# Patient Record
Sex: MALE | Race: BLACK OR AFRICAN AMERICAN | Employment: OTHER | URBAN - NONMETROPOLITAN AREA
[De-identification: names, ages, dates, MRNs, and addresses within clinical notes are randomized per-mention and may not be internally consistent; named-entity substitution may affect disease eponyms.]

---

## 2017-01-04 ENCOUNTER — CLINICAL DOCUMENTATION (OUTPATIENT)
Dept: FAMILY MEDICINE CLINIC | Age: 82
End: 2017-01-04

## 2017-01-04 VITALS
BODY MASS INDEX: 52.09 KG/M2 | WEIGHT: 315 LBS | TEMPERATURE: 98.1 F | SYSTOLIC BLOOD PRESSURE: 116 MMHG | HEART RATE: 78 BPM | RESPIRATION RATE: 18 BRPM | DIASTOLIC BLOOD PRESSURE: 67 MMHG

## 2017-01-04 DIAGNOSIS — R31.0 GROSS HEMATURIA: ICD-10-CM

## 2017-01-04 DIAGNOSIS — Z99.2 TYPE 2 DIABETES MELLITUS WITH CHRONIC KIDNEY DISEASE ON CHRONIC DIALYSIS, WITH LONG-TERM CURRENT USE OF INSULIN (HCC): ICD-10-CM

## 2017-01-04 DIAGNOSIS — Z79.4 TYPE 2 DIABETES MELLITUS WITH CHRONIC KIDNEY DISEASE ON CHRONIC DIALYSIS, WITH LONG-TERM CURRENT USE OF INSULIN (HCC): ICD-10-CM

## 2017-01-04 DIAGNOSIS — I50.42 CHRONIC COMBINED SYSTOLIC AND DIASTOLIC CONGESTIVE HEART FAILURE (HCC): ICD-10-CM

## 2017-01-04 DIAGNOSIS — Z95.3 S/P AORTIC VALVE REPLACEMENT WITH TISSUE: ICD-10-CM

## 2017-01-04 DIAGNOSIS — I25.10 CORONARY ARTERY DISEASE INVOLVING NATIVE CORONARY ARTERY OF NATIVE HEART WITHOUT ANGINA PECTORIS: ICD-10-CM

## 2017-01-04 DIAGNOSIS — I26.99 OTHER ACUTE PULMONARY EMBOLISM WITHOUT ACUTE COR PULMONALE (HCC): Primary | ICD-10-CM

## 2017-01-04 DIAGNOSIS — Z95.0 PACEMAKER: ICD-10-CM

## 2017-01-04 DIAGNOSIS — N40.1 BENIGN PROSTATIC HYPERPLASIA WITH LOWER URINARY TRACT SYMPTOMS, UNSPECIFIED MORPHOLOGY: ICD-10-CM

## 2017-01-04 DIAGNOSIS — E11.22 TYPE 2 DIABETES MELLITUS WITH CHRONIC KIDNEY DISEASE ON CHRONIC DIALYSIS, WITH LONG-TERM CURRENT USE OF INSULIN (HCC): ICD-10-CM

## 2017-01-04 DIAGNOSIS — R53.81 PHYSICAL DECONDITIONING: ICD-10-CM

## 2017-01-04 DIAGNOSIS — N18.6 TYPE 2 DIABETES MELLITUS WITH CHRONIC KIDNEY DISEASE ON CHRONIC DIALYSIS, WITH LONG-TERM CURRENT USE OF INSULIN (HCC): ICD-10-CM

## 2017-01-04 DIAGNOSIS — N28.89 RENAL MASS, LEFT: ICD-10-CM

## 2017-01-04 DIAGNOSIS — J42 CHRONIC BRONCHITIS, UNSPECIFIED CHRONIC BRONCHITIS TYPE (HCC): ICD-10-CM

## 2017-01-04 DIAGNOSIS — E78.00 PURE HYPERCHOLESTEROLEMIA: ICD-10-CM

## 2017-01-04 DIAGNOSIS — M17.12 PRIMARY OSTEOARTHRITIS OF LEFT KNEE: ICD-10-CM

## 2017-01-25 ENCOUNTER — PROCEDURE VISIT (OUTPATIENT)
Dept: CARDIOLOGY | Age: 82
End: 2017-01-25

## 2017-01-25 ENCOUNTER — OFFICE VISIT (OUTPATIENT)
Dept: CARDIOLOGY | Age: 82
End: 2017-01-25

## 2017-01-25 VITALS
DIASTOLIC BLOOD PRESSURE: 54 MMHG | WEIGHT: 315 LBS | HEART RATE: 80 BPM | SYSTOLIC BLOOD PRESSURE: 72 MMHG | HEIGHT: 70 IN | BODY MASS INDEX: 45.1 KG/M2

## 2017-01-25 DIAGNOSIS — I48.92 ATRIAL FLUTTER, UNSPECIFIED TYPE (HCC): ICD-10-CM

## 2017-01-25 DIAGNOSIS — Z95.0 PACEMAKER: Primary | ICD-10-CM

## 2017-01-25 DIAGNOSIS — E78.5 HYPERLIPIDEMIA, UNSPECIFIED HYPERLIPIDEMIA TYPE: ICD-10-CM

## 2017-01-25 DIAGNOSIS — I48.0 PAROXYSMAL ATRIAL FIBRILLATION (HCC): ICD-10-CM

## 2017-01-25 DIAGNOSIS — I10 ESSENTIAL HYPERTENSION: ICD-10-CM

## 2017-01-25 PROBLEM — I48.91 ATRIAL FIBRILLATION (HCC): Status: ACTIVE | Noted: 2017-01-25

## 2017-01-25 PROCEDURE — 1036F TOBACCO NON-USER: CPT | Performed by: INTERNAL MEDICINE

## 2017-01-25 PROCEDURE — 4040F PNEUMOC VAC/ADMIN/RCVD: CPT | Performed by: INTERNAL MEDICINE

## 2017-01-25 PROCEDURE — G8419 CALC BMI OUT NRM PARAM NOF/U: HCPCS | Performed by: INTERNAL MEDICINE

## 2017-01-25 PROCEDURE — 1111F DSCHRG MED/CURRENT MED MERGE: CPT | Performed by: INTERNAL MEDICINE

## 2017-01-25 PROCEDURE — G8484 FLU IMMUNIZE NO ADMIN: HCPCS | Performed by: INTERNAL MEDICINE

## 2017-01-25 PROCEDURE — 93280 PM DEVICE PROGR EVAL DUAL: CPT | Performed by: INTERNAL MEDICINE

## 2017-01-25 PROCEDURE — G8598 ASA/ANTIPLAT THER USED: HCPCS | Performed by: INTERNAL MEDICINE

## 2017-01-25 PROCEDURE — G8427 DOCREV CUR MEDS BY ELIG CLIN: HCPCS | Performed by: INTERNAL MEDICINE

## 2017-01-25 PROCEDURE — 1123F ACP DISCUSS/DSCN MKR DOCD: CPT | Performed by: INTERNAL MEDICINE

## 2017-01-25 PROCEDURE — 99213 OFFICE O/P EST LOW 20 MIN: CPT | Performed by: INTERNAL MEDICINE

## 2017-01-25 ASSESSMENT — ENCOUNTER SYMPTOMS
GASTROINTESTINAL NEGATIVE: 1
RESPIRATORY NEGATIVE: 1
EYES NEGATIVE: 1

## 2017-02-10 LAB — INR BLD: 2.8

## 2017-02-17 LAB — INR BLD: 1.7

## 2017-02-20 LAB — INR BLD: 3

## 2017-02-24 LAB — INR BLD: 2

## 2017-02-28 LAB — INR BLD: 2.9

## 2017-03-07 LAB — INR BLD: 2.8

## 2017-03-10 LAB — INR BLD: 2.1

## 2017-03-13 ENCOUNTER — TELEPHONE (OUTPATIENT)
Dept: OTHER | Age: 82
End: 2017-03-13

## 2017-03-16 ENCOUNTER — ANTI-COAG VISIT (OUTPATIENT)
Dept: OTHER | Age: 82
End: 2017-03-16

## 2017-03-16 VITALS — DIASTOLIC BLOOD PRESSURE: 56 MMHG | TEMPERATURE: 97 F | HEART RATE: 79 BPM | SYSTOLIC BLOOD PRESSURE: 106 MMHG

## 2017-03-16 DIAGNOSIS — I26.99 OTHER ACUTE PULMONARY EMBOLISM WITHOUT ACUTE COR PULMONALE (HCC): ICD-10-CM

## 2017-03-16 DIAGNOSIS — Z95.2 STATUS POST AORTIC VALVE REPLACEMENT: ICD-10-CM

## 2017-03-16 DIAGNOSIS — I48.92 ATRIAL FLUTTER, UNSPECIFIED TYPE (HCC): ICD-10-CM

## 2017-03-16 DIAGNOSIS — I48.0 PAROXYSMAL ATRIAL FIBRILLATION (HCC): ICD-10-CM

## 2017-03-16 PROBLEM — Z79.01 ANTICOAGULATED ON COUMADIN: Status: ACTIVE | Noted: 2017-03-16

## 2017-03-16 LAB — POC INR: 1.2 (ref 0.8–1.2)

## 2017-03-16 RX ORDER — WARFARIN SODIUM 2.5 MG/1
TABLET ORAL
Status: ON HOLD | COMMUNITY
End: 2020-01-01

## 2017-03-16 RX ORDER — WARFARIN SODIUM 3 MG/1
TABLET ORAL
COMMUNITY
End: 2017-03-27 | Stop reason: SDUPTHER

## 2017-03-16 RX ORDER — WARFARIN SODIUM 1 MG/1
TABLET ORAL
COMMUNITY
End: 2017-03-27 | Stop reason: SDUPTHER

## 2017-03-22 ENCOUNTER — ANTI-COAG VISIT (OUTPATIENT)
Dept: OTHER | Age: 82
End: 2017-03-22

## 2017-03-22 VITALS
BODY MASS INDEX: 45.03 KG/M2 | HEART RATE: 80 BPM | SYSTOLIC BLOOD PRESSURE: 112 MMHG | DIASTOLIC BLOOD PRESSURE: 60 MMHG | WEIGHT: 313.8 LBS

## 2017-03-22 DIAGNOSIS — I26.99 OTHER ACUTE PULMONARY EMBOLISM WITHOUT ACUTE COR PULMONALE (HCC): ICD-10-CM

## 2017-03-22 DIAGNOSIS — I48.92 ATRIAL FLUTTER, UNSPECIFIED TYPE (HCC): ICD-10-CM

## 2017-03-22 DIAGNOSIS — I48.0 PAROXYSMAL ATRIAL FIBRILLATION (HCC): ICD-10-CM

## 2017-03-22 DIAGNOSIS — Z95.2 STATUS POST AORTIC VALVE REPLACEMENT: ICD-10-CM

## 2017-03-22 LAB — POC INR: 1.2 (ref 0.8–1.2)

## 2017-03-22 RX ORDER — INSULIN GLARGINE 100 [IU]/ML
20 INJECTION, SOLUTION SUBCUTANEOUS NIGHTLY
COMMUNITY
End: 2017-03-27

## 2017-03-22 RX ORDER — TAMSULOSIN HYDROCHLORIDE 0.4 MG/1
0.4 CAPSULE ORAL DAILY
COMMUNITY
End: 2017-06-20 | Stop reason: SDUPTHER

## 2017-03-22 ASSESSMENT — ENCOUNTER SYMPTOMS
CONSTIPATION: 0
BLOOD IN STOOL: 0
DIARRHEA: 0
SHORTNESS OF BREATH: 1

## 2017-03-27 ENCOUNTER — ANTI-COAG VISIT (OUTPATIENT)
Dept: OTHER | Age: 82
End: 2017-03-27

## 2017-03-27 VITALS
HEART RATE: 78 BPM | SYSTOLIC BLOOD PRESSURE: 99 MMHG | DIASTOLIC BLOOD PRESSURE: 64 MMHG | WEIGHT: 308 LBS | BODY MASS INDEX: 44.19 KG/M2

## 2017-03-27 DIAGNOSIS — I48.92 ATRIAL FLUTTER, UNSPECIFIED TYPE (HCC): ICD-10-CM

## 2017-03-27 DIAGNOSIS — I48.0 PAROXYSMAL ATRIAL FIBRILLATION (HCC): ICD-10-CM

## 2017-03-27 DIAGNOSIS — Z95.2 STATUS POST AORTIC VALVE REPLACEMENT: ICD-10-CM

## 2017-03-27 DIAGNOSIS — I26.99 OTHER PULMONARY EMBOLISM WITHOUT ACUTE COR PULMONALE, UNSPECIFIED CHRONICITY (HCC): ICD-10-CM

## 2017-03-27 LAB — POC INR: 1.9 (ref 0.8–1.2)

## 2017-03-27 RX ORDER — INSULIN GLARGINE 100 [IU]/ML
INJECTION, SOLUTION SUBCUTANEOUS
COMMUNITY
Start: 2017-03-15 | End: 2017-06-05

## 2017-03-27 RX ORDER — CEPHALEXIN 250 MG/1
250 CAPSULE ORAL 2 TIMES DAILY
COMMUNITY
Start: 2017-03-23 | End: 2017-04-03

## 2017-03-27 RX ORDER — WARFARIN SODIUM 3 MG/1
TABLET ORAL
Qty: 30 TABLET | Refills: 11 | Status: SHIPPED | OUTPATIENT
Start: 2017-03-27 | End: 2017-04-17 | Stop reason: SDUPTHER

## 2017-03-27 RX ORDER — WARFARIN SODIUM 1 MG/1
TABLET ORAL
Qty: 30 TABLET | Refills: 11 | Status: SHIPPED | OUTPATIENT
Start: 2017-03-27 | End: 2017-08-14

## 2017-03-27 RX ORDER — FAMOTIDINE 20 MG/1
20 TABLET, FILM COATED ORAL DAILY
COMMUNITY
Start: 2017-02-25 | End: 2017-04-24 | Stop reason: ALTCHOICE

## 2017-03-27 RX ORDER — INSULIN ASPART 100 [IU]/ML
INJECTION, SOLUTION INTRAVENOUS; SUBCUTANEOUS
COMMUNITY
Start: 2017-03-15 | End: 2017-06-20 | Stop reason: SDUPTHER

## 2017-03-27 ASSESSMENT — ENCOUNTER SYMPTOMS
BLOOD IN STOOL: 0
CONSTIPATION: 0
SHORTNESS OF BREATH: 1
DIARRHEA: 1
VOMITING: 1

## 2017-03-28 ENCOUNTER — OFFICE VISIT (OUTPATIENT)
Dept: FAMILY MEDICINE CLINIC | Age: 82
End: 2017-03-28

## 2017-03-28 VITALS
DIASTOLIC BLOOD PRESSURE: 64 MMHG | HEIGHT: 70 IN | HEART RATE: 80 BPM | SYSTOLIC BLOOD PRESSURE: 116 MMHG | RESPIRATION RATE: 16 BRPM

## 2017-03-28 DIAGNOSIS — Z99.81 ON HOME OXYGEN THERAPY: ICD-10-CM

## 2017-03-28 DIAGNOSIS — N18.6 ESRD NEEDING DIALYSIS (HCC): ICD-10-CM

## 2017-03-28 DIAGNOSIS — N18.4 TYPE 2 DIABETES MELLITUS WITH STAGE 4 CHRONIC KIDNEY DISEASE, WITH LONG-TERM CURRENT USE OF INSULIN (HCC): ICD-10-CM

## 2017-03-28 DIAGNOSIS — I10 ESSENTIAL HYPERTENSION: ICD-10-CM

## 2017-03-28 DIAGNOSIS — Z79.01 ANTICOAGULATED ON COUMADIN: ICD-10-CM

## 2017-03-28 DIAGNOSIS — I25.10 CORONARY ARTERY DISEASE INVOLVING NATIVE CORONARY ARTERY OF NATIVE HEART WITHOUT ANGINA PECTORIS: ICD-10-CM

## 2017-03-28 DIAGNOSIS — Z79.4 TYPE 2 DIABETES MELLITUS WITH STAGE 4 CHRONIC KIDNEY DISEASE, WITH LONG-TERM CURRENT USE OF INSULIN (HCC): ICD-10-CM

## 2017-03-28 DIAGNOSIS — Z99.2 ESRD NEEDING DIALYSIS (HCC): ICD-10-CM

## 2017-03-28 DIAGNOSIS — Z99.2 HEMODIALYSIS PATIENT (HCC): ICD-10-CM

## 2017-03-28 DIAGNOSIS — E66.01 MORBID OBESITY WITH BMI OF 50.0-59.9, ADULT (HCC): ICD-10-CM

## 2017-03-28 DIAGNOSIS — E78.5 HYPERLIPIDEMIA, UNSPECIFIED HYPERLIPIDEMIA TYPE: ICD-10-CM

## 2017-03-28 DIAGNOSIS — I51.9 HEART DISEASE: ICD-10-CM

## 2017-03-28 DIAGNOSIS — I50.43 ACUTE ON CHRONIC COMBINED SYSTOLIC AND DIASTOLIC CONGESTIVE HEART FAILURE (HCC): Primary | ICD-10-CM

## 2017-03-28 DIAGNOSIS — E11.22 TYPE 2 DIABETES MELLITUS WITH STAGE 4 CHRONIC KIDNEY DISEASE, WITH LONG-TERM CURRENT USE OF INSULIN (HCC): ICD-10-CM

## 2017-03-28 LAB
GLUCOSE BLD-MCNC: 130 MG/DL
HBA1C MFR BLD: 6.7 %

## 2017-03-28 PROCEDURE — 82962 GLUCOSE BLOOD TEST: CPT | Performed by: EMERGENCY MEDICINE

## 2017-03-28 PROCEDURE — 83036 HEMOGLOBIN GLYCOSYLATED A1C: CPT | Performed by: EMERGENCY MEDICINE

## 2017-03-28 PROCEDURE — 99214 OFFICE O/P EST MOD 30 MIN: CPT | Performed by: EMERGENCY MEDICINE

## 2017-03-28 RX ORDER — NYSTATIN 100000 [USP'U]/G
POWDER TOPICAL
Qty: 60 G | Refills: 1 | Status: SHIPPED | OUTPATIENT
Start: 2017-03-28 | End: 2017-04-24 | Stop reason: ALTCHOICE

## 2017-03-28 ASSESSMENT — ENCOUNTER SYMPTOMS
SINUS PRESSURE: 0
CONSTIPATION: 0
WHEEZING: 0
VOMITING: 0
CHEST TIGHTNESS: 0
DIARRHEA: 0
VOICE CHANGE: 0
COUGH: 0
SORE THROAT: 0
ABDOMINAL PAIN: 0
NAUSEA: 0
TROUBLE SWALLOWING: 0
BACK PAIN: 0
RHINORRHEA: 0

## 2017-03-29 ASSESSMENT — ENCOUNTER SYMPTOMS: SHORTNESS OF BREATH: 1

## 2017-04-03 ENCOUNTER — TELEPHONE (OUTPATIENT)
Dept: FAMILY MEDICINE CLINIC | Age: 82
End: 2017-04-03

## 2017-04-03 ENCOUNTER — ANTI-COAG VISIT (OUTPATIENT)
Dept: OTHER | Age: 82
End: 2017-04-03

## 2017-04-03 VITALS
HEART RATE: 79 BPM | WEIGHT: 315 LBS | SYSTOLIC BLOOD PRESSURE: 109 MMHG | BODY MASS INDEX: 47.78 KG/M2 | DIASTOLIC BLOOD PRESSURE: 50 MMHG

## 2017-04-03 DIAGNOSIS — I48.92 ATRIAL FLUTTER, UNSPECIFIED TYPE (HCC): ICD-10-CM

## 2017-04-03 DIAGNOSIS — I48.0 PAROXYSMAL ATRIAL FIBRILLATION (HCC): ICD-10-CM

## 2017-04-03 DIAGNOSIS — I26.99 OTHER PULMONARY EMBOLISM WITHOUT ACUTE COR PULMONALE, UNSPECIFIED CHRONICITY (HCC): ICD-10-CM

## 2017-04-03 LAB — POC INR: 1.8 (ref 0.8–1.2)

## 2017-04-03 ASSESSMENT — ENCOUNTER SYMPTOMS
BLOOD IN STOOL: 0
DIARRHEA: 0
SHORTNESS OF BREATH: 1
CONSTIPATION: 0

## 2017-04-05 ENCOUNTER — TELEPHONE (OUTPATIENT)
Dept: OTHER | Age: 82
End: 2017-04-05

## 2017-04-10 ENCOUNTER — ANTI-COAG VISIT (OUTPATIENT)
Dept: OTHER | Age: 82
End: 2017-04-10

## 2017-04-10 VITALS — SYSTOLIC BLOOD PRESSURE: 105 MMHG | DIASTOLIC BLOOD PRESSURE: 53 MMHG | TEMPERATURE: 97 F | HEART RATE: 78 BPM

## 2017-04-10 DIAGNOSIS — I48.0 PAROXYSMAL ATRIAL FIBRILLATION (HCC): ICD-10-CM

## 2017-04-10 DIAGNOSIS — I48.92 ATRIAL FLUTTER, UNSPECIFIED TYPE (HCC): ICD-10-CM

## 2017-04-10 DIAGNOSIS — I26.99 OTHER PULMONARY EMBOLISM WITHOUT ACUTE COR PULMONALE, UNSPECIFIED CHRONICITY (HCC): ICD-10-CM

## 2017-04-10 LAB — POC INR: 1.4 (ref 0.8–1.2)

## 2017-04-10 ASSESSMENT — ENCOUNTER SYMPTOMS
CONSTIPATION: 0
SHORTNESS OF BREATH: 1
DIARRHEA: 0
BLOOD IN STOOL: 0

## 2017-04-17 ENCOUNTER — ANTI-COAG VISIT (OUTPATIENT)
Dept: OTHER | Age: 82
End: 2017-04-17

## 2017-04-17 VITALS — TEMPERATURE: 97 F | DIASTOLIC BLOOD PRESSURE: 51 MMHG | HEART RATE: 79 BPM | SYSTOLIC BLOOD PRESSURE: 103 MMHG

## 2017-04-17 DIAGNOSIS — I26.99 OTHER PULMONARY EMBOLISM WITHOUT ACUTE COR PULMONALE, UNSPECIFIED CHRONICITY (HCC): ICD-10-CM

## 2017-04-17 DIAGNOSIS — I48.92 ATRIAL FLUTTER, UNSPECIFIED TYPE (HCC): ICD-10-CM

## 2017-04-17 DIAGNOSIS — I48.0 PAROXYSMAL ATRIAL FIBRILLATION (HCC): ICD-10-CM

## 2017-04-17 LAB — POC INR: 1.4 (ref 0.8–1.2)

## 2017-04-17 RX ORDER — WARFARIN SODIUM 3 MG/1
TABLET ORAL
Qty: 180 TABLET | Refills: 3 | Status: SHIPPED | OUTPATIENT
Start: 2017-04-17 | End: 2018-01-04 | Stop reason: SDUPTHER

## 2017-04-17 ASSESSMENT — ENCOUNTER SYMPTOMS
DIARRHEA: 0
CONSTIPATION: 0
BLOOD IN STOOL: 0
SHORTNESS OF BREATH: 1

## 2017-04-18 DIAGNOSIS — I26.99 OTHER PULMONARY EMBOLISM WITHOUT ACUTE COR PULMONALE, UNSPECIFIED CHRONICITY (HCC): Primary | ICD-10-CM

## 2017-04-18 DIAGNOSIS — I48.92 ATRIAL FLUTTER, UNSPECIFIED TYPE (HCC): ICD-10-CM

## 2017-04-24 ENCOUNTER — ANTI-COAG VISIT (OUTPATIENT)
Dept: OTHER | Age: 82
End: 2017-04-24

## 2017-04-24 VITALS — TEMPERATURE: 96.8 F | SYSTOLIC BLOOD PRESSURE: 82 MMHG | DIASTOLIC BLOOD PRESSURE: 47 MMHG | HEART RATE: 80 BPM

## 2017-04-24 DIAGNOSIS — I48.92 ATRIAL FLUTTER, UNSPECIFIED TYPE (HCC): ICD-10-CM

## 2017-04-24 DIAGNOSIS — I26.99 OTHER PULMONARY EMBOLISM WITHOUT ACUTE COR PULMONALE, UNSPECIFIED CHRONICITY (HCC): ICD-10-CM

## 2017-04-24 DIAGNOSIS — I48.0 PAROXYSMAL ATRIAL FIBRILLATION (HCC): ICD-10-CM

## 2017-04-24 LAB — POC INR: 1.5 (ref 0.8–1.2)

## 2017-04-24 ASSESSMENT — ENCOUNTER SYMPTOMS
SHORTNESS OF BREATH: 0
DIARRHEA: 0
BLOOD IN STOOL: 0
CONSTIPATION: 0

## 2017-05-01 ENCOUNTER — ANTI-COAG VISIT (OUTPATIENT)
Dept: OTHER | Age: 82
End: 2017-05-01

## 2017-05-01 VITALS
WEIGHT: 313 LBS | DIASTOLIC BLOOD PRESSURE: 58 MMHG | BODY MASS INDEX: 44.91 KG/M2 | SYSTOLIC BLOOD PRESSURE: 105 MMHG | HEART RATE: 79 BPM

## 2017-05-01 DIAGNOSIS — I48.92 ATRIAL FLUTTER, UNSPECIFIED TYPE (HCC): ICD-10-CM

## 2017-05-01 DIAGNOSIS — I26.99 OTHER PULMONARY EMBOLISM WITHOUT ACUTE COR PULMONALE, UNSPECIFIED CHRONICITY (HCC): ICD-10-CM

## 2017-05-01 DIAGNOSIS — I48.0 PAROXYSMAL ATRIAL FIBRILLATION (HCC): ICD-10-CM

## 2017-05-01 LAB — POC INR: 2.5 (ref 0.8–1.2)

## 2017-05-01 ASSESSMENT — ENCOUNTER SYMPTOMS
DIARRHEA: 0
BLOOD IN STOOL: 0
CONSTIPATION: 0
SHORTNESS OF BREATH: 1

## 2017-05-15 ENCOUNTER — ANTI-COAG VISIT (OUTPATIENT)
Dept: OTHER | Age: 82
End: 2017-05-15

## 2017-05-15 VITALS
DIASTOLIC BLOOD PRESSURE: 58 MMHG | WEIGHT: 312 LBS | HEART RATE: 80 BPM | SYSTOLIC BLOOD PRESSURE: 93 MMHG | BODY MASS INDEX: 44.77 KG/M2

## 2017-05-15 DIAGNOSIS — I48.0 PAROXYSMAL ATRIAL FIBRILLATION (HCC): ICD-10-CM

## 2017-05-15 DIAGNOSIS — I26.99 OTHER PULMONARY EMBOLISM WITHOUT ACUTE COR PULMONALE, UNSPECIFIED CHRONICITY (HCC): ICD-10-CM

## 2017-05-15 DIAGNOSIS — I48.92 ATRIAL FLUTTER, UNSPECIFIED TYPE (HCC): ICD-10-CM

## 2017-05-15 LAB — POC INR: 2.2 (ref 0.8–1.2)

## 2017-05-15 RX ORDER — B COMPLEX, C NO.20/FOLIC ACID 1 MG
1 CAPSULE ORAL DAILY
Refills: 0 | COMMUNITY
Start: 2017-04-18 | End: 2018-01-04 | Stop reason: SDUPTHER

## 2017-05-15 ASSESSMENT — ENCOUNTER SYMPTOMS
BLOOD IN STOOL: 0
SHORTNESS OF BREATH: 1
CONSTIPATION: 0
DIARRHEA: 0

## 2017-06-05 ENCOUNTER — ANTI-COAG VISIT (OUTPATIENT)
Dept: OTHER | Age: 82
End: 2017-06-05

## 2017-06-05 VITALS — DIASTOLIC BLOOD PRESSURE: 48 MMHG | TEMPERATURE: 97.5 F | HEART RATE: 79 BPM | SYSTOLIC BLOOD PRESSURE: 90 MMHG

## 2017-06-05 DIAGNOSIS — I48.92 ATRIAL FLUTTER, UNSPECIFIED TYPE (HCC): ICD-10-CM

## 2017-06-05 DIAGNOSIS — I48.0 PAROXYSMAL ATRIAL FIBRILLATION (HCC): ICD-10-CM

## 2017-06-05 DIAGNOSIS — I26.99 OTHER PULMONARY EMBOLISM WITHOUT ACUTE COR PULMONALE, UNSPECIFIED CHRONICITY (HCC): ICD-10-CM

## 2017-06-05 LAB — POC INR: 1.6 (ref 0.8–1.2)

## 2017-06-05 ASSESSMENT — ENCOUNTER SYMPTOMS
BLOOD IN STOOL: 0
CONSTIPATION: 0
DIARRHEA: 0
SHORTNESS OF BREATH: 1

## 2017-06-06 RX ORDER — SEVELAMER CARBONATE 800 MG/1
1 TABLET, FILM COATED ORAL
Qty: 30 TABLET | Refills: 0 | COMMUNITY
Start: 2017-06-06 | End: 2017-06-20 | Stop reason: SDUPTHER

## 2017-06-20 ENCOUNTER — OFFICE VISIT (OUTPATIENT)
Dept: FAMILY MEDICINE CLINIC | Age: 82
End: 2017-06-20

## 2017-06-20 VITALS
SYSTOLIC BLOOD PRESSURE: 98 MMHG | HEART RATE: 84 BPM | BODY MASS INDEX: 45.1 KG/M2 | HEIGHT: 70 IN | DIASTOLIC BLOOD PRESSURE: 62 MMHG | WEIGHT: 315 LBS | RESPIRATION RATE: 16 BRPM

## 2017-06-20 DIAGNOSIS — Z79.4 TYPE 2 DIABETES MELLITUS WITH STAGE 4 CHRONIC KIDNEY DISEASE, WITH LONG-TERM CURRENT USE OF INSULIN (HCC): Primary | ICD-10-CM

## 2017-06-20 DIAGNOSIS — E11.22 TYPE 2 DIABETES MELLITUS WITH STAGE 4 CHRONIC KIDNEY DISEASE, WITH LONG-TERM CURRENT USE OF INSULIN (HCC): Primary | ICD-10-CM

## 2017-06-20 DIAGNOSIS — I50.42 CHRONIC COMBINED SYSTOLIC AND DIASTOLIC HEART FAILURE (HCC): ICD-10-CM

## 2017-06-20 DIAGNOSIS — E66.01 MORBID OBESITY WITH BODY MASS INDEX OF 40.0-49.9 (HCC): ICD-10-CM

## 2017-06-20 DIAGNOSIS — Z78.9 IMPAIRED MOBILITY AND ADLS: ICD-10-CM

## 2017-06-20 DIAGNOSIS — Z74.09 IMPAIRED MOBILITY AND ADLS: ICD-10-CM

## 2017-06-20 DIAGNOSIS — I10 ESSENTIAL HYPERTENSION: ICD-10-CM

## 2017-06-20 DIAGNOSIS — E78.5 HYPERLIPIDEMIA, UNSPECIFIED HYPERLIPIDEMIA TYPE: ICD-10-CM

## 2017-06-20 DIAGNOSIS — N18.4 TYPE 2 DIABETES MELLITUS WITH STAGE 4 CHRONIC KIDNEY DISEASE, WITH LONG-TERM CURRENT USE OF INSULIN (HCC): Primary | ICD-10-CM

## 2017-06-20 DIAGNOSIS — Z99.81 ON HOME OXYGEN THERAPY: ICD-10-CM

## 2017-06-20 LAB
GLUCOSE BLD-MCNC: 126 MG/DL
HBA1C MFR BLD: 6.4 %

## 2017-06-20 PROCEDURE — 99213 OFFICE O/P EST LOW 20 MIN: CPT | Performed by: EMERGENCY MEDICINE

## 2017-06-20 PROCEDURE — 83036 HEMOGLOBIN GLYCOSYLATED A1C: CPT | Performed by: EMERGENCY MEDICINE

## 2017-06-20 PROCEDURE — 82962 GLUCOSE BLOOD TEST: CPT | Performed by: EMERGENCY MEDICINE

## 2017-06-20 RX ORDER — BLOOD SUGAR DIAGNOSTIC
STRIP MISCELLANEOUS
Refills: 0 | COMMUNITY
Start: 2017-06-16 | End: 2017-06-20 | Stop reason: SDUPTHER

## 2017-06-20 RX ORDER — SEVELAMER CARBONATE 800 MG/1
1 TABLET, FILM COATED ORAL
Qty: 30 TABLET | Refills: 0 | Status: SHIPPED | OUTPATIENT
Start: 2017-06-20 | End: 2018-01-04

## 2017-06-20 RX ORDER — TAMSULOSIN HYDROCHLORIDE 0.4 MG/1
0.4 CAPSULE ORAL DAILY
Qty: 30 CAPSULE | Refills: 5 | Status: SHIPPED | OUTPATIENT
Start: 2017-06-20 | End: 2018-01-04 | Stop reason: SDUPTHER

## 2017-06-20 RX ORDER — BLOOD SUGAR DIAGNOSTIC
1 STRIP MISCELLANEOUS 3 TIMES DAILY
Qty: 300 EACH | Refills: 0 | Status: SHIPPED | OUTPATIENT
Start: 2017-06-20 | End: 2018-02-15 | Stop reason: SDUPTHER

## 2017-06-20 RX ORDER — INSULIN ASPART 100 [IU]/ML
INJECTION, SOLUTION INTRAVENOUS; SUBCUTANEOUS
Qty: 5 PEN | Refills: 5 | Status: SHIPPED | OUTPATIENT
Start: 2017-06-20 | End: 2018-01-04 | Stop reason: SDUPTHER

## 2017-06-20 ASSESSMENT — ENCOUNTER SYMPTOMS
VOICE CHANGE: 0
SINUS PRESSURE: 0
SHORTNESS OF BREATH: 1
WHEEZING: 0
TROUBLE SWALLOWING: 0
BACK PAIN: 0
VOMITING: 0
COUGH: 0
VISUAL CHANGE: 0
RHINORRHEA: 0
ABDOMINAL PAIN: 0
SORE THROAT: 0
CONSTIPATION: 0
NAUSEA: 0
CHEST TIGHTNESS: 0
DIARRHEA: 0

## 2017-07-03 ENCOUNTER — ANTI-COAG VISIT (OUTPATIENT)
Dept: OTHER | Age: 82
End: 2017-07-03

## 2017-07-03 VITALS
WEIGHT: 315 LBS | SYSTOLIC BLOOD PRESSURE: 105 MMHG | HEART RATE: 79 BPM | BODY MASS INDEX: 46.03 KG/M2 | DIASTOLIC BLOOD PRESSURE: 58 MMHG

## 2017-07-03 DIAGNOSIS — I48.0 PAROXYSMAL ATRIAL FIBRILLATION (HCC): ICD-10-CM

## 2017-07-03 DIAGNOSIS — I48.92 ATRIAL FLUTTER, UNSPECIFIED TYPE (HCC): ICD-10-CM

## 2017-07-03 DIAGNOSIS — I26.99 OTHER PULMONARY EMBOLISM WITHOUT ACUTE COR PULMONALE, UNSPECIFIED CHRONICITY (HCC): ICD-10-CM

## 2017-07-03 LAB — POC INR: 1.8 (ref 0.8–1.2)

## 2017-07-03 ASSESSMENT — ENCOUNTER SYMPTOMS
CONSTIPATION: 0
DIARRHEA: 0
BLOOD IN STOOL: 0
SHORTNESS OF BREATH: 1

## 2017-07-17 ENCOUNTER — HOSPITAL ENCOUNTER (OUTPATIENT)
Dept: PHARMACY | Age: 82
Setting detail: THERAPIES SERIES
Discharge: HOME | End: 2017-07-17
Payer: MEDICARE

## 2017-07-17 VITALS — HEART RATE: 80 BPM | SYSTOLIC BLOOD PRESSURE: 93 MMHG | DIASTOLIC BLOOD PRESSURE: 53 MMHG | TEMPERATURE: 96.7 F

## 2017-07-17 DIAGNOSIS — I48.92 ATRIAL FLUTTER, UNSPECIFIED TYPE (HCC): ICD-10-CM

## 2017-07-17 DIAGNOSIS — I48.0 PAROXYSMAL ATRIAL FIBRILLATION (HCC): ICD-10-CM

## 2017-07-17 DIAGNOSIS — I26.99 OTHER PULMONARY EMBOLISM WITHOUT ACUTE COR PULMONALE, UNSPECIFIED CHRONICITY (HCC): ICD-10-CM

## 2017-07-17 LAB
INTERNATIONAL NORMALIZATION RATIO, POC: 1.9
POC INR: 1.9 (ref 0.8–1.2)

## 2017-07-17 PROCEDURE — 99211 OFF/OP EST MAY X REQ PHY/QHP: CPT

## 2017-07-17 PROCEDURE — 36416 COLLJ CAPILLARY BLOOD SPEC: CPT

## 2017-07-17 PROCEDURE — 85610 PROTHROMBIN TIME: CPT

## 2017-07-20 RX ORDER — FLUTICASONE PROPIONATE 50 MCG
SPRAY, SUSPENSION (ML) NASAL
Qty: 48 G | Refills: 2 | Status: SHIPPED | OUTPATIENT
Start: 2017-07-20 | End: 2018-01-04 | Stop reason: SDUPTHER

## 2017-07-31 ENCOUNTER — HOSPITAL ENCOUNTER (OUTPATIENT)
Dept: PHARMACY | Age: 82
Setting detail: THERAPIES SERIES
Discharge: HOME | End: 2017-07-31
Payer: MEDICARE

## 2017-07-31 VITALS — TEMPERATURE: 96.5 F | SYSTOLIC BLOOD PRESSURE: 101 MMHG | DIASTOLIC BLOOD PRESSURE: 59 MMHG | HEART RATE: 79 BPM

## 2017-07-31 DIAGNOSIS — I48.0 PAROXYSMAL ATRIAL FIBRILLATION (HCC): ICD-10-CM

## 2017-07-31 DIAGNOSIS — I48.92 ATRIAL FLUTTER, UNSPECIFIED TYPE (HCC): ICD-10-CM

## 2017-07-31 DIAGNOSIS — I26.99 OTHER PULMONARY EMBOLISM WITHOUT ACUTE COR PULMONALE, UNSPECIFIED CHRONICITY (HCC): ICD-10-CM

## 2017-07-31 LAB — POC INR: 3.1 (ref 0.8–1.2)

## 2017-07-31 PROCEDURE — 36416 COLLJ CAPILLARY BLOOD SPEC: CPT | Performed by: PHARMACIST

## 2017-07-31 PROCEDURE — 99211 OFF/OP EST MAY X REQ PHY/QHP: CPT | Performed by: PHARMACIST

## 2017-07-31 PROCEDURE — 85610 PROTHROMBIN TIME: CPT | Performed by: PHARMACIST

## 2017-07-31 RX ORDER — FLUDROCORTISONE ACETATE 0.1 MG/1
0.1 TABLET ORAL DAILY
COMMUNITY
End: 2018-01-04 | Stop reason: SDUPTHER

## 2017-08-14 ENCOUNTER — HOSPITAL ENCOUNTER (OUTPATIENT)
Dept: PHARMACY | Age: 82
Setting detail: THERAPIES SERIES
Discharge: HOME | End: 2017-08-14
Payer: MEDICARE

## 2017-08-14 VITALS — HEART RATE: 79 BPM | TEMPERATURE: 97.8 F | SYSTOLIC BLOOD PRESSURE: 89 MMHG | DIASTOLIC BLOOD PRESSURE: 48 MMHG

## 2017-08-14 DIAGNOSIS — I48.92 ATRIAL FLUTTER, UNSPECIFIED TYPE (HCC): ICD-10-CM

## 2017-08-14 DIAGNOSIS — I26.99 OTHER PULMONARY EMBOLISM WITHOUT ACUTE COR PULMONALE, UNSPECIFIED CHRONICITY (HCC): ICD-10-CM

## 2017-08-14 DIAGNOSIS — I48.0 PAROXYSMAL ATRIAL FIBRILLATION (HCC): ICD-10-CM

## 2017-08-14 LAB — POC INR: 2.3 (ref 0.8–1.2)

## 2017-08-14 PROCEDURE — 85610 PROTHROMBIN TIME: CPT

## 2017-08-14 PROCEDURE — 36416 COLLJ CAPILLARY BLOOD SPEC: CPT

## 2017-08-14 PROCEDURE — 99211 OFF/OP EST MAY X REQ PHY/QHP: CPT

## 2017-08-14 ASSESSMENT — ENCOUNTER SYMPTOMS
CONSTIPATION: 0
SHORTNESS OF BREATH: 1
DIARRHEA: 0
BLOOD IN STOOL: 0

## 2017-08-23 ENCOUNTER — NURSE ONLY (OUTPATIENT)
Dept: CARDIOLOGY CLINIC | Age: 82
End: 2017-08-23
Payer: MEDICARE

## 2017-08-23 DIAGNOSIS — Z95.0 PACEMAKER: Primary | ICD-10-CM

## 2017-08-23 PROCEDURE — 93280 PM DEVICE PROGR EVAL DUAL: CPT | Performed by: INTERNAL MEDICINE

## 2017-09-06 ENCOUNTER — HOSPITAL ENCOUNTER (OUTPATIENT)
Dept: PHARMACY | Age: 82
Setting detail: THERAPIES SERIES
Discharge: HOME | End: 2017-09-06
Payer: MEDICARE

## 2017-09-06 VITALS — HEART RATE: 80 BPM | DIASTOLIC BLOOD PRESSURE: 57 MMHG | TEMPERATURE: 95.8 F | SYSTOLIC BLOOD PRESSURE: 116 MMHG

## 2017-09-06 DIAGNOSIS — I48.0 PAROXYSMAL ATRIAL FIBRILLATION (HCC): ICD-10-CM

## 2017-09-06 DIAGNOSIS — I26.99 OTHER PULMONARY EMBOLISM WITHOUT ACUTE COR PULMONALE, UNSPECIFIED CHRONICITY (HCC): ICD-10-CM

## 2017-09-06 LAB — POC INR: 2.6 (ref 0.8–1.2)

## 2017-09-06 PROCEDURE — 36416 COLLJ CAPILLARY BLOOD SPEC: CPT

## 2017-09-06 PROCEDURE — 85610 PROTHROMBIN TIME: CPT

## 2017-09-06 PROCEDURE — 99211 OFF/OP EST MAY X REQ PHY/QHP: CPT

## 2017-09-06 ASSESSMENT — ENCOUNTER SYMPTOMS
SHORTNESS OF BREATH: 0
DIARRHEA: 0
BLOOD IN STOOL: 0
CONSTIPATION: 0

## 2017-10-03 ENCOUNTER — OFFICE VISIT (OUTPATIENT)
Dept: FAMILY MEDICINE CLINIC | Age: 82
End: 2017-10-03

## 2017-10-03 VITALS
HEIGHT: 70 IN | SYSTOLIC BLOOD PRESSURE: 110 MMHG | RESPIRATION RATE: 18 BRPM | BODY MASS INDEX: 45.1 KG/M2 | HEART RATE: 68 BPM | WEIGHT: 315 LBS | DIASTOLIC BLOOD PRESSURE: 76 MMHG

## 2017-10-03 DIAGNOSIS — E11.22 TYPE 2 DIABETES MELLITUS WITH STAGE 4 CHRONIC KIDNEY DISEASE, WITH LONG-TERM CURRENT USE OF INSULIN (HCC): Primary | ICD-10-CM

## 2017-10-03 DIAGNOSIS — Z23 NEED FOR IMMUNIZATION AGAINST INFLUENZA: ICD-10-CM

## 2017-10-03 DIAGNOSIS — J44.9 CHRONIC OBSTRUCTIVE PULMONARY DISEASE, UNSPECIFIED COPD TYPE (HCC): ICD-10-CM

## 2017-10-03 DIAGNOSIS — N18.4 TYPE 2 DIABETES MELLITUS WITH STAGE 4 CHRONIC KIDNEY DISEASE, WITH LONG-TERM CURRENT USE OF INSULIN (HCC): Primary | ICD-10-CM

## 2017-10-03 DIAGNOSIS — Z79.4 TYPE 2 DIABETES MELLITUS WITH STAGE 4 CHRONIC KIDNEY DISEASE, WITH LONG-TERM CURRENT USE OF INSULIN (HCC): Primary | ICD-10-CM

## 2017-10-03 DIAGNOSIS — Z99.2 HEMODIALYSIS PATIENT (HCC): ICD-10-CM

## 2017-10-03 LAB
GLUCOSE BLD-MCNC: 132 MG/DL
HBA1C MFR BLD: 6.2 %

## 2017-10-03 PROCEDURE — G0008 ADMIN INFLUENZA VIRUS VAC: HCPCS | Performed by: EMERGENCY MEDICINE

## 2017-10-03 PROCEDURE — 99214 OFFICE O/P EST MOD 30 MIN: CPT | Performed by: EMERGENCY MEDICINE

## 2017-10-03 PROCEDURE — 82962 GLUCOSE BLOOD TEST: CPT | Performed by: EMERGENCY MEDICINE

## 2017-10-03 PROCEDURE — 83036 HEMOGLOBIN GLYCOSYLATED A1C: CPT | Performed by: EMERGENCY MEDICINE

## 2017-10-03 RX ORDER — TIMOLOL MALEATE 5 MG/ML
1 SOLUTION/ DROPS OPHTHALMIC 2 TIMES DAILY
Qty: 15 ML | Refills: 1 | Status: SHIPPED | OUTPATIENT
Start: 2017-10-03

## 2017-10-03 ASSESSMENT — ENCOUNTER SYMPTOMS
RHINORRHEA: 0
VOICE CHANGE: 0
BACK PAIN: 0
DIARRHEA: 0
SINUS PRESSURE: 0
CONSTIPATION: 0
SHORTNESS OF BREATH: 1
CHEST TIGHTNESS: 0
TROUBLE SWALLOWING: 0
COUGH: 0
WHEEZING: 0
VISUAL CHANGE: 0
NAUSEA: 0
SORE THROAT: 0
VOMITING: 0
ABDOMINAL PAIN: 0

## 2017-10-03 NOTE — PROGRESS NOTES
Visit Date: 10/3/2017    Cat Allen is a 80 y.o. male who presents today for:  Chief Complaint   Patient presents with    Diabetes         HPI:     Patient is here for follow-up. patient just came from dialysis , he does hemodialysis Monday Wednesday and Friday. Patient otherwise has been feeling well      Diabetes   He presents for his follow-up diabetic visit. He has type 1 diabetes mellitus. His disease course has been stable. Pertinent negatives for hypoglycemia include no dizziness or headaches. Pertinent negatives for diabetes include no chest pain, no fatigue, no foot paresthesias, no visual change, no weakness and no weight loss. Symptoms are stable. Hyperlipidemia   Associated symptoms include shortness of breath. Pertinent negatives include no chest pain or myalgias. Hypertension   Associated symptoms include shortness of breath. Pertinent negatives include no chest pain, headaches, neck pain or palpitations.          Current Medications:  Current Outpatient Prescriptions   Medication Sig Dispense Refill    timolol (TIMOPTIC) 0.5 % ophthalmic solution Place 1 drop into both eyes 2 times daily Indications: Disease of the Eye 15 mL 1    OXYGEN by Nasal route See Admin Instructions Indications: Oxygen Therapy      fludrocortisone (FLORINEF) 0.1 MG tablet Take 0.1 mg by mouth daily Indications: Blood Pressure Drop Upon Standing       fluticasone (FLONASE) 50 MCG/ACT nasal spray instill 2 sprays into each nostril twice a day 48 g 2    sevelamer (RENVELA) 800 MG tablet Take 1 tablet by mouth 3 times daily (with meals) 1 Sample Box, Lot [id number], Exp Feb 2019 30 tablet 0    metoprolol tartrate (LOPRESSOR) 25 MG tablet Take 0.5 tablets by mouth Daily 60 tablet 3    tamsulosin (FLOMAX) 0.4 MG capsule Take 1 capsule by mouth daily 30 capsule 5    insulin glargine (LANTUS SOLOSTAR) 100 UNIT/ML injection pen 50 units at night 15 Pen 0    NOVOLOG FLEXPEN 100 UNIT/ML injection pen Per sliding scale Dx insulin (HCC)  POCT glycosylated hemoglobin (Hb A1C)    POCT Glucose   2. Need for immunization against influenza  INFLUENZA, QUADV, 18 YRS AND OLDER, IM, MDV, 0.5ML (Candice Carrera)   3. Hemodialysis patient ([facility]     4. Chronic obstructive pulmonary disease, unspecified COPD type ([facility]         Plan:      Medications Prescribed:  Orders Placed This Encounter   Medications    timolol (TIMOPTIC) 0.5 % ophthalmic solution     Sig: Place 1 drop into both eyes 2 times daily Indications: Disease of the Eye     Dispense:  15 mL     Refill:  1       Orders Placed:  Orders Placed This Encounter   Procedures    INFLUENZA, QUADV, 18 YRS AND OLDER, IM, MDV, 0.5ML (AFLURIA QUADV)    POCT glycosylated hemoglobin (Hb A1C)    POCT Glucose     Lab Results   Component Value Date    LABA1C 6.2 10/03/2017    LABA1C 6.4 06/20/2017    LABA1C 6.7 03/28/2017     Lab Results   Component Value Date    LDLCALC 49 04/01/2017    CREATININE 7.4 ([facility]) 04/01/2017       Continue to monitor blood sugars 3 times a day. Return in about 3 months (around 1/3/2018) for DM. Discussed use, benefit, and side effects of prescribed medications. All patient questions answered. Pt voiced understanding. Instructed to continue current medications, diet and exercise. Patient agreed with treatment plan.

## 2017-10-03 NOTE — MR AVS SNAPSHOT
5' 10\" (1.778 m) 322 lb 6.4 oz (146.2 kg) 46.26 kg/m2    Smoking Status                   Former Smoker           Additional Information about your Body Mass Index (BMI)           Your BMI as listed above is considered obese (30 or more). BMI is an estimate of body fat, calculated from your height and weight. The higher your BMI, the greater your risk of heart disease, high blood pressure, type 2 diabetes, stroke, gallstones, arthritis, sleep apnea, and certain cancers. BMI is not perfect. It may overestimate body fat in athletes and people who are more muscular. Even a small weight loss (between 5 and 10 percent of your current weight) by decreasing your calorie intake and becoming more physically active will help lower your risk of developing or worsening diseases associated with obesity.      Learn more at: [employer]             Where to Get Your Medications      These medications were sent to [address] [place] [address] [address], [address]  [address] [facility] [address]     Phone:  [phone]     timolol 0.5 % ophthalmic solution         Your Current Medications Are              timolol (TIMOPTIC) 0.5 % ophthalmic solution Place 1 drop into both eyes 2 times daily Indications: Disease of the Eye    OXYGEN by Nasal route See Admin Instructions Indications: Oxygen Therapy    fludrocortisone (FLORINEF) 0.1 MG tablet Take 0.1 mg by mouth daily Indications: Blood Pressure Drop Upon Standing     fluticasone (FLONASE) 50 MCG/ACT nasal spray instill 2 sprays into each nostril twice a day    sevelamer (RENVELA) 800 MG tablet Take 1 tablet by mouth 3 times daily (with meals) 1 Sample Box, Lot [id number], Exp Feb 2019    metoprolol tartrate (LOPRESSOR) 25 MG tablet Take 0.5 tablets by mouth Daily    tamsulosin (FLOMAX) 0.4 MG capsule Take 1 capsule by mouth daily Date Due    Tetanus Combination Vaccine (1 - Tdap) [birth year]28/1953    Eye Exam By An Eye Doctor 7/28/2009    Diabetic Foot Exam 1/20/2017    Cholesterol Screening 4/1/2018    Hemoglobin A1C (Test For Long-Term Glucose Control) 4/3/2018            MyChart Signup           Our records indicate that you have declined MyChart signup.

## 2017-10-04 ENCOUNTER — HOSPITAL ENCOUNTER (OUTPATIENT)
Dept: PHARMACY | Age: 82
Setting detail: THERAPIES SERIES
Discharge: HOME | End: 2017-10-04
Payer: MEDICARE

## 2017-10-04 VITALS
HEART RATE: 79 BPM | BODY MASS INDEX: 49.07 KG/M2 | DIASTOLIC BLOOD PRESSURE: 56 MMHG | SYSTOLIC BLOOD PRESSURE: 107 MMHG | WEIGHT: 315 LBS

## 2017-10-04 DIAGNOSIS — I26.99 OTHER PULMONARY EMBOLISM WITHOUT ACUTE COR PULMONALE, UNSPECIFIED CHRONICITY (HCC): ICD-10-CM

## 2017-10-04 DIAGNOSIS — I48.0 PAROXYSMAL ATRIAL FIBRILLATION (HCC): ICD-10-CM

## 2017-10-04 LAB — POC INR: 2.3 (ref 0.8–1.2)

## 2017-10-04 PROCEDURE — 99211 OFF/OP EST MAY X REQ PHY/QHP: CPT

## 2017-10-04 PROCEDURE — 36416 COLLJ CAPILLARY BLOOD SPEC: CPT

## 2017-10-04 PROCEDURE — 85610 PROTHROMBIN TIME: CPT

## 2017-10-04 ASSESSMENT — ENCOUNTER SYMPTOMS
DIARRHEA: 0
SHORTNESS OF BREATH: 0
BLOOD IN STOOL: 0
CONSTIPATION: 0

## 2017-10-04 NOTE — IP AVS SNAPSHOT
Blood Pressure Pulse Weight Body Mass Index Smoking Status       107/56 (Site: Left Arm, Position: Sitting, Cuff Size: Large Adult) 79 342 lb (155.1 kg) 49.07 kg/m2 Former Smoker       Additional Information about your Body Mass Index (BMI)           Your BMI as listed above is considered obese (30 or more). BMI is an estimate of body fat, calculated from your height and weight. The higher your BMI, the greater your risk of heart disease, high blood pressure, type 2 diabetes, stroke, gallstones, arthritis, sleep apnea, and certain cancers. BMI is not perfect. It may overestimate body fat in athletes and people who are more muscular. Even a small weight loss (between 5 and 10 percent of your current weight) by decreasing your calorie intake and becoming more physically active will help lower your risk of developing or worsening diseases associated with obesity.      Learn more at: [email or web address]             Medications and Orders      Your Current Medications Are              timolol (TIMOPTIC) 0.5 % ophthalmic solution Place 1 drop into both eyes 2 times daily Indications: Disease of the Eye    OXYGEN by Nasal route See Admin Instructions Indications: Oxygen Therapy    fludrocortisone (FLORINEF) 0.1 MG tablet Take 0.1 mg by mouth daily Indications: Blood Pressure Drop Upon Standing     fluticasone (FLONASE) 50 MCG/ACT nasal spray instill 2 sprays into each nostril twice a day    sevelamer (RENVELA) 800 MG tablet Take 1 tablet by mouth 3 times daily (with meals) 1 Sample Box, Lot [id number], Exp Feb 2019    metoprolol tartrate (LOPRESSOR) 25 MG tablet Take 0.5 tablets by mouth Daily    tamsulosin (FLOMAX) 0.4 MG capsule Take 1 capsule by mouth daily    insulin glargine (LANTUS SOLOSTAR) 100 UNIT/ML injection pen 50 units at night    NOVOLOG FLEXPEN 100 UNIT/ML injection pen Per sliding scale Dx E11.9    ONETOUCH VERIO strip 1 each by In Vitro route 3 times daily B Complex-C-Folic Acid (TRIPHROCAPS) 1 MG CAPS Take 1 capsule by mouth daily Indications: Kidney Disease     warfarin (COUMADIN) 3 MG tablet Take as directed by [facility].  180 tablets for 90 days    warfarin (COUMADIN) 2.5 MG tablet Indications: Anticoagulant Therapy, Atrial Fibrillation, Blockage of Blood Vessel to Lung by a Particle Take as directed by [facility].     glucose (GLUTOSE) 40 % GEL Take 15 g by mouth as needed (hypoglycemia)    midodrine (PROAMATINE) 10 MG tablet Take 1 tablet by mouth 3 times daily (with meals)    acetaminophen (TYLENOL) 325 MG tablet Take 2 tablets by mouth every 4 hours as needed for Fever    aspirin 81 MG chewable tablet Take 1 tablet by mouth daily    isosorbide mononitrate (IMDUR) 30 MG extended release tablet Take 30 mg by mouth daily Indications: Treatment to Prevent Angina     Amino Acids-Protein Hydrolys (PRO-STAT RC) LIQD Take 1 tablet by mouth daily Indications: Kidney Disease     senna (SENOKOT) 8.6 MG tablet Take 1 tablet by mouth 2 times daily Indications: Constipation, now taking as needed instead of scheduled     ondansetron (ZOFRAN-ODT) 4 MG disintegrating tablet Take 4 mg by mouth 4 times daily as needed for Nausea or Vomiting      Allergies           No Known Allergies      We Ordered/Performed the following           POCT INR          Result Summary for POCT INR      Result Information       Status          Abnormal Final result (Collected: 10/4/2017  2:40 PM)           10/4/2017  2:42 PM      Component Results     Component Value Ref Range & Units Status    POC INR 2.30 (H) 0.80 - 1.20 Final    ---------INDICATION-----------------------INR Reference Range  DVT, PE, AF, AMI, tissue heart valve          2.0 to 3.0  Mechanical prosthetic valves                  2.5 to 3.5  Performed at [address] [place] [address], [address]                 Additional Information        Basic Information Date Of Birth Sex Race Ethnicity Preferred Language    1934 Male Black Non-/Non  English      Problem List as of 10/4/2017  Date Reviewed: 10/4/2017                Anemia, chronic disease    Heart failure, systolic and diastolic (HCC)    Coronary artery disease involving native coronary artery of native heart without angina pectoris    Type 2 diabetes mellitus with stage 4 chronic kidney disease, with long-term current use of insulin (HCC)    KELSEY on CPAP    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    COPD (chronic obstructive pulmonary disease) ([facility]    Morbid obesity with BMI of 50.0-59.9, adult ([facility]    Anticoagulated on Coumadin    Atrial flutter ([facility]    Pulmonary embolus (HCC)    Chronic bronchitis (HCC)    Hyperphosphatemia (Chronic)    Cholelithiases    Hemodialysis patient ([facility]    Prostate enlargement    Lesion of left native kidney    Essential hypertension    Hyperlipidemia    Iron deficiency anemia    Vitamin D deficiency    Secondary hyperparathyroidism of renal origin ([facility]    On home oxygen therapy      Your Goals as of 10/4/2017              10/3/17    6/20/17    4/1/17       Result Component    HEMOGLOBIN A1C < 7.0   6.2  6.4      LDL CALC < 130       49      Immunizations as of 10/4/2017     Name Date    Influenza Vaccine, unspecified formulation 10/5/2016    Influenza Virus Vaccine 10/13/2015, 12/4/2014, 11/26/2013, 10/16/2012, 9/29/2011    Influenza, Elvia Kem, 3 Years and older, IM 10/3/2017    Pneumococcal 13-valent Conjugate ([id number]) 1/14/2016    Pneumococcal Polysaccharide ([id number]) 4/15/2017, 9/29/2011      Preventive Care        Date Due    Tetanus Combination Vaccine (1 - Tdap) 7/28/1953    Eye Exam By An Eye Doctor 7/28/2009    Diabetic Foot Exam 1/20/2017    Cholesterol Screening 4/1/2018    Hemoglobin A1C (Test For Long-Term Glucose Control) 4/3/2018            MyChart Signup           Our records indicate that you have declined MyChart signup.              October 2017 Details

## 2017-10-04 NOTE — PROGRESS NOTES
medication changes. Patient given instructions utilizing the teach back method. Discharged via wheelchair in no apparent distress.

## 2017-10-10 ENCOUNTER — TELEPHONE (OUTPATIENT)
Dept: FAMILY MEDICINE CLINIC | Age: 82
End: 2017-10-10

## 2017-10-18 ENCOUNTER — NURSE ONLY (OUTPATIENT)
Dept: CARDIOLOGY CLINIC | Age: 82
End: 2017-10-18
Payer: MEDICARE

## 2017-10-18 ENCOUNTER — OFFICE VISIT (OUTPATIENT)
Dept: CARDIOLOGY CLINIC | Age: 82
End: 2017-10-18
Payer: MEDICARE

## 2017-10-18 VITALS
HEIGHT: 70 IN | DIASTOLIC BLOOD PRESSURE: 58 MMHG | SYSTOLIC BLOOD PRESSURE: 103 MMHG | WEIGHT: 315 LBS | HEART RATE: 81 BPM | BODY MASS INDEX: 45.1 KG/M2

## 2017-10-18 DIAGNOSIS — I48.0 PAROXYSMAL ATRIAL FIBRILLATION (HCC): ICD-10-CM

## 2017-10-18 DIAGNOSIS — Z95.0 PACEMAKER: Primary | ICD-10-CM

## 2017-10-18 DIAGNOSIS — Z95.0 PACEMAKER: ICD-10-CM

## 2017-10-18 DIAGNOSIS — I10 ESSENTIAL HYPERTENSION: ICD-10-CM

## 2017-10-18 DIAGNOSIS — I50.42 CHRONIC COMBINED SYSTOLIC AND DIASTOLIC HEART FAILURE (HCC): ICD-10-CM

## 2017-10-18 DIAGNOSIS — I48.92 ATRIAL FLUTTER, UNSPECIFIED TYPE (HCC): Primary | ICD-10-CM

## 2017-10-18 PROCEDURE — 1123F ACP DISCUSS/DSCN MKR DOCD: CPT | Performed by: INTERNAL MEDICINE

## 2017-10-18 PROCEDURE — 93000 ELECTROCARDIOGRAM COMPLETE: CPT | Performed by: INTERNAL MEDICINE

## 2017-10-18 PROCEDURE — 1036F TOBACCO NON-USER: CPT | Performed by: INTERNAL MEDICINE

## 2017-10-18 PROCEDURE — G8417 CALC BMI ABV UP PARAM F/U: HCPCS | Performed by: INTERNAL MEDICINE

## 2017-10-18 PROCEDURE — G8427 DOCREV CUR MEDS BY ELIG CLIN: HCPCS | Performed by: INTERNAL MEDICINE

## 2017-10-18 PROCEDURE — 99213 OFFICE O/P EST LOW 20 MIN: CPT | Performed by: INTERNAL MEDICINE

## 2017-10-18 PROCEDURE — 93288 INTERROG EVL PM/LDLS PM IP: CPT | Performed by: INTERNAL MEDICINE

## 2017-10-18 PROCEDURE — 4040F PNEUMOC VAC/ADMIN/RCVD: CPT | Performed by: INTERNAL MEDICINE

## 2017-10-18 PROCEDURE — G8598 ASA/ANTIPLAT THER USED: HCPCS | Performed by: INTERNAL MEDICINE

## 2017-10-18 PROCEDURE — G8484 FLU IMMUNIZE NO ADMIN: HCPCS | Performed by: INTERNAL MEDICINE

## 2017-10-18 ASSESSMENT — ENCOUNTER SYMPTOMS
EYES NEGATIVE: 1
RESPIRATORY NEGATIVE: 1
GASTROINTESTINAL NEGATIVE: 1

## 2017-10-18 NOTE — PROGRESS NOTES
Patient here for 6 month follow up. EKG done today. Patient complains of SOB on exertion and LUNA in ankles. Patient denies chest pain or Palpitations.

## 2017-10-18 NOTE — PROGRESS NOTES
Medication Sig Dispense Refill    insulin glargine (BASAGLAR KWIKPEN) 100 UNIT/ML injection pen 50 units at night 5 Pen 3    timolol (TIMOPTIC) 0.5 % ophthalmic solution Place 1 drop into both eyes 2 times daily Indications: Disease of the Eye 15 mL 1    OXYGEN by Nasal route See Admin Instructions Indications: Oxygen Therapy      fludrocortisone (FLORINEF) 0.1 MG tablet Take 0.1 mg by mouth daily Indications: Blood Pressure Drop Upon Standing       fluticasone (FLONASE) 50 MCG/ACT nasal spray instill 2 sprays into each nostril twice a day 48 g 2    sevelamer (RENVELA) 800 MG tablet Take 1 tablet by mouth 3 times daily (with meals) 1 Sample Box, Lot [id number], Exp Feb 2019 30 tablet 0    metoprolol tartrate (LOPRESSOR) 25 MG tablet Take 0.5 tablets by mouth Daily 60 tablet 3    tamsulosin (FLOMAX) 0.4 MG capsule Take 1 capsule by mouth daily 30 capsule 5    insulin glargine (LANTUS SOLOSTAR) 100 UNIT/ML injection pen 50 units at night 15 Pen 0    NOVOLOG FLEXPEN 100 UNIT/ML injection pen Per sliding scale Dx E11.9 5 Pen 5    ONETOUCH VERIO strip 1 each by In Vitro route 3 times daily 300 each 0    B Complex-C-Folic Acid (TRIPHROCAPS) 1 MG CAPS Take 1 capsule by mouth daily Indications: Kidney Disease   0    warfarin (COUMADIN) 3 MG tablet Take as directed by [facility]. 180 tablets for 90 days 180 tablet 3    warfarin (COUMADIN) 2.5 MG tablet Indications: Anticoagulant Therapy, Atrial Fibrillation, Blockage of Blood Vessel to Lung by a Particle Take as directed by [facility].        glucose (GLUTOSE) 40 % GEL Take 15 g by mouth as needed (hypoglycemia) 45 g 1    midodrine (PROAMATINE) 10 MG tablet Take 1 tablet by mouth 3 times daily (with meals)      acetaminophen (TYLENOL) 325 MG tablet Take 2 tablets by mouth every 4 hours as needed for Fever 120 tablet 3    aspirin 81 MG chewable tablet Take 1 tablet by mouth daily 30 tablet 3    isosorbide mononitrate (IMDUR) 30 MG extended release tablet Take 30 mg by mouth daily Indications: Treatment to Prevent Angina       Amino Acids-Protein Hydrolys (PRO-STAT RC) LIQD Take 1 tablet by mouth daily Indications: Kidney Disease       senna (SENOKOT) 8.6 MG tablet Take 1 tablet by mouth 2 times daily Indications: Constipation, now taking as needed instead of scheduled       ondansetron (ZOFRAN-ODT) 4 MG disintegrating tablet Take 4 mg by mouth 4 times daily as needed for Nausea or Vomiting       No current facility-administered medications for this visit. EKG: Atrial fibrillation with paced ventricular beats. Subjective:      Review of Systems   Constitutional: Negative. HENT: Negative. Eyes: Negative. Respiratory: Negative. Cardiovascular: Negative. Gastrointestinal: Negative. Genitourinary: Negative. Musculoskeletal: Negative. Skin: Negative. Neurological: Negative. Psychiatric/Behavioral: Negative. Objective:     Physical Exam   Constitutional: He is oriented to person, place, and time. He appears well-developed and well-nourished. No distress. HENT:   Head: Normocephalic and atraumatic. Mouth/Throat: Oropharynx is clear and moist.   Eyes: Conjunctivae and EOM are normal. Pupils are equal, round, and reactive to light. No scleral icterus. Neck: Normal range of motion. Neck supple. No JVD present. No thyromegaly present. Cardiovascular: Normal rate and regular rhythm. Murmur heard. Systolic murmur is present with a grade of 2/6   Pulses:       Radial pulses are 2+ on the right side, and 2+ on the left side. Femoral pulses are 2+ on the right side, and 2+ on the left side. Popliteal pulses are 2+ on the right side, and 2+ on the left side. Dorsalis pedis pulses are 2+ on the right side, and 2+ on the left side. Posterior tibial pulses are 2+ on the right side, and 2+ on the left side. B/L 1+ pitting edema in the lower extremities. calcification. There was minimal calcification. Aortic valve: There was moderate stenosis. There was no regurgitation. Mean gradient estimated: 23 mmHg. Valve area estimated: 1.16 cm2. Peak gradient estimated: 48 mmHg. Tricuspid valve: There was mild regurgitation. Pericardium:  There was no pericardial effusion. Pulmonary arteries:  Systolic pressure was within the normal range    The following diagnoses were addressed during this visit:  1. Atrial flutter, unspecified type ([address]  EKG 12 Lead   2. 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     3. Chronic combined systolic and diastolic heart failure ([address]     4. Essential hypertension     5. Paroxysmal atrial fibrillation (HCC)           Plan:   Patient here for 6 month follow up. EKG done today. Atrial fibrillation. Paced QRS complexes. Patient complains of SOB on exertion and LUNA in ankles. Patient denies chest pain or Palpitations    He did not have surgery on the kidney. He is not aware of that. I had his pacemaker placed in 8/2009. It has a longevity of 0.5-1.25 years. He will have it checked again in April. Creatinine has gotten worse since last time. It is probably because of increasing the dose of zaroxolyn. Dr. Debra Carbone asked him to cut back to 3x week and we will check the level shortly. Orders Placed:  Orders Placed This Encounter   Procedures    EKG 12 Lead     Order Specific Question:   Reason for Exam?     Answer: Other     Medications:  No orders of the defined types were placed in this encounter. The patient will be seen in 6 months for re-evaluation or sooner if he develops new symptoms. In the mean time, the patient will follow up with Fidel Khanna MD   as scheduled.

## 2017-10-18 NOTE — PROGRESS NOTES
Battery watch 0.5-1.25 years on device   At [place]utt   At imped 444   RV threshold 0.625 @ 0.4  R waves 4.1-5    Will recheck in April

## 2017-11-01 ENCOUNTER — HOSPITAL ENCOUNTER (OUTPATIENT)
Dept: PHARMACY | Age: 82
Setting detail: THERAPIES SERIES
Discharge: HOME | End: 2017-11-01
Payer: MEDICARE

## 2017-11-01 VITALS — SYSTOLIC BLOOD PRESSURE: 118 MMHG | TEMPERATURE: 97 F | HEART RATE: 79 BPM | DIASTOLIC BLOOD PRESSURE: 67 MMHG

## 2017-11-01 DIAGNOSIS — I48.0 PAROXYSMAL ATRIAL FIBRILLATION (HCC): ICD-10-CM

## 2017-11-01 DIAGNOSIS — I26.99 OTHER PULMONARY EMBOLISM WITHOUT ACUTE COR PULMONALE, UNSPECIFIED CHRONICITY (HCC): ICD-10-CM

## 2017-11-01 LAB — POC INR: 2.1 (ref 0.8–1.2)

## 2017-11-01 PROCEDURE — 36416 COLLJ CAPILLARY BLOOD SPEC: CPT

## 2017-11-01 PROCEDURE — 99211 OFF/OP EST MAY X REQ PHY/QHP: CPT

## 2017-11-01 PROCEDURE — 85610 PROTHROMBIN TIME: CPT

## 2017-11-01 ASSESSMENT — ENCOUNTER SYMPTOMS
SHORTNESS OF BREATH: 0
BLOOD IN STOOL: 0
DIARRHEA: 0
CONSTIPATION: 0

## 2017-11-01 NOTE — PROGRESS NOTES
[facility]  Anticoagulation Clinic  [phone] (phone)           [phone] (fax)    Visit Date: 11/1/2017     Subjective:       Patient ID: Fouzia Muñiz, 80 y.o., male    HPI  Patient seen in clinic for anticoagulation management for diagnoses of paroxysmal atrial fib, PE with a goal INR of 2.0-3.0. Last seen 4 weeks ago. States correct coumadin dose and tablet strength. Denies missed doses. Review of Systems   Constitutional: Negative for activity change, appetite change and fatigue. HENT: Negative for nosebleeds. Respiratory: Negative for shortness of breath. Cardiovascular: Negative for chest pain and leg swelling ( usual in ankles). Gastrointestinal: Negative for blood in stool, constipation and diarrhea. Genitourinary: Negative for hematuria. Neurological: Negative for dizziness, weakness, light-headedness and headaches. Hematological: Does not bruise/bleed easily.        Objective:   Physical Exam   Vitals:    11/01/17 1429   BP: 118/67   Pulse: 79   Temp: 97 °F (36.1 °C)       Physical Exam   Constitutional: He is oriented to person, place, and time. He appears well-developed and well-nourished. Cardiovascular: Normal rate. Pulmonary/Chest: Effort normal.   Neurological: He is alert and oriented to person, place, and time. Psychiatric: He has a normal mood and affect. His behavior is normal.       Assessment:     Lab Results   Component Value Date    INR 2.10 (H) 11/01/2017    INR 2.30 (H) 10/04/2017    INR 2.60 (H) 09/06/2017     INR therapeutic   Recent Labs      11/01/17   1426   INR  2.10*                           Plan:   POCT INR ordered and result reviewed. Continue Coumadin 9 mg po MF, 6 mg po TWTHSS. Recheck INR 4 weeks. Patient reminded to call the Anticoagulation Clinic with any signs or symptoms of bleeding or with any medication changes. Patient given instructions utilizing the teach back method.     Discharged via wheelchair in no apparent distress with wife.

## 2017-11-29 ENCOUNTER — HOSPITAL ENCOUNTER (OUTPATIENT)
Dept: PHARMACY | Age: 82
Setting detail: THERAPIES SERIES
Discharge: HOME | End: 2017-11-29
Payer: MEDICARE

## 2017-11-29 DIAGNOSIS — I48.0 PAROXYSMAL ATRIAL FIBRILLATION (HCC): ICD-10-CM

## 2017-11-29 DIAGNOSIS — I26.99 OTHER PULMONARY EMBOLISM WITHOUT ACUTE COR PULMONALE, UNSPECIFIED CHRONICITY (HCC): ICD-10-CM

## 2017-11-29 LAB — POC INR: 4.3 (ref 0.8–1.2)

## 2017-11-29 PROCEDURE — 85610 PROTHROMBIN TIME: CPT

## 2017-11-29 PROCEDURE — 36416 COLLJ CAPILLARY BLOOD SPEC: CPT

## 2017-11-29 PROCEDURE — 99211 OFF/OP EST MAY X REQ PHY/QHP: CPT

## 2017-11-29 ASSESSMENT — ENCOUNTER SYMPTOMS
BLOOD IN STOOL: 0
DIARRHEA: 1
SHORTNESS OF BREATH: 0
CONSTIPATION: 0

## 2017-12-13 ENCOUNTER — HOSPITAL ENCOUNTER (OUTPATIENT)
Dept: PHARMACY | Age: 82
Setting detail: THERAPIES SERIES
Discharge: HOME | End: 2017-12-13
Payer: MEDICARE

## 2017-12-13 DIAGNOSIS — I26.99 OTHER PULMONARY EMBOLISM WITHOUT ACUTE COR PULMONALE, UNSPECIFIED CHRONICITY (HCC): ICD-10-CM

## 2017-12-13 DIAGNOSIS — I48.0 PAROXYSMAL ATRIAL FIBRILLATION (HCC): ICD-10-CM

## 2017-12-13 LAB — POC INR: 2.6 (ref 0.8–1.2)

## 2017-12-13 PROCEDURE — 99211 OFF/OP EST MAY X REQ PHY/QHP: CPT

## 2017-12-13 PROCEDURE — 36416 COLLJ CAPILLARY BLOOD SPEC: CPT

## 2017-12-13 PROCEDURE — 85610 PROTHROMBIN TIME: CPT

## 2017-12-13 ASSESSMENT — ENCOUNTER SYMPTOMS
CONSTIPATION: 0
SHORTNESS OF BREATH: 0
BLOOD IN STOOL: 0
DIARRHEA: 0

## 2017-12-13 NOTE — PROGRESS NOTES
The [facility]  Anticoagulation Clinic  [phone] (phone)           [phone] (fax)    Visit Date: 12/13/2017     Subjective:       Patient ID: Cayla Corona, 80 y.o., male    HPI  Here for 2 week coumadin monitoring for diagnoses of paroxysmal atrial fib, PE. Wife states correct coumadin dose and tablet strength. Denies missed doses. Denies dietary changes. Review of Systems   Constitutional: Negative for activity change, appetite change and fatigue. HENT: Positive for nosebleeds ( on tissue from right nare). Respiratory: Negative for shortness of breath ( usual). Cardiovascular: Negative for chest pain and leg swelling. Gastrointestinal: Negative for blood in stool, constipation and diarrhea. Genitourinary: Negative for hematuria. Neurological: Negative for weakness, light-headedness and headaches. Hematological: Does not bruise/bleed easily.        Objective:   Physical Exam   There were no vitals filed for this visit. Physical Exam   Constitutional: He is oriented to person, place, and time. He appears well-developed and well-nourished. Pulmonary/Chest: Effort normal and breath sounds normal.   Neurological: He is alert and oriented to person, place, and time. Psychiatric: He has a normal mood and affect. His behavior is normal.       Assessment:      Lab Results   Component Value Date    INR 2.60 (H) 12/13/2017    INR 4.30 (H) 11/29/2017    INR 2.10 (H) 11/01/2017     INR therapeutic   Recent Labs      12/13/17   1538   INR  2.60*                             Plan:   POCT INR ordered and result reviewed. Continue Coumadin 9 mg po MF, 6 mg po TWTHSS. Recheck INR 4 weeks. Patient reminded to call the Anticoagulation Clinic with any signs or symptoms of bleeding or with any medication changes. Patient given instructions utilizing the teach back method. Discharged via wheelchair in no apparent distress with wife.

## 2017-12-21 LAB
BASOPHILS ABSOLUTE: ABNORMAL /ΜL
BASOPHILS RELATIVE PERCENT: ABNORMAL %
BUN BLDV-MCNC: NORMAL MG/DL
CALCIUM SERPL-MCNC: 9.3 MG/DL
CHLORIDE BLD-SCNC: 96 MMOL/L
CO2: NORMAL MMOL/L
CREAT SERPL-MCNC: 10.74 MG/DL
EOSINOPHILS ABSOLUTE: ABNORMAL /ΜL
EOSINOPHILS RELATIVE PERCENT: ABNORMAL %
GFR CALCULATED: NORMAL
GLUCOSE BLD-MCNC: NORMAL MG/DL
HCT VFR BLD CALC: 35 % (ref 41–53)
HEMOGLOBIN: 11.3 G/DL (ref 13.5–17.5)
LYMPHOCYTES ABSOLUTE: ABNORMAL /ΜL
LYMPHOCYTES RELATIVE PERCENT: ABNORMAL %
MCH RBC QN AUTO: 29.8 PG
MCHC RBC AUTO-ENTMCNC: 32.2 G/DL
MCV RBC AUTO: 93 FL
MONOCYTES ABSOLUTE: ABNORMAL /ΜL
MONOCYTES RELATIVE PERCENT: ABNORMAL %
NEUTROPHILS ABSOLUTE: ABNORMAL /ΜL
NEUTROPHILS RELATIVE PERCENT: ABNORMAL %
PLATELET # BLD: ABNORMAL K/ΜL
PMV BLD AUTO: ABNORMAL FL
POTASSIUM SERPL-SCNC: 5 MMOL/L
RBC # BLD: 3.78 10^6/ΜL
SODIUM BLD-SCNC: 136 MMOL/L
WBC # BLD: 6.82 10^3/ML

## 2018-01-04 ENCOUNTER — OFFICE VISIT (OUTPATIENT)
Dept: FAMILY MEDICINE CLINIC | Age: 83
End: 2018-01-04

## 2018-01-04 VITALS
WEIGHT: 315 LBS | HEIGHT: 70 IN | SYSTOLIC BLOOD PRESSURE: 92 MMHG | BODY MASS INDEX: 45.1 KG/M2 | HEART RATE: 80 BPM | DIASTOLIC BLOOD PRESSURE: 54 MMHG | RESPIRATION RATE: 14 BRPM

## 2018-01-04 DIAGNOSIS — D63.8 ANEMIA, CHRONIC DISEASE: ICD-10-CM

## 2018-01-04 DIAGNOSIS — N18.6 ESRD (END STAGE RENAL DISEASE) ON DIALYSIS (HCC): ICD-10-CM

## 2018-01-04 DIAGNOSIS — E78.5 HYPERLIPIDEMIA, UNSPECIFIED HYPERLIPIDEMIA TYPE: ICD-10-CM

## 2018-01-04 DIAGNOSIS — Z99.2 ESRD (END STAGE RENAL DISEASE) ON DIALYSIS (HCC): ICD-10-CM

## 2018-01-04 DIAGNOSIS — E66.01 MORBID OBESITY WITH BMI OF 45.0-49.9, ADULT (HCC): ICD-10-CM

## 2018-01-04 DIAGNOSIS — I48.0 PAROXYSMAL ATRIAL FIBRILLATION (HCC): ICD-10-CM

## 2018-01-04 DIAGNOSIS — I50.42 CHRONIC COMBINED SYSTOLIC AND DIASTOLIC HEART FAILURE (HCC): ICD-10-CM

## 2018-01-04 DIAGNOSIS — E11.22 TYPE 2 DIABETES MELLITUS WITH STAGE 4 CHRONIC KIDNEY DISEASE, WITH LONG-TERM CURRENT USE OF INSULIN (HCC): Primary | ICD-10-CM

## 2018-01-04 DIAGNOSIS — I48.92 ATRIAL FLUTTER, UNSPECIFIED TYPE (HCC): ICD-10-CM

## 2018-01-04 DIAGNOSIS — Z79.4 TYPE 2 DIABETES MELLITUS WITH STAGE 4 CHRONIC KIDNEY DISEASE, WITH LONG-TERM CURRENT USE OF INSULIN (HCC): Primary | ICD-10-CM

## 2018-01-04 DIAGNOSIS — I10 ESSENTIAL HYPERTENSION: ICD-10-CM

## 2018-01-04 DIAGNOSIS — E11.21 DM KIDNEY DISEASE (HCC): ICD-10-CM

## 2018-01-04 DIAGNOSIS — I50.42 CHRONIC COMBINED SYSTOLIC AND DIASTOLIC CHF (CONGESTIVE HEART FAILURE) (HCC): ICD-10-CM

## 2018-01-04 DIAGNOSIS — J44.9 CHRONIC OBSTRUCTIVE PULMONARY DISEASE, UNSPECIFIED COPD TYPE (HCC): ICD-10-CM

## 2018-01-04 DIAGNOSIS — N18.4 TYPE 2 DIABETES MELLITUS WITH STAGE 4 CHRONIC KIDNEY DISEASE, WITH LONG-TERM CURRENT USE OF INSULIN (HCC): Primary | ICD-10-CM

## 2018-01-04 DIAGNOSIS — N18.5 CHRONIC KIDNEY DISEASE, STAGE V (VERY SEVERE) (HCC): ICD-10-CM

## 2018-01-04 LAB
GLUCOSE BLD-MCNC: 199 MG/DL
HBA1C MFR BLD: 7.6 %

## 2018-01-04 PROCEDURE — 82962 GLUCOSE BLOOD TEST: CPT | Performed by: EMERGENCY MEDICINE

## 2018-01-04 PROCEDURE — 83036 HEMOGLOBIN GLYCOSYLATED A1C: CPT | Performed by: EMERGENCY MEDICINE

## 2018-01-04 PROCEDURE — 99213 OFFICE O/P EST LOW 20 MIN: CPT | Performed by: EMERGENCY MEDICINE

## 2018-01-04 RX ORDER — INSULIN ASPART 100 [IU]/ML
INJECTION, SOLUTION INTRAVENOUS; SUBCUTANEOUS
Qty: 5 PEN | Refills: 5 | Status: SHIPPED | OUTPATIENT
Start: 2018-01-04 | End: 2018-08-09 | Stop reason: SDUPTHER

## 2018-01-04 RX ORDER — MIDODRINE HYDROCHLORIDE 10 MG/1
10 TABLET ORAL
Qty: 270 TABLET | Refills: 1 | Status: SHIPPED | OUTPATIENT
Start: 2018-01-04 | End: 2018-04-10 | Stop reason: SDUPTHER

## 2018-01-04 RX ORDER — TAMSULOSIN HYDROCHLORIDE 0.4 MG/1
0.4 CAPSULE ORAL DAILY
Qty: 30 CAPSULE | Refills: 5 | Status: SHIPPED | OUTPATIENT
Start: 2018-01-04 | End: 2018-04-10 | Stop reason: SDUPTHER

## 2018-01-04 RX ORDER — FLUTICASONE PROPIONATE 50 MCG
SPRAY, SUSPENSION (ML) NASAL
Qty: 48 G | Refills: 2 | Status: SHIPPED | OUTPATIENT
Start: 2018-01-04 | End: 2018-06-23 | Stop reason: SDUPTHER

## 2018-01-04 RX ORDER — ASPIRIN 81 MG/1
81 TABLET, CHEWABLE ORAL DAILY
Qty: 100 TABLET | Refills: 1 | Status: SHIPPED | OUTPATIENT
Start: 2018-01-04

## 2018-01-04 RX ORDER — B COMPLEX, C NO.20/FOLIC ACID 1 MG
1 CAPSULE ORAL DAILY
Qty: 30 CAPSULE | Refills: 5 | Status: SHIPPED | OUTPATIENT
Start: 2018-01-04 | End: 2018-04-10 | Stop reason: SDUPTHER

## 2018-01-04 RX ORDER — ISOSORBIDE MONONITRATE 30 MG/1
30 TABLET, EXTENDED RELEASE ORAL DAILY
Qty: 30 TABLET | Refills: 5 | Status: SHIPPED | OUTPATIENT
Start: 2018-01-04 | End: 2018-04-10 | Stop reason: SDUPTHER

## 2018-01-04 RX ORDER — FLUDROCORTISONE ACETATE 0.1 MG/1
0.1 TABLET ORAL DAILY
Qty: 90 TABLET | Refills: 1 | Status: SHIPPED | OUTPATIENT
Start: 2018-01-04 | End: 2018-04-10 | Stop reason: SDUPTHER

## 2018-01-04 RX ORDER — WARFARIN SODIUM 3 MG/1
TABLET ORAL
Qty: 180 TABLET | Refills: 3 | Status: SHIPPED | OUTPATIENT
Start: 2018-01-04 | End: 2018-03-07 | Stop reason: SDUPTHER

## 2018-01-04 ASSESSMENT — PATIENT HEALTH QUESTIONNAIRE - PHQ9
SUM OF ALL RESPONSES TO PHQ QUESTIONS 1-9: 0
1. LITTLE INTEREST OR PLEASURE IN DOING THINGS: 0
2. FEELING DOWN, DEPRESSED OR HOPELESS: 0
SUM OF ALL RESPONSES TO PHQ9 QUESTIONS 1 & 2: 0

## 2018-01-04 ASSESSMENT — ENCOUNTER SYMPTOMS
CONSTIPATION: 0
RHINORRHEA: 0
VOMITING: 0
NAUSEA: 0
TROUBLE SWALLOWING: 0
BACK PAIN: 0
SORE THROAT: 0
WHEEZING: 0
COUGH: 0
ABDOMINAL PAIN: 0
CHEST TIGHTNESS: 0
VOICE CHANGE: 0
SINUS PRESSURE: 0
VISUAL CHANGE: 0
SHORTNESS OF BREATH: 1
DIARRHEA: 0

## 2018-01-04 NOTE — PROGRESS NOTES
01/04/2018    LABA1C 6.2 10/03/2017    LABA1C 6.4 06/20/2017     Lab Results   Component Value Date    LDLCALC 49 04/01/2017    CREATININE 7.4 (HH) 04/01/2017       Continue to monitor blood sugars 3 times a day. Return in about 3 months (around 4/4/2018), or DM. Discussed use, benefit, and side effects of prescribed medications. All patient questions answered. Pt voiced understanding. Instructed to continue current medications, diet and exercise. Patient agreed with treatment plan.

## 2018-01-10 ENCOUNTER — HOSPITAL ENCOUNTER (OUTPATIENT)
Dept: PHARMACY | Age: 83
Setting detail: THERAPIES SERIES
Discharge: HOME | End: 2018-01-10
Payer: MEDICARE

## 2018-01-10 DIAGNOSIS — I48.0 PAROXYSMAL ATRIAL FIBRILLATION (HCC): ICD-10-CM

## 2018-01-10 DIAGNOSIS — I26.99 OTHER PULMONARY EMBOLISM WITHOUT ACUTE COR PULMONALE, UNSPECIFIED CHRONICITY (HCC): ICD-10-CM

## 2018-01-10 LAB — POC INR: 2.4 (ref 0.8–1.2)

## 2018-01-10 PROCEDURE — 85610 PROTHROMBIN TIME: CPT

## 2018-01-10 PROCEDURE — 36416 COLLJ CAPILLARY BLOOD SPEC: CPT

## 2018-01-10 PROCEDURE — 99211 OFF/OP EST MAY X REQ PHY/QHP: CPT

## 2018-02-07 ENCOUNTER — HOSPITAL ENCOUNTER (OUTPATIENT)
Dept: PHARMACY | Age: 83
Setting detail: THERAPIES SERIES
Discharge: HOME | End: 2018-02-07
Payer: MEDICARE

## 2018-02-07 DIAGNOSIS — I48.0 PAROXYSMAL ATRIAL FIBRILLATION (HCC): ICD-10-CM

## 2018-02-07 DIAGNOSIS — I26.99 OTHER PULMONARY EMBOLISM WITHOUT ACUTE COR PULMONALE, UNSPECIFIED CHRONICITY (HCC): ICD-10-CM

## 2018-02-07 LAB — POC INR: 3 (ref 0.8–1.2)

## 2018-02-07 PROCEDURE — 36416 COLLJ CAPILLARY BLOOD SPEC: CPT

## 2018-02-07 PROCEDURE — 99211 OFF/OP EST MAY X REQ PHY/QHP: CPT

## 2018-02-07 PROCEDURE — 85610 PROTHROMBIN TIME: CPT

## 2018-02-07 NOTE — PROGRESS NOTES
The [address] [facility]  Anticoagulation Clinic  [phone] (phone)  [phone] (fax)    Mr. John Christianson is a 80 y.o.  male with history of paroxysmal Afib, PE who presents today for anticoagulation monitoring and adjustment. Patient verifies current dosing regimen and tablet strength. No missed or extra doses. Patient denies s/s bleeding/bruising/swelling/SOB/chest pain  No blood in urine or stool. No dietary changes. No changes in medication/OTC agents/Herbals. No change in alcohol use or tobacco use. No change in activity level. Patient denies headaches/dizziness/lightheadedness/falls. No vomiting/diarrhea or acute illness. No Procedures scheduled in the future at this time. Assessment:   Lab Results   Component Value Date    INR 3.00 (H) 02/07/2018    INR 2.40 (H) 01/10/2018    INR 2.60 (H) 12/13/2017     INR therapeutic   Recent Labs      02/07/18   1526   INR  3.00*     Plan: POCT INR ordered and result reviewed. Continue Coumadin 9 mg po MF, 6 mg po TWTHSS. Recheck INR 4 weeks. Patient reminded to call the Anticoagulation Clinic with any signs or symptoms of bleeding or with any medication changes. Patient given instructions utilizing the teach back method. Discharged via wheelchair in no apparent distress. After visit summary printed and reviewed with patient.       Medications reviewed and updated on home medication list Yes    Influenza vaccine:     [] given    [] declined   [x] received previously   [] plans to receive at a later time   [] refused    [x] documented in EPIC

## 2018-02-15 RX ORDER — BLOOD SUGAR DIAGNOSTIC
1 STRIP MISCELLANEOUS 3 TIMES DAILY
Qty: 300 EACH | Refills: 3 | Status: SHIPPED | OUTPATIENT
Start: 2018-02-15 | End: 2018-02-22 | Stop reason: SDUPTHER

## 2018-02-22 LAB
AVERAGE GLUCOSE: NORMAL
HBA1C MFR BLD: 7.3 %

## 2018-02-22 RX ORDER — BLOOD SUGAR DIAGNOSTIC
1 STRIP MISCELLANEOUS 3 TIMES DAILY
Qty: 300 EACH | Refills: 3 | Status: SHIPPED | OUTPATIENT
Start: 2018-02-22 | End: 2019-02-26 | Stop reason: SDUPTHER

## 2018-03-07 ENCOUNTER — HOSPITAL ENCOUNTER (OUTPATIENT)
Dept: PHARMACY | Age: 83
Setting detail: THERAPIES SERIES
Discharge: HOME | End: 2018-03-07
Payer: MEDICARE

## 2018-03-07 DIAGNOSIS — I48.0 PAROXYSMAL ATRIAL FIBRILLATION (HCC): ICD-10-CM

## 2018-03-07 DIAGNOSIS — I26.99 OTHER PULMONARY EMBOLISM WITHOUT ACUTE COR PULMONALE, UNSPECIFIED CHRONICITY (HCC): ICD-10-CM

## 2018-03-07 LAB — POC INR: 2.8 (ref 0.8–1.2)

## 2018-03-07 PROCEDURE — 36416 COLLJ CAPILLARY BLOOD SPEC: CPT

## 2018-03-07 PROCEDURE — 99211 OFF/OP EST MAY X REQ PHY/QHP: CPT

## 2018-03-07 PROCEDURE — 85610 PROTHROMBIN TIME: CPT

## 2018-03-07 RX ORDER — WARFARIN SODIUM 3 MG/1
TABLET ORAL
Qty: 180 TABLET | Refills: 3 | Status: SHIPPED | OUTPATIENT
Start: 2018-03-07 | End: 2018-08-22 | Stop reason: SDUPTHER

## 2018-04-04 ENCOUNTER — OFFICE VISIT (OUTPATIENT)
Dept: CARDIOLOGY CLINIC | Age: 83
End: 2018-04-04
Payer: MEDICARE

## 2018-04-04 ENCOUNTER — NURSE ONLY (OUTPATIENT)
Dept: CARDIOLOGY CLINIC | Age: 83
End: 2018-04-04
Payer: MEDICARE

## 2018-04-04 VITALS
HEIGHT: 70 IN | BODY MASS INDEX: 45.1 KG/M2 | HEART RATE: 82 BPM | SYSTOLIC BLOOD PRESSURE: 117 MMHG | WEIGHT: 315 LBS | DIASTOLIC BLOOD PRESSURE: 66 MMHG

## 2018-04-04 DIAGNOSIS — I25.10 CORONARY ARTERY DISEASE INVOLVING NATIVE CORONARY ARTERY OF NATIVE HEART WITHOUT ANGINA PECTORIS: Primary | ICD-10-CM

## 2018-04-04 DIAGNOSIS — I48.0 PAROXYSMAL ATRIAL FIBRILLATION (HCC): ICD-10-CM

## 2018-04-04 DIAGNOSIS — Z95.0 PACEMAKER: ICD-10-CM

## 2018-04-04 DIAGNOSIS — Z95.0 PACEMAKER: Primary | ICD-10-CM

## 2018-04-04 PROCEDURE — G8598 ASA/ANTIPLAT THER USED: HCPCS | Performed by: INTERNAL MEDICINE

## 2018-04-04 PROCEDURE — 99213 OFFICE O/P EST LOW 20 MIN: CPT | Performed by: INTERNAL MEDICINE

## 2018-04-04 PROCEDURE — G8427 DOCREV CUR MEDS BY ELIG CLIN: HCPCS | Performed by: INTERNAL MEDICINE

## 2018-04-04 PROCEDURE — 93288 INTERROG EVL PM/LDLS PM IP: CPT | Performed by: INTERNAL MEDICINE

## 2018-04-04 PROCEDURE — 4040F PNEUMOC VAC/ADMIN/RCVD: CPT | Performed by: INTERNAL MEDICINE

## 2018-04-04 PROCEDURE — 93000 ELECTROCARDIOGRAM COMPLETE: CPT | Performed by: INTERNAL MEDICINE

## 2018-04-04 PROCEDURE — 1123F ACP DISCUSS/DSCN MKR DOCD: CPT | Performed by: INTERNAL MEDICINE

## 2018-04-04 PROCEDURE — 1036F TOBACCO NON-USER: CPT | Performed by: INTERNAL MEDICINE

## 2018-04-04 PROCEDURE — G8417 CALC BMI ABV UP PARAM F/U: HCPCS | Performed by: INTERNAL MEDICINE

## 2018-04-04 ASSESSMENT — ENCOUNTER SYMPTOMS
RESPIRATORY NEGATIVE: 1
EYES NEGATIVE: 1
GASTROINTESTINAL NEGATIVE: 1

## 2018-04-06 ENCOUNTER — HOSPITAL ENCOUNTER (OUTPATIENT)
Dept: PHARMACY | Age: 83
Setting detail: THERAPIES SERIES
Discharge: HOME | End: 2018-04-06
Payer: MEDICARE

## 2018-04-06 DIAGNOSIS — I48.0 PAROXYSMAL ATRIAL FIBRILLATION (HCC): ICD-10-CM

## 2018-04-06 DIAGNOSIS — I26.99 OTHER PULMONARY EMBOLISM WITHOUT ACUTE COR PULMONALE, UNSPECIFIED CHRONICITY (HCC): ICD-10-CM

## 2018-04-06 LAB — POC INR: 3 (ref 0.8–1.2)

## 2018-04-06 PROCEDURE — 85610 PROTHROMBIN TIME: CPT | Performed by: PHARMACIST

## 2018-04-06 PROCEDURE — 36416 COLLJ CAPILLARY BLOOD SPEC: CPT | Performed by: PHARMACIST

## 2018-04-06 PROCEDURE — 99211 OFF/OP EST MAY X REQ PHY/QHP: CPT | Performed by: PHARMACIST

## 2018-04-10 ENCOUNTER — OFFICE VISIT (OUTPATIENT)
Dept: FAMILY MEDICINE CLINIC | Age: 83
End: 2018-04-10

## 2018-04-10 VITALS
HEART RATE: 98 BPM | HEIGHT: 70 IN | SYSTOLIC BLOOD PRESSURE: 142 MMHG | RESPIRATION RATE: 16 BRPM | BODY MASS INDEX: 45.1 KG/M2 | WEIGHT: 315 LBS | DIASTOLIC BLOOD PRESSURE: 78 MMHG

## 2018-04-10 DIAGNOSIS — D50.9 IRON DEFICIENCY ANEMIA, UNSPECIFIED IRON DEFICIENCY ANEMIA TYPE: ICD-10-CM

## 2018-04-10 DIAGNOSIS — J44.9 CHRONIC OBSTRUCTIVE PULMONARY DISEASE, UNSPECIFIED COPD TYPE (HCC): ICD-10-CM

## 2018-04-10 DIAGNOSIS — E78.5 HYPERLIPIDEMIA, UNSPECIFIED HYPERLIPIDEMIA TYPE: ICD-10-CM

## 2018-04-10 DIAGNOSIS — N18.4 TYPE 2 DIABETES MELLITUS WITH STAGE 4 CHRONIC KIDNEY DISEASE, WITH LONG-TERM CURRENT USE OF INSULIN (HCC): Primary | ICD-10-CM

## 2018-04-10 DIAGNOSIS — I10 ESSENTIAL HYPERTENSION: ICD-10-CM

## 2018-04-10 DIAGNOSIS — I48.0 PAROXYSMAL ATRIAL FIBRILLATION (HCC): ICD-10-CM

## 2018-04-10 DIAGNOSIS — Z79.4 TYPE 2 DIABETES MELLITUS WITH STAGE 4 CHRONIC KIDNEY DISEASE, WITH LONG-TERM CURRENT USE OF INSULIN (HCC): Primary | ICD-10-CM

## 2018-04-10 DIAGNOSIS — E55.9 VITAMIN D DEFICIENCY: ICD-10-CM

## 2018-04-10 DIAGNOSIS — E11.22 TYPE 2 DIABETES MELLITUS WITH STAGE 4 CHRONIC KIDNEY DISEASE, WITH LONG-TERM CURRENT USE OF INSULIN (HCC): Primary | ICD-10-CM

## 2018-04-10 PROCEDURE — 99213 OFFICE O/P EST LOW 20 MIN: CPT | Performed by: EMERGENCY MEDICINE

## 2018-04-10 RX ORDER — TAMSULOSIN HYDROCHLORIDE 0.4 MG/1
0.4 CAPSULE ORAL DAILY
Qty: 90 CAPSULE | Refills: 1 | Status: SHIPPED | OUTPATIENT
Start: 2018-04-10 | End: 2018-12-10 | Stop reason: SDUPTHER

## 2018-04-10 RX ORDER — FLUDROCORTISONE ACETATE 0.1 MG/1
0.1 TABLET ORAL DAILY
Qty: 90 TABLET | Refills: 1 | Status: SHIPPED | OUTPATIENT
Start: 2018-04-10 | End: 2018-10-11 | Stop reason: SDUPTHER

## 2018-04-10 RX ORDER — B COMPLEX, C NO.20/FOLIC ACID 1 MG
1 CAPSULE ORAL DAILY
Qty: 90 CAPSULE | Refills: 1 | Status: SHIPPED | OUTPATIENT
Start: 2018-04-10 | End: 2019-01-13 | Stop reason: SDUPTHER

## 2018-04-10 RX ORDER — ISOSORBIDE MONONITRATE 30 MG/1
30 TABLET, EXTENDED RELEASE ORAL DAILY
Qty: 90 TABLET | Refills: 1 | Status: SHIPPED | OUTPATIENT
Start: 2018-04-10 | End: 2018-10-11 | Stop reason: SDUPTHER

## 2018-04-10 RX ORDER — MIDODRINE HYDROCHLORIDE 10 MG/1
10 TABLET ORAL
Qty: 270 TABLET | Refills: 1 | Status: SHIPPED | OUTPATIENT
Start: 2018-04-10 | End: 2018-10-04 | Stop reason: SDUPTHER

## 2018-05-04 ENCOUNTER — HOSPITAL ENCOUNTER (OUTPATIENT)
Dept: PHARMACY | Age: 83
Setting detail: THERAPIES SERIES
Discharge: HOME | End: 2018-05-04
Payer: MEDICARE

## 2018-05-04 DIAGNOSIS — I26.99 OTHER PULMONARY EMBOLISM WITHOUT ACUTE COR PULMONALE, UNSPECIFIED CHRONICITY (HCC): ICD-10-CM

## 2018-05-04 DIAGNOSIS — I48.0 PAROXYSMAL ATRIAL FIBRILLATION (HCC): ICD-10-CM

## 2018-05-04 LAB — POC INR: 2.1 (ref 0.8–1.2)

## 2018-05-04 PROCEDURE — 99211 OFF/OP EST MAY X REQ PHY/QHP: CPT

## 2018-05-04 PROCEDURE — 85610 PROTHROMBIN TIME: CPT

## 2018-05-04 PROCEDURE — 36416 COLLJ CAPILLARY BLOOD SPEC: CPT

## 2018-05-07 ENCOUNTER — TELEPHONE (OUTPATIENT)
Dept: PHARMACY | Age: 83
End: 2018-05-07

## 2018-06-06 ENCOUNTER — NURSE ONLY (OUTPATIENT)
Dept: CARDIOLOGY CLINIC | Age: 83
End: 2018-06-06
Payer: MEDICARE

## 2018-06-06 DIAGNOSIS — Z95.0 PACEMAKER: Primary | ICD-10-CM

## 2018-06-06 PROCEDURE — 93288 INTERROG EVL PM/LDLS PM IP: CPT | Performed by: INTERNAL MEDICINE

## 2018-06-08 ENCOUNTER — HOSPITAL ENCOUNTER (OUTPATIENT)
Dept: PHARMACY | Age: 83
Setting detail: THERAPIES SERIES
Discharge: HOME | End: 2018-06-08
Payer: MEDICARE

## 2018-06-08 DIAGNOSIS — I48.0 PAROXYSMAL ATRIAL FIBRILLATION (HCC): ICD-10-CM

## 2018-06-08 DIAGNOSIS — I26.99 OTHER PULMONARY EMBOLISM WITHOUT ACUTE COR PULMONALE, UNSPECIFIED CHRONICITY (HCC): ICD-10-CM

## 2018-06-08 LAB — POC INR: 2.7 (ref 0.8–1.2)

## 2018-06-08 PROCEDURE — 85610 PROTHROMBIN TIME: CPT

## 2018-06-08 PROCEDURE — 36416 COLLJ CAPILLARY BLOOD SPEC: CPT

## 2018-06-08 PROCEDURE — 99211 OFF/OP EST MAY X REQ PHY/QHP: CPT

## 2018-06-08 RX ORDER — AMMONIUM LACTATE 12 G/100G
CREAM TOPICAL
Refills: 11 | COMMUNITY
Start: 2018-04-11 | End: 2018-11-02 | Stop reason: ALTCHOICE

## 2018-06-21 ASSESSMENT — ENCOUNTER SYMPTOMS
CONSTIPATION: 0
CHEST TIGHTNESS: 0
WHEEZING: 0
VISUAL CHANGE: 0
NAUSEA: 0
SORE THROAT: 0
BACK PAIN: 0
SINUS PRESSURE: 0
COUGH: 0
VOICE CHANGE: 0
ABDOMINAL PAIN: 0
VOMITING: 0
TROUBLE SWALLOWING: 0
SHORTNESS OF BREATH: 1
DIARRHEA: 0
RHINORRHEA: 0

## 2018-06-26 RX ORDER — FLUTICASONE PROPIONATE 50 MCG
SPRAY, SUSPENSION (ML) NASAL
Qty: 1 BOTTLE | Refills: 2 | Status: SHIPPED | OUTPATIENT
Start: 2018-06-26 | End: 2018-08-07 | Stop reason: SDUPTHER

## 2018-07-06 ENCOUNTER — TELEPHONE (OUTPATIENT)
Dept: PHARMACY | Age: 83
End: 2018-07-06

## 2018-07-06 ENCOUNTER — TELEPHONE (OUTPATIENT)
Dept: CARDIOLOGY CLINIC | Age: 83
End: 2018-07-06

## 2018-07-06 NOTE — TELEPHONE ENCOUNTER
Pt called is having a possible tooth extraction, pt on coumadin, pt made aware Dr. Kaya Mistry has left practice and will need an appt with another cardiologist     If need to be cleared for procedure. Pt was told to see if PCP would clear for procedure or call dentist see if need cleared. Pt will call if needs anything .

## 2018-07-10 ENCOUNTER — OFFICE VISIT (OUTPATIENT)
Dept: FAMILY MEDICINE CLINIC | Age: 83
End: 2018-07-10

## 2018-07-10 VITALS
BODY MASS INDEX: 45.1 KG/M2 | HEIGHT: 70 IN | SYSTOLIC BLOOD PRESSURE: 108 MMHG | HEART RATE: 78 BPM | DIASTOLIC BLOOD PRESSURE: 64 MMHG | WEIGHT: 315 LBS | RESPIRATION RATE: 18 BRPM

## 2018-07-10 DIAGNOSIS — I10 ESSENTIAL HYPERTENSION: ICD-10-CM

## 2018-07-10 DIAGNOSIS — E78.5 HYPERLIPIDEMIA, UNSPECIFIED HYPERLIPIDEMIA TYPE: ICD-10-CM

## 2018-07-10 DIAGNOSIS — I25.10 CORONARY ARTERY DISEASE INVOLVING NATIVE CORONARY ARTERY OF NATIVE HEART WITHOUT ANGINA PECTORIS: ICD-10-CM

## 2018-07-10 DIAGNOSIS — Z79.4 TYPE 2 DIABETES MELLITUS WITH STAGE 4 CHRONIC KIDNEY DISEASE, WITH LONG-TERM CURRENT USE OF INSULIN (HCC): Primary | ICD-10-CM

## 2018-07-10 DIAGNOSIS — N18.4 TYPE 2 DIABETES MELLITUS WITH STAGE 4 CHRONIC KIDNEY DISEASE, WITH LONG-TERM CURRENT USE OF INSULIN (HCC): ICD-10-CM

## 2018-07-10 DIAGNOSIS — J44.9 CHRONIC OBSTRUCTIVE PULMONARY DISEASE, UNSPECIFIED COPD TYPE (HCC): ICD-10-CM

## 2018-07-10 DIAGNOSIS — N18.4 TYPE 2 DIABETES MELLITUS WITH STAGE 4 CHRONIC KIDNEY DISEASE, WITH LONG-TERM CURRENT USE OF INSULIN (HCC): Primary | ICD-10-CM

## 2018-07-10 DIAGNOSIS — E11.22 TYPE 2 DIABETES MELLITUS WITH STAGE 4 CHRONIC KIDNEY DISEASE, WITH LONG-TERM CURRENT USE OF INSULIN (HCC): ICD-10-CM

## 2018-07-10 DIAGNOSIS — Z79.4 TYPE 2 DIABETES MELLITUS WITH STAGE 4 CHRONIC KIDNEY DISEASE, WITH LONG-TERM CURRENT USE OF INSULIN (HCC): ICD-10-CM

## 2018-07-10 DIAGNOSIS — E11.22 TYPE 2 DIABETES MELLITUS WITH STAGE 4 CHRONIC KIDNEY DISEASE, WITH LONG-TERM CURRENT USE OF INSULIN (HCC): Primary | ICD-10-CM

## 2018-07-10 DIAGNOSIS — E55.9 VITAMIN D DEFICIENCY: ICD-10-CM

## 2018-07-10 DIAGNOSIS — Z95.0 PACEMAKER: ICD-10-CM

## 2018-07-10 DIAGNOSIS — D50.9 IRON DEFICIENCY ANEMIA, UNSPECIFIED IRON DEFICIENCY ANEMIA TYPE: ICD-10-CM

## 2018-07-10 LAB
CHOLESTEROL, TOTAL: NORMAL MG/DL
CHOLESTEROL/HDL RATIO: NORMAL
GLUCOSE BLD-MCNC: 237 MG/DL
HBA1C MFR BLD: 7.4 %
HDLC SERPL-MCNC: NORMAL MG/DL (ref 35–70)
LDL CHOLESTEROL CALCULATED: 151 MG/DL (ref 0–160)
TRIGL SERPL-MCNC: NORMAL MG/DL
VLDLC SERPL CALC-MCNC: NORMAL MG/DL

## 2018-07-10 PROCEDURE — 83036 HEMOGLOBIN GLYCOSYLATED A1C: CPT | Performed by: EMERGENCY MEDICINE

## 2018-07-10 PROCEDURE — 82962 GLUCOSE BLOOD TEST: CPT | Performed by: EMERGENCY MEDICINE

## 2018-07-10 PROCEDURE — 99213 OFFICE O/P EST LOW 20 MIN: CPT | Performed by: EMERGENCY MEDICINE

## 2018-07-10 RX ORDER — ATORVASTATIN CALCIUM 40 MG/1
40 TABLET, FILM COATED ORAL DAILY
Qty: 90 TABLET | Refills: 3 | Status: SHIPPED | OUTPATIENT
Start: 2018-07-10 | End: 2019-04-16 | Stop reason: SDUPTHER

## 2018-07-10 ASSESSMENT — ENCOUNTER SYMPTOMS
BACK PAIN: 0
COUGH: 0
NAUSEA: 0
CHEST TIGHTNESS: 0
ABDOMINAL PAIN: 0
VOICE CHANGE: 0
SORE THROAT: 0
VISUAL CHANGE: 0
VOMITING: 0
WHEEZING: 0
CONSTIPATION: 0
DIARRHEA: 0
RHINORRHEA: 0
SINUS PRESSURE: 0
SHORTNESS OF BREATH: 1
TROUBLE SWALLOWING: 0

## 2018-07-10 ASSESSMENT — PATIENT HEALTH QUESTIONNAIRE - PHQ9
SUM OF ALL RESPONSES TO PHQ QUESTIONS 1-9: 0
2. FEELING DOWN, DEPRESSED OR HOPELESS: 0
1. LITTLE INTEREST OR PLEASURE IN DOING THINGS: 0
SUM OF ALL RESPONSES TO PHQ9 QUESTIONS 1 & 2: 0

## 2018-07-10 NOTE — PROGRESS NOTES
Visit Date: 7/10/2018    Mahesh Salguero is a 80 y.o. male who presents today for:  Chief Complaint   Patient presents with    Diabetes    Hypertension         HPI:        His Cardiologist Dr Kaveh Fry is left [facility], Patient's was to be referred to a new cardiologist    doing well, No SOB, No chest pain , No fatigue. No nausea nor abdominal pain      Diabetes   He presents for his follow-up diabetic visit. He has type 1 diabetes mellitus. His disease course has been stable. Pertinent negatives for hypoglycemia include no dizziness or headaches. Pertinent negatives for diabetes include no chest pain, no fatigue, no foot paresthesias, no visual change, no weakness and no weight loss. Symptoms are stable. Hyperlipidemia   Associated symptoms include shortness of breath. Pertinent negatives include no chest pain or myalgias. Hypertension   Associated symptoms include shortness of breath. Pertinent negatives include no chest pain, headaches, neck pain or palpitations. Current Medications:  Current Outpatient Prescriptions   Medication Sig Dispense Refill    atorvastatin (LIPITOR) 40 MG tablet Take 1 tablet by mouth daily 90 tablet 3    fluticasone (FLONASE) 50 MCG/ACT nasal spray USE 2 SPRAYS IN EACH NOSTRIL TWICE A DAY 1 Bottle 2    ammonium lactate (AMLACTIN) 12 % cream APPLY TO AFFECTED AREA 2 TIMES DAILY  11    midodrine (PROAMATINE) 10 MG tablet Take 1 tablet by mouth 3 times daily (with meals) 270 tablet 1    isosorbide mononitrate (IMDUR) 30 MG extended release tablet Take 1 tablet by mouth daily 90 tablet 1    B Complex-C-Folic Acid (TRIPHROCAPS) 1 MG CAPS Take 1 capsule by mouth daily 90 capsule 1    fludrocortisone (FLORINEF) 0.1 MG tablet Take 1 tablet by mouth daily 90 tablet 1    tamsulosin (FLOMAX) 0.4 MG capsule Take 1 capsule by mouth daily 90 capsule 1    warfarin (COUMADIN) 3 MG tablet [facility]  180 tablets for 90 days 180 tablet 3    ONETOUCH VERIO strip 1 each by In Vitro route 3 times daily Dx E11.9 300 each 3    metoprolol tartrate (LOPRESSOR) 25 MG tablet Take 0.5 tablets by mouth Daily 60 tablet 5    insulin glargine (LANTUS SOLOSTAR) 100 UNIT/ML injection pen 50 units at night 15 pen 1    Ferric Citrate (AURYXIA) 1  MG(Fe) TABS Take 1 tablet by mouth 3 times daily 270 tablet 1    NOVOLOG FLEXPEN 100 UNIT/ML injection pen Per sliding scale Dx E11.9 5 pen 5    aspirin 81 MG chewable tablet Take 1 tablet by mouth daily 100 tablet 1    timolol (TIMOPTIC) 0.5 % ophthalmic solution Place 1 drop into both eyes 2 times daily Indications: Disease of the Eye 15 mL 1    OXYGEN by Nasal route See Admin Instructions Indications: Oxygen Therapy      warfarin (COUMADIN) 2.5 MG tablet Indications: Anticoagulant Therapy, Atrial Fibrillation, Blockage of Blood Vessel to Lung by a Particle Take as directed by [facility].  acetaminophen (TYLENOL) 325 MG tablet Take 2 tablets by mouth every 4 hours as needed for Fever 120 tablet 3     No current facility-administered medications for this visit. Subjective:      Review of Systems   Constitutional: Negative for appetite change, chills, diaphoresis, fatigue, fever and weight loss. HENT: Negative for congestion, ear discharge, ear pain, postnasal drip, rhinorrhea, sinus pressure, sore throat, trouble swallowing and voice change. Respiratory: Positive for shortness of breath. Negative for cough, chest tightness and wheezing. Cardiovascular: Negative for chest pain, palpitations and leg swelling. Gastrointestinal: Negative for abdominal pain, constipation, diarrhea, nausea and vomiting. Musculoskeletal: Negative for arthralgias, back pain, joint swelling, myalgias, neck pain and neck stiffness. Skin: Negative for rash. Neurological: Negative for dizziness, syncope, weakness, light-headedness, numbness and headaches.        Objective:     /64 (Site: Right Arm,

## 2018-07-11 ENCOUNTER — HOSPITAL ENCOUNTER (OUTPATIENT)
Dept: PHARMACY | Age: 83
Setting detail: THERAPIES SERIES
Discharge: HOME | End: 2018-07-11
Payer: MEDICARE

## 2018-07-11 ENCOUNTER — TELEPHONE (OUTPATIENT)
Dept: CARDIOLOGY CLINIC | Age: 83
End: 2018-07-11

## 2018-07-11 DIAGNOSIS — I26.99 OTHER PULMONARY EMBOLISM WITHOUT ACUTE COR PULMONALE, UNSPECIFIED CHRONICITY (HCC): ICD-10-CM

## 2018-07-11 DIAGNOSIS — I48.0 PAROXYSMAL ATRIAL FIBRILLATION (HCC): ICD-10-CM

## 2018-07-11 LAB — POC INR: 3 (ref 0.8–1.2)

## 2018-07-11 PROCEDURE — 36416 COLLJ CAPILLARY BLOOD SPEC: CPT

## 2018-07-11 PROCEDURE — 85610 PROTHROMBIN TIME: CPT

## 2018-07-11 PROCEDURE — 99211 OFF/OP EST MAY X REQ PHY/QHP: CPT

## 2018-07-11 RX ORDER — MIDODRINE HYDROCHLORIDE 5 MG/1
5 TABLET ORAL 3 TIMES DAILY
COMMUNITY
End: 2018-07-18 | Stop reason: SDUPTHER

## 2018-07-11 NOTE — TELEPHONE ENCOUNTER
Former patient of Dr. Julissa Perez, not seen by Dr. Shay Rosas yet.      Pre op Risk Assessment    Procedure tooth extraction  Physician   Date of surgery/procedure TBD    Last OV 4/4/2018 dr. Julissa Perez  Last Stress Nothing in EPIC  Last Echo 9/21/2016  Last Cath Nothing in EPIC  Last Stent Nothing in EPIC  Is patient on blood thinners Coumadin  Hold Meds/how many days

## 2018-07-11 NOTE — PROGRESS NOTES
Medication Management [facility]  Anticoagulation Clinic  [phone] (phone)  [phone] (fax)      Mr. Bishop Archer is a 80 y.o.  male with history of PE/paroxysmal atrial fib. , who presents today for Warfarin monitoring and adjustment (5 week visit). Wife verifies current dosing regimen and tablet strength. No missed or extra doses. Patient denies bleeding/bruising/chest pain. Has usual COOK and bilateral leg swelling. States stool is normally dark from iron - dialysis nurse recently sent stool sample to check for blood. No blood in urine or stool. No dietary changes. Changes in medication/OTC agents/herbals: wife states now also has 5 mg Midodrine to take with 10 mg tablet to equal 15 mg TID. No change in alcohol use or tobacco use. Change in activity level: decreased. Patient denies headaches/dizziness/lightheadedness/falls. No vomiting/diarrhea or acute illness. No procedures scheduled in the future at this time. Went to dentist recently, but wouldn't pull one tooth because of Warfarin. Noted entry in computer today asking cardiologist for direction (apparently PCP contacted him, patient states). Assessment:   Lab Results   Component Value Date    INR 3.00 (H) 07/11/2018    INR 2.70 (H) 06/08/2018    INR 2.10 (H) 05/04/2018     INR therapeutic - goal 2-3. Recent Labs      07/11/18   1111   INR  3.00*         Plan:  POCT INR ordered/performed/result reviewed. Continue PO Coumadin 9 mg MF, 6 mg TWThSS. Recheck INR in 6 weeks (unless needs to hold Coumadin for procedure - will await cardiologist's instructions). Patient reminded to call the Anticoagulation Clinic with any signs or symptoms of bleeding or with any medication changes. Patient given instructions utilizing the teach back method. Discharged via transport chair in no apparent distress, with wife. After visit summary printed and reviewed with patient.       Medications reviewed and updated on home medication list.    Influenza vaccine:     [] given    [x] declined   [x] received previously   [] plans to receive at a later time   [] refused    [x] documented in EPIC

## 2018-07-18 ENCOUNTER — OFFICE VISIT (OUTPATIENT)
Dept: CARDIOLOGY CLINIC | Age: 83
End: 2018-07-18
Payer: MEDICARE

## 2018-07-18 VITALS
WEIGHT: 315 LBS | DIASTOLIC BLOOD PRESSURE: 70 MMHG | BODY MASS INDEX: 45.1 KG/M2 | HEIGHT: 70 IN | HEART RATE: 80 BPM | SYSTOLIC BLOOD PRESSURE: 117 MMHG

## 2018-07-18 DIAGNOSIS — I25.10 CORONARY ARTERY DISEASE INVOLVING NATIVE CORONARY ARTERY OF NATIVE HEART WITHOUT ANGINA PECTORIS: Primary | ICD-10-CM

## 2018-07-18 DIAGNOSIS — I48.20 CHRONIC ATRIAL FIBRILLATION (HCC): ICD-10-CM

## 2018-07-18 DIAGNOSIS — Z95.2 S/P AVR: ICD-10-CM

## 2018-07-18 DIAGNOSIS — I50.42 CHRONIC COMBINED SYSTOLIC AND DIASTOLIC HEART FAILURE (HCC): ICD-10-CM

## 2018-07-18 DIAGNOSIS — Z95.0 PACEMAKER: ICD-10-CM

## 2018-07-18 DIAGNOSIS — R06.09 DYSPNEA ON EXERTION: ICD-10-CM

## 2018-07-18 DIAGNOSIS — I10 ESSENTIAL HYPERTENSION: ICD-10-CM

## 2018-07-18 PROCEDURE — G8427 DOCREV CUR MEDS BY ELIG CLIN: HCPCS | Performed by: NUCLEAR MEDICINE

## 2018-07-18 PROCEDURE — 1036F TOBACCO NON-USER: CPT | Performed by: NUCLEAR MEDICINE

## 2018-07-18 PROCEDURE — 99214 OFFICE O/P EST MOD 30 MIN: CPT | Performed by: NUCLEAR MEDICINE

## 2018-07-18 PROCEDURE — G8598 ASA/ANTIPLAT THER USED: HCPCS | Performed by: NUCLEAR MEDICINE

## 2018-07-18 PROCEDURE — 1101F PT FALLS ASSESS-DOCD LE1/YR: CPT | Performed by: NUCLEAR MEDICINE

## 2018-07-18 PROCEDURE — 93000 ELECTROCARDIOGRAM COMPLETE: CPT | Performed by: NUCLEAR MEDICINE

## 2018-07-18 PROCEDURE — 4040F PNEUMOC VAC/ADMIN/RCVD: CPT | Performed by: NUCLEAR MEDICINE

## 2018-07-18 PROCEDURE — 1123F ACP DISCUSS/DSCN MKR DOCD: CPT | Performed by: NUCLEAR MEDICINE

## 2018-07-18 PROCEDURE — G8417 CALC BMI ABV UP PARAM F/U: HCPCS | Performed by: NUCLEAR MEDICINE

## 2018-07-18 NOTE — PROGRESS NOTES
Pt here for clearance for dental extraction with Dr. Bettina Quinones asking to hold coumadin for 5 days  Denies chest pain, dizziness   States he gets SOB

## 2018-07-18 NOTE — PROGRESS NOTES
[facility]  [facility]  [address]  [address] [facility] [address]  Dept: [phone]  Dept Fax: [phone]  Loc: [phone]    Visit Date: 7/18/2018    Deonte Cordova is a 80 y.o. male who presents today for:  Chief Complaint   Patient presents with    Check-Up    Hypertension    Cardiac Valve Problem    Hyperlipidemia    Atrial Fibrillation   here for first time for me  Had complicated history   Known AVR 2010  Known pacer in 2009   Underlying A fib   No known major CAD  Now going for teeth extraction   Here for clearance  No chest pain  Limited by weight   Does have dyspnea on exertion   On home O2  Does have sleep apnea   He is also a dialysis patient  Since 2016   Bp is stable         HPI:  HPI  Past Medical History:   Diagnosis Date    Acute on chronic combined systolic and diastolic congestive heart failure ([address]     Aortic stenosis     Atrial fibrillation ([address] 1/25/2017    Atrial flutter ([address] 1/25/2017    COPD (chronic obstructive pulmonary disease) ([address]     Coronary artery disease     DM (diabetes mellitus), type 2 with renal complications ([address]     DVT (deep venous thrombosis) ([address]     Hemodialysis patient ([address] 10/22/2016    with [facility]    Hyperlipidemia     Hypertension     Morbid obesity with BMI of 50.0-59.9, adult ([address]     Obstructive sleep apnea     On home oxygen therapy 2013    Osteoarthritis     Pacemaker     [employer] [place] dual pacer    Prostate enlargement       Past Surgical History:   Procedure Laterality Date    AORTIC VALVE REPLACEMENT  2/3/2010    tissue valve    CARDIAC CATHETERIZATION  1 20 2010    Severe AS. Mild MV stenosis. Normal coronary arteries. Normal LV systolic function. EF 70%. Moderate concentric LVH. SPAP 47/20.  CARDIAC CATHETERIZATION  8 12 2009    Transvenous dual chamber permanent pacemaer implantation.  CARDIAC CATHETERIZATION  11 16 2007    Normal coronary arterties.  EF  atorvastatin (LIPITOR) 40 MG tablet Take 1 tablet by mouth daily 90 tablet 3    fluticasone (FLONASE) 50 MCG/ACT nasal spray USE 2 SPRAYS IN EACH NOSTRIL TWICE A DAY 1 Bottle 2    ammonium lactate (AMLACTIN) 12 % cream APPLY TO AFFECTED AREA 2 TIMES DAILY  11    midodrine (PROAMATINE) 10 MG tablet Take 1 tablet by mouth 3 times daily (with meals) 270 tablet 1    isosorbide mononitrate (IMDUR) 30 MG extended release tablet Take 1 tablet by mouth daily 90 tablet 1    B Complex-C-Folic Acid (TRIPHROCAPS) 1 MG CAPS Take 1 capsule by mouth daily 90 capsule 1    fludrocortisone (FLORINEF) 0.1 MG tablet Take 1 tablet by mouth daily 90 tablet 1    tamsulosin (FLOMAX) 0.4 MG capsule Take 1 capsule by mouth daily 90 capsule 1    warfarin (COUMADIN) 3 MG tablet Take as directed by [facility]. 180 tablets for 90 days 180 tablet 3    ONETOUCH VERIO strip 1 each by In Vitro route 3 times daily Dx E11.9 300 each 3    metoprolol tartrate (LOPRESSOR) 25 MG tablet Take 0.5 tablets by mouth Daily 60 tablet 5    insulin glargine (LANTUS SOLOSTAR) 100 UNIT/ML injection pen 50 units at night 15 pen 1    Ferric Citrate (AURYXIA) 1  MG(Fe) TABS Take 1 tablet by mouth 3 times daily 270 tablet 1    NOVOLOG FLEXPEN 100 UNIT/ML injection pen Per sliding scale Dx E11.9 5 pen 5    aspirin 81 MG chewable tablet Take 1 tablet by mouth daily 100 tablet 1    timolol (TIMOPTIC) 0.5 % ophthalmic solution Place 1 drop into both eyes 2 times daily Indications: Disease of the Eye 15 mL 1    OXYGEN by Nasal route See Admin Instructions Indications: Oxygen Therapy      warfarin (COUMADIN) 2.5 MG tablet Indications: Anticoagulant Therapy, Atrial Fibrillation, Blockage of Blood Vessel to Lung by a Particle Take as directed by [facility].        acetaminophen (TYLENOL) 325 MG tablet Take 2 tablets by mouth every 4 hours as needed for Fever 120 tablet 3     No current facility-administered medications for this visit. No Known Allergies  Health Maintenance   Topic Date Due    DTaP/Tdap/Td vaccine (1 - Tdap) 07[birth year]/1953    Shingles Vaccine (1 of 2 - 2 Dose Series) 07/28/1984    Diabetic retinal exam  07/28/2009    Flu vaccine (1) 09/01/2018    A1C test (Diabetic or Prediabetic)  01/10/2019    Lipid screen  07/09/2019    Diabetic foot exam  07/10/2019    Potassium monitoring  07/10/2019    Creatinine monitoring  07/10/2019    Pneumococcal high/highest risk  Completed       Subjective:  Review of Systems  General:   No fever, no chills, some fatigue or weight loss  Pulmonary:    some more dyspnea, no wheezing  Cardiac:    Denies recent chest pain,   GI:     No nausea or vomiting, no abdominal pain  Neuro:     No dizziness or light headedness,   Musculoskeletal:  No recent active issues  Extremities:   No edema, good peripheral pulses      Objective:  Physical Exam  /70   Pulse 80   Ht 5' 10\" (1.778 m)   Wt (!) 345 lb (156.5 kg)   BMI 49.50 kg/m²   General:   Well developed, well nourished  Lungs:    Decreased air   Heart:    Normal S1 S2, Slight murmur. no rubs, no gallops  Abdomen:   Soft, non tender, no organomegalies, positive bowel sounds  Extremities:   No edema, no cyanosis, good peripheral pulses  Neurological:   Awake, alert, oriented. No obvious focal deficits  Musculoskelatal:  No obvious deformities    Assessment:      Diagnosis Orders   1. Coronary artery disease involving native coronary artery of native heart without angina pectoris  EKG 12 lead   2. Chronic combined systolic and diastolic heart failure (HCC)  EKG 12 lead   3. Essential hypertension  EKG 12 lead   4. S/P AVR     5. Pacemaker     6. Chronic atrial fibrillation (HCC)     as above  Concerning symptoms   Higher risk patient as above  Going for dental work up   ECG in office was done today. I reviewed the ECG. No acute findings      Plan:  No Follow-up on file.   Discussed at length  Clear for dental work   Will be a

## 2018-08-07 RX ORDER — FLUTICASONE PROPIONATE 50 MCG
SPRAY, SUSPENSION (ML) NASAL
Qty: 1 BOTTLE | Refills: 2 | Status: SHIPPED | OUTPATIENT
Start: 2018-08-07 | End: 2018-08-24 | Stop reason: SDUPTHER

## 2018-08-16 LAB
ALBUMIN SERPL-MCNC: NORMAL G/DL
ALP BLD-CCNC: 222 U/L
ALT SERPL-CCNC: NORMAL U/L
ANION GAP SERPL CALCULATED.3IONS-SCNC: NORMAL MMOL/L
AST SERPL-CCNC: NORMAL U/L
AVERAGE GLUCOSE: NORMAL
BILIRUB SERPL-MCNC: NORMAL MG/DL (ref 0.1–1.4)
BUN BLDV-MCNC: NORMAL MG/DL
CALCIUM SERPL-MCNC: NORMAL MG/DL
CHLORIDE BLD-SCNC: NORMAL MMOL/L
CO2: NORMAL MMOL/L
CREAT SERPL-MCNC: NORMAL MG/DL
FERRITIN: 1613 NG/ML (ref 18–300)
GFR CALCULATED: NORMAL
GLUCOSE BLD-MCNC: NORMAL MG/DL
HBA1C MFR BLD: 7.9 %
MAGNESIUM: 1.9 MG/DL
POTASSIUM SERPL-SCNC: NORMAL MMOL/L
PTH INTACT: 304
SODIUM BLD-SCNC: NORMAL MMOL/L
TOTAL PROTEIN: NORMAL
VITAMIN D 25-HYDROXY: 33.9
VITAMIN D2, 25 HYDROXY: NORMAL
VITAMIN D3,25 HYDROXY: NORMAL

## 2018-08-22 ENCOUNTER — HOSPITAL ENCOUNTER (OUTPATIENT)
Dept: NON INVASIVE DIAGNOSTICS | Age: 83
Discharge: HOME | End: 2018-08-22
Payer: MEDICARE

## 2018-08-22 ENCOUNTER — NURSE ONLY (OUTPATIENT)
Dept: CARDIOLOGY CLINIC | Age: 83
End: 2018-08-22
Payer: MEDICARE

## 2018-08-22 ENCOUNTER — TELEPHONE (OUTPATIENT)
Dept: PHARMACY | Age: 83
End: 2018-08-22

## 2018-08-22 ENCOUNTER — HOSPITAL ENCOUNTER (OUTPATIENT)
Dept: PHARMACY | Age: 83
Setting detail: THERAPIES SERIES
Discharge: HOME | End: 2018-08-22
Payer: MEDICARE

## 2018-08-22 VITALS — HEIGHT: 70 IN | BODY MASS INDEX: 45.1 KG/M2 | WEIGHT: 315 LBS

## 2018-08-22 DIAGNOSIS — I25.10 CORONARY ARTERY DISEASE INVOLVING NATIVE CORONARY ARTERY OF NATIVE HEART WITHOUT ANGINA PECTORIS: ICD-10-CM

## 2018-08-22 DIAGNOSIS — Z95.2 S/P AVR: ICD-10-CM

## 2018-08-22 DIAGNOSIS — Z95.0 PACEMAKER: Primary | ICD-10-CM

## 2018-08-22 DIAGNOSIS — Z79.01 LONG TERM CURRENT USE OF ANTICOAGULANT THERAPY: Primary | ICD-10-CM

## 2018-08-22 DIAGNOSIS — R06.09 DYSPNEA ON EXERTION: ICD-10-CM

## 2018-08-22 DIAGNOSIS — I48.0 PAROXYSMAL ATRIAL FIBRILLATION (HCC): ICD-10-CM

## 2018-08-22 DIAGNOSIS — I26.99 OTHER PULMONARY EMBOLISM WITHOUT ACUTE COR PULMONALE, UNSPECIFIED CHRONICITY (HCC): ICD-10-CM

## 2018-08-22 DIAGNOSIS — Z95.0 PACEMAKER: ICD-10-CM

## 2018-08-22 LAB
LV EF: 50 %
LVEF MODALITY: NORMAL
POC INR: 3.3 (ref 0.8–1.2)

## 2018-08-22 PROCEDURE — 93288 INTERROG EVL PM/LDLS PM IP: CPT | Performed by: INTERNAL MEDICINE

## 2018-08-22 PROCEDURE — 2709999900 HC NON-CHARGEABLE SUPPLY

## 2018-08-22 PROCEDURE — 6360000002 HC RX W HCPCS

## 2018-08-22 PROCEDURE — 85610 PROTHROMBIN TIME: CPT

## 2018-08-22 PROCEDURE — 36416 COLLJ CAPILLARY BLOOD SPEC: CPT

## 2018-08-22 PROCEDURE — 93017 CV STRESS TEST TRACING ONLY: CPT | Performed by: NUCLEAR MEDICINE

## 2018-08-22 PROCEDURE — 99211 OFF/OP EST MAY X REQ PHY/QHP: CPT

## 2018-08-22 PROCEDURE — 93306 TTE W/DOPPLER COMPLETE: CPT

## 2018-08-22 PROCEDURE — A9500 TC99M SESTAMIBI: HCPCS | Performed by: NUCLEAR MEDICINE

## 2018-08-22 PROCEDURE — 78452 HT MUSCLE IMAGE SPECT MULT: CPT | Performed by: NUCLEAR MEDICINE

## 2018-08-22 PROCEDURE — 3430000000 HC RX DIAGNOSTIC RADIOPHARMACEUTICAL: Performed by: NUCLEAR MEDICINE

## 2018-08-22 RX ORDER — WARFARIN SODIUM 3 MG/1
TABLET ORAL
Qty: 180 TABLET | Refills: 3 | Status: SHIPPED | OUTPATIENT
Start: 2018-08-22 | End: 2018-11-13 | Stop reason: SDUPTHER

## 2018-08-22 RX ADMIN — Medication 9 MILLICURIE: at 11:02

## 2018-08-22 RX ADMIN — Medication 32.7 MILLICURIE: at 11:54

## 2018-08-22 NOTE — PROGRESS NOTES
Medication Management [facility]  Anticoagulation Clinic  [phone] (phone)  [phone] (fax)      Mr. Meryle Canary is a 80 y.o.  male with history of PE/paroxysmal atrial fib who presents today for anticoagulation monitoring and adjustment. Patient verifies current dosing regimen and tablet strength. No missed or extra doses. Patient denies s/s bleeding/bruising/swelling/SOB/chest pain -no more SOB than usual   No blood in urine or stool. No dietary changes. No changes in medication/OTC agents/Herbals. No change in alcohol use or tobacco use. No change in activity level. Patient denies headaches/dizziness/lightheadedness/falls. No vomiting/diarrhea or acute illness. No Procedures scheduled in the future at this time.  -colonoscopy 9/7 with Dr. Vladislav Siddiqui. Instructed patient that we contacted MD to manage Lovenox and they will give instructions since he is a dialysis patient. Need to follow up to make sure they give him instructions for warfarin after procedure. Documented on calender. Assessment:   Lab Results   Component Value Date    INR 3.30 (H) 08/22/2018    INR 3.00 (H) 07/11/2018    INR 2.70 (H) 06/08/2018     INR supratherapeutic   Recent Labs      08/22/18   1416   INR  3.30*         Plan:  Coumadin 3 mg today then continue Coumadin 9 mg MF, 6 mg TWThSS. Recheck INR 3 weeks. Patient reminded to call the Anticoagulation Clinic with any signs or symptoms of bleeding or with any medication changes. Patient given instructions utilizing the teach back method. Discharged ambulatory in no apparent distress. Assessment and plan reviewed with Isabelle Richardson, [address] [place] [address]. After visit summary printed and reviewed with patient.       Medications reviewed and updated on home medication list Yes    Influenza vaccine:     [] given    [x] declined   [x] received previously   [x] plans to receive at a later time   [] refused    [x] documented in EPIC

## 2018-08-22 NOTE — PROGRESS NOTES
Battery watch  Baki pt   0-0.25 years on device   RV threshold 0.5 @ 0.4  R waves 4.4-7.4  imped 601    Known at fib   Will see back in 2 months

## 2018-08-22 NOTE — TELEPHONE ENCOUNTER
Pt scheduled for endoscopy per Dr. Sadie Araujo 9/7/18. CHADS2 score > 3. Dialysis pt at [facility]. [facility]'s Outpatient dialysis, managed by Dr. Genaro Mg. Last SCr >9. Contacted Dr. Janki Ramírez office and spoke to Evy Peter. Advised that [facility] anticoagulation clinic does not dose LMWH for dialysis patients and recommended Dr. Can Gunderson contact Dr. Genaro Mg to collaborate regarding violeta-procedural anticoagulation. Asked that the Coumadin Clinic is notified once recommendations are made so that appointmts for INRs can be scheduled appropriately.

## 2018-08-23 ENCOUNTER — TELEPHONE (OUTPATIENT)
Dept: CARDIOLOGY CLINIC | Age: 83
End: 2018-08-23

## 2018-08-23 ENCOUNTER — TELEPHONE (OUTPATIENT)
Dept: PHARMACY | Age: 83
End: 2018-08-23

## 2018-08-23 NOTE — TELEPHONE ENCOUNTER
Pt had stress and echo   [facility]

## 2018-08-23 NOTE — TELEPHONE ENCOUNTER
Dr. Kady Cavanaugh called stating that patient can hold Coumadin for 3 days prior to procedure with no Lovenox bridge. He states as long as INR is less than 1.8 he will do the procedure. Day Coumadin PM     9/4   none       9/5   none       9/6     none     9/7  (procedure)     12 mg     9/8     9 mg     9/9     6 mg     9/10     9 mg     9/11     6 mg     9/12     INR     Calling patient to give him instructions. No answer, unable to leave message. Will continue to attempt to contact patient.

## 2018-08-24 RX ORDER — FLUTICASONE PROPIONATE 50 MCG
SPRAY, SUSPENSION (ML) NASAL
Qty: 1 BOTTLE | Refills: 2 | Status: SHIPPED | OUTPATIENT
Start: 2018-08-24 | End: 2019-07-18 | Stop reason: SDUPTHER

## 2018-08-28 ENCOUNTER — TELEPHONE (OUTPATIENT)
Dept: FAMILY MEDICINE CLINIC | Age: 83
End: 2018-08-28

## 2018-08-28 NOTE — TELEPHONE ENCOUNTER
Amina dropped off [employer] Form to be completed for pt to get fitted for shoes again. Apryl Bravo may be reached at [phone]    Blank Form scanned into Epic and placed on Cart.

## 2018-09-07 ENCOUNTER — HOSPITAL ENCOUNTER (OUTPATIENT)
Age: 83
Setting detail: OUTPATIENT SURGERY
Discharge: HOME | End: 2018-09-07
Attending: INTERNAL MEDICINE | Admitting: INTERNAL MEDICINE
Payer: MEDICARE

## 2018-09-07 ENCOUNTER — ANESTHESIA (OUTPATIENT)
Dept: ENDOSCOPY | Age: 83
End: 2018-09-07
Payer: MEDICARE

## 2018-09-07 ENCOUNTER — ANESTHESIA EVENT (OUTPATIENT)
Dept: ENDOSCOPY | Age: 83
End: 2018-09-07
Payer: MEDICARE

## 2018-09-07 VITALS
RESPIRATION RATE: 16 BRPM | SYSTOLIC BLOOD PRESSURE: 86 MMHG | OXYGEN SATURATION: 100 % | DIASTOLIC BLOOD PRESSURE: 47 MMHG

## 2018-09-07 VITALS
HEART RATE: 81 BPM | WEIGHT: 315 LBS | SYSTOLIC BLOOD PRESSURE: 101 MMHG | RESPIRATION RATE: 20 BRPM | DIASTOLIC BLOOD PRESSURE: 56 MMHG | BODY MASS INDEX: 45.1 KG/M2 | HEIGHT: 70 IN | TEMPERATURE: 97.4 F | OXYGEN SATURATION: 99 %

## 2018-09-07 LAB — GLUCOSE BLD-MCNC: 152 MG/DL (ref 70–108)

## 2018-09-07 PROCEDURE — 2580000003 HC RX 258: Performed by: INTERNAL MEDICINE

## 2018-09-07 PROCEDURE — C1729 CATH, DRAINAGE: HCPCS | Performed by: INTERNAL MEDICINE

## 2018-09-07 PROCEDURE — 7100000000 HC PACU RECOVERY - FIRST 15 MIN: Performed by: INTERNAL MEDICINE

## 2018-09-07 PROCEDURE — 3700000000 HC ANESTHESIA ATTENDED CARE: Performed by: INTERNAL MEDICINE

## 2018-09-07 PROCEDURE — 3609027000 HC COLONOSCOPY: Performed by: INTERNAL MEDICINE

## 2018-09-07 PROCEDURE — 6360000002 HC RX W HCPCS: Performed by: REGISTERED NURSE

## 2018-09-07 PROCEDURE — 2709999900 HC NON-CHARGEABLE SUPPLY: Performed by: INTERNAL MEDICINE

## 2018-09-07 PROCEDURE — 2500000003 HC RX 250 WO HCPCS: Performed by: REGISTERED NURSE

## 2018-09-07 PROCEDURE — 3700000001 HC ADD 15 MINUTES (ANESTHESIA): Performed by: INTERNAL MEDICINE

## 2018-09-07 PROCEDURE — 3609017100 HC EGD: Performed by: INTERNAL MEDICINE

## 2018-09-07 PROCEDURE — 7100000001 HC PACU RECOVERY - ADDTL 15 MIN: Performed by: INTERNAL MEDICINE

## 2018-09-07 PROCEDURE — 82948 REAGENT STRIP/BLOOD GLUCOSE: CPT

## 2018-09-07 PROCEDURE — C1773 RET DEV, INSERTABLE: HCPCS | Performed by: INTERNAL MEDICINE

## 2018-09-07 RX ORDER — SODIUM CHLORIDE 450 MG/100ML
INJECTION, SOLUTION INTRAVENOUS CONTINUOUS
Status: DISCONTINUED | OUTPATIENT
Start: 2018-09-07 | End: 2018-09-07 | Stop reason: HOSPADM

## 2018-09-07 RX ORDER — KETAMINE HYDROCHLORIDE 50 MG/ML
INJECTION, SOLUTION, CONCENTRATE INTRAMUSCULAR; INTRAVENOUS PRN
Status: DISCONTINUED | OUTPATIENT
Start: 2018-09-07 | End: 2018-09-07 | Stop reason: SDUPTHER

## 2018-09-07 RX ORDER — GLYCOPYRROLATE 1 MG/5 ML
SYRINGE (ML) INTRAVENOUS PRN
Status: DISCONTINUED | OUTPATIENT
Start: 2018-09-07 | End: 2018-09-07 | Stop reason: SDUPTHER

## 2018-09-07 RX ORDER — FENTANYL CITRATE 50 UG/ML
INJECTION, SOLUTION INTRAMUSCULAR; INTRAVENOUS PRN
Status: DISCONTINUED | OUTPATIENT
Start: 2018-09-07 | End: 2018-09-07 | Stop reason: SDUPTHER

## 2018-09-07 RX ORDER — LIDOCAINE HYDROCHLORIDE 20 MG/ML
INJECTION, SOLUTION INFILTRATION; PERINEURAL PRN
Status: DISCONTINUED | OUTPATIENT
Start: 2018-09-07 | End: 2018-09-07 | Stop reason: SDUPTHER

## 2018-09-07 RX ORDER — PROPOFOL 10 MG/ML
INJECTION, EMULSION INTRAVENOUS PRN
Status: DISCONTINUED | OUTPATIENT
Start: 2018-09-07 | End: 2018-09-07 | Stop reason: SDUPTHER

## 2018-09-07 RX ADMIN — PROPOFOL 120 MG: 10 INJECTION, EMULSION INTRAVENOUS at 08:16

## 2018-09-07 RX ADMIN — FENTANYL CITRATE 50 MCG: 50 INJECTION INTRAMUSCULAR; INTRAVENOUS at 08:16

## 2018-09-07 RX ADMIN — KETAMINE HYDROCHLORIDE 20 MG: 50 INJECTION, SOLUTION INTRAMUSCULAR; INTRAVENOUS at 08:16

## 2018-09-07 RX ADMIN — SODIUM CHLORIDE: 4.5 INJECTION, SOLUTION INTRAVENOUS at 07:51

## 2018-09-07 RX ADMIN — Medication 0.2 MG: at 08:13

## 2018-09-07 RX ADMIN — LIDOCAINE HYDROCHLORIDE 200 MG: 20 INJECTION, SOLUTION INFILTRATION; PERINEURAL at 08:16

## 2018-09-07 RX ADMIN — FENTANYL CITRATE 50 MCG: 50 INJECTION INTRAMUSCULAR; INTRAVENOUS at 08:21

## 2018-09-07 ASSESSMENT — PAIN - FUNCTIONAL ASSESSMENT: PAIN_FUNCTIONAL_ASSESSMENT: 0-10

## 2018-09-07 ASSESSMENT — PAIN SCALES - GENERAL
PAINLEVEL_OUTOF10: 0
PAINLEVEL_OUTOF10: 0

## 2018-09-07 ASSESSMENT — COPD QUESTIONNAIRES: CAT_SEVERITY: SEVERE

## 2018-09-07 NOTE — H&P
[address] [place] [address]                         PREOPERATIVE HISTORY AND PHYSICAL    PATIENT NAME: Beth Hobson                       :        1934  MED REC NO:   [id number]                           ROOM:  ACCOUNT NO:   [de-identified]                           ADMIT DATE: 2018  PROVIDER:     Wally Soja, M.D. Alphonsa Nissen OF PROCEDURE:  2018    PROCEDURE:  Esophagogastroduodenoscopy and colonoscopy. INDICATION:  An 51-year-old pleasant male for further evaluation of  occult-positive stools. Stools are black. Currently taking Auryxia, and  the patient also has anemia. Hemoglobin was 8.3. Last EGD and colonoscopy  by me in  showed EGD and colonoscopy were fair prep. The patient also  complains of feeling weak and tired. Because of worsening of anemia and  Hemoccult-positive stools, he is undergoing further evaluation. PAST MEDICAL HISTORY:  Congestive heart failure, aortic stenosis, atrial  fibrillation, atrial flutter, chronic obstructive pulmonary disease,  coronary artery disease, diabetes mellitus, deep venous thrombosis,  hemodialysis, hyperlipidemia, hypertension, obesity, obstructive sleep  apnea, pacemaker, prostate enlargement. PAST SURGICAL HISTORY:  Aortic valve replacement, dialysis fistula  creation, eye surgery, pacemaker placement, tonsillectomy. MEDICATIONS:  Reviewed. PHYSICAL EXAMINATION:  VITAL SIGNS:  Temperature 97.9, pulse 80, respiratory rate 16, blood  pressure 113/57. HEART:  Irregularly irregular. No S3.  LUNGS:  Equal to expansion on inspection. [address] air entry bilaterally. ABDOMEN:  Soft, normoactive bowel sounds. Nontender, nondistended. IMPRESSION:  An 51-year-old pleasant male who has personal history of  Hemoccult-positive stools, anemia. He is undergoing further evaluation. RECOMMENDATIONS:  Keep the patient nothing by mouth.   Proceed with EGD and  colonoscopy as planned. The risks including but not limited to  perforation, bleeding, infection, adverse reaction to medicine, very slight  chance of missing significant lesions. The patient expressed his  understanding. Further plans pending the above test results.         Carmel Mann M.D.    D: 09/07/2018 8:07:22       T: 09/07/2018 8:09:43     [email or web address]  Job#: [id number]     Doc#: [id number]    CC:

## 2018-09-07 NOTE — PROCEDURES
[address] [place] [address]                                  PROCEDURE NOTE    PATIENT NAME: Son Pagan                       :        1934  MED REC NO:   [id number]                           ROOM:  ACCOUNT NO:   [de-identified]                           ADMIT DATE: 2018  PROVIDER:     DOUG Neumannds OF PROCEDURE:  2018    PROCEDURE:  Esophagogastroduodenoscopy and colonoscopy. INDICATION:  The patient is an 80-year-old pleasant male who underwent  colonoscopy by me in , fair prep. Now, the patient has hemoglobin  dropped down to 8.2 and the patient had Hemoccult positive stools. He is  undergoing further evaluation. Please see my brief history for details and  for physical examination. ASA CLASSIFICATION:  As per Anesthesia. Please see Anesthesia note for  details. INSTRUMENT:  PCF-H190AL pediatric colonoscope. PHOTOGRAPHS:  Yes. BIOPSIES: No.    Procedure, indications and complications including but not limited to  perforation, bleeding, infection, adverse reaction to medicine, very slight  chance of missing significant lesions discussed with the patient, and the  patient expressed his understanding and a written consent was obtained. The patient was placed in the left lateral decubitus position in the endo  room #13. The patient was placed on appropriate monitoring of vitals  including blood pressure, heart rate and pulse ox. ESOPHAGOGASTRODUODENOSCOPY REPORT:  Oropharynx was sprayed with Cetacaine  spray and bite-block was placed between maxilla and mandible. After the  adequate total intravenous anesthesia was given by Anesthesia, the  PCF-H190AL pediatric colonoscope was inserted into the oropharynx and the  esophagus was intubated under direct vision. The scope was advanced down  through the stomach into second portion of duodenum.   On careful inspection  and on withdrawal of the scope, the duodenum looks normal as shown in  picture number A3 and A4. Antrum of the stomach looks normal as shown in  picture number A5. Body of the stomach looks normal as shown in picture  number A6. On retroflex, fundus looks normal as shown in picture number  A7. Distal and proximal esophagus looks normal as shown in picture number  A1, A2 and A8. Air was withdrawn as the scope was removed. The patient  tolerated the procedure well without any immediate complications. COLONOSCOPY REPORT:  The patient's position was changed after the rectal  examination, continued on total intravenous anesthesia. The PCF-H190AL  pediatric colonoscope was inserted into the rectum and advanced up to the  cecum without any difficulty. On careful inspection, the preparation was  fair. On withdrawal of the scope, the visualized cecum looks normal as  shown in picture number A1. Ileocecal valve and ascending colon looks  normal as shown in picture number A2 and A3. Transverse colon looks normal  as shown in picture number A4. Descending colon looks normal as shown in  picture number A5. Sigmoid colon looks normal as shown in picture number  A6 and A7. On retroflex, moderate internal hemorrhoids noted as shown in  picture number A8. Air was withdrawn as the scope was removed. The  patient tolerated the procedure well without any immediate complications. Vitals including blood pressure, heart rate and pulse ox were stable during  the procedure and after the procedure. The patient transferred to the  recovery room in stable condition. IMPRESSION:  Esophagogastroduodenoscopy to second portion of duodenum,  normal esophagogastroduodenoscopy. Colonoscopy to cecum, entire colon was  normal.  Small internal hemorrhoids. No evidence of any GI bleeding noted. Most likely anemia due to chronic kidney disease. RECOMMENDATIONS:  Continue Aranesp with the kidney dialysis, iron  supplements.   No further GI workup is needed. Thank you Dr. Magaly Chowdary for letting me to do procedure on the patient. Do  not hesitate to call me if you have any questions. My recommendations are  above. Neymar Dumont M.D.    D: 09/07/2018 8:48:51       T: 09/07/2018 8:52:22     [email or web address]  Job#: [id number]     Doc#: [id number]    CC: Ladonna Peres. DOUG Overton M.D.

## 2018-09-07 NOTE — BRIEF OP NOTE
Brief Postoperative Note  ______________________________________________________________    Patient: Eleazar Galdamez  YOB: 1934  MRN: [id number]  Date of Procedure: 9/7/2018    Pre-Op Diagnosis: ANEMIA, BLOOD IN STOOL    Post-Op Diagnosis: Same       Procedure(s):  COLONOSCOPY  EGD    Anesthesia: Anesthesia type not filed in the log.     Surgeon(s):  Pooja Brown MD    Staff:  Scrub Person First: Augusto [employer]     Estimated Blood Loss: * No values recorded between 9/7/2018  8:10 AM and 9/7/2018  1:91 AM * mL    Complications: None    Specimens:   * No specimens in log *    Implants:  * No implants in log *      Drains:      Findings: nl egd/ colon    Pooja Brown MD  Date: 9/7/2018  Time: 8:44 AM

## 2018-09-07 NOTE — PROGRESS NOTES
Patient admitted to [facility] for EGD and Colonoscopy. Consent form signed.

## 2018-09-07 NOTE — PROGRESS NOTES
Awake and talking. Denies pain or discomfort. Dr Mary Alston in to speak with pt and family regarding findings and plan of care.

## 2018-09-07 NOTE — ANESTHESIA PRE PROCEDURE
Department of Anesthesiology  Preprocedure Note       Name:  Eve Johnston   Age:  80 y.o.  :  1934                                          MRN:  [id number]         Date:  2018      Surgeon: Abi Swain):  Ismael Perera MD    Procedure: Procedure(s):  COLONOSCOPY  EGD    Medications prior to admission:   Prior to Admission medications    Medication Sig Start Date End Date Taking? Authorizing Provider   fluticasone (FLONASE) 50 MCG/ACT nasal spray USE 2 SPRAYS IN EACH NOSTRIL TWICE A DAY 18  Yes Andrés Ledezma MD   warfarin (COUMADIN) 3 MG tablet Take as directed by [facility].  180 tablets for 90 days 18  Yes Dipti Nesbitt MD   NOVOLOG FLEXPEN 100 UNIT/ML injection pen PER SLIDING SCALE 18  Yes Ferdinand Witt MD   atorvastatin (LIPITOR) 40 MG tablet Take 1 tablet by mouth daily 7/10/18  Yes Andrés Ledezma MD   ammonium lactate (AMLACTIN) 12 % cream APPLY TO AFFECTED AREA 2 TIMES DAILY 18  Yes Historical Provider, MD   midodrine (PROAMATINE) 10 MG tablet Take 1 tablet by mouth 3 times daily (with meals) 4/10/18  Yes Andrés Ledezma MD   isosorbide mononitrate (IMDUR) 30 MG extended release tablet Take 1 tablet by mouth daily 4/10/18  Yes Andrés Ledezma MD   B Complex-C-Folic Acid (TRIPHROCAPS) 1 MG CAPS Take 1 capsule by mouth daily 4/10/18  Yes Andrés Ledezma MD   fludrocortisone (FLORINEF) 0.1 MG tablet Take 1 tablet by mouth daily 4/10/18  Yes Andrés Ledezma MD   tamsulosin [facility]) 0.4 MG capsule Take 1 capsule by mouth daily 4/10/18  Yes Andrés Ledezma MD   [facility] VERIO strip 1 each by In Vitro route 3 times daily Dx E11.9 18  Yes Andrés Ledezma MD   metoprolol tartrate (LOPRESSOR) 25 MG tablet Take 0.5 tablets by mouth Daily 18  Yes Andrés Ledezma MD   insulin glargine (LANTUS SOLOSTAR) 100 UNIT/ML injection pen 50 units at night 18  Yes Andrés Ledezma MD   Ferric Citrate (AURYXIA) 1  MG(Fe) TABS Take 1 tablet by mouth 3 times daily 1/4/18  Yes Kristen Francis MD   aspirin 81 MG chewable tablet Take 1 tablet by mouth daily 1/4/18  Yes Kristen Francis MD   timolol (TIMOPTIC) 0.5 % ophthalmic solution Place 1 drop into both eyes 2 times daily Indications: Disease of the Eye 10/3/17  Yes Kristen Francis MD   OXYGEN by Nasal route See Admin Instructions Indications: Oxygen Therapy   Yes Historical Provider, MD   warfarin (COUMADIN) 2.5 MG tablet Indications: Anticoagulant Therapy, Atrial Fibrillation, Blockage of Blood Vessel to Lung by a Particle Take as directed by [facility].     Yes Historical Provider, MD   acetaminophen (TYLENOL) 325 MG tablet Take 2 tablets by mouth every 4 hours as needed for Fever 11/15/16   Fortino West MD       Current medications:    Current Facility-Administered Medications   Medication Dose Route Frequency Provider Last Rate Last Dose    0.45 % sodium chloride infusion   Intravenous Continuous Mu Arthur MD 75 mL/hr at 09/07/18 0751         Allergies:  No Known Allergies    Problem List:    Patient Active Problem List   Diagnosis Code    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 Z95.0    KELSEY on CPAP G47.33, Z99.89    Type 2 diabetes mellitus with stage 4 chronic kidney disease, with long-term current use of insulin (HCC) E11.22, N18.4, Z79.4    Heart failure, systolic and diastolic (HCC) Y17.95    Coronary artery disease involving native coronary artery of native heart without angina pectoris I25.10    Anemia, chronic disease D63.8    Secondary hyperparathyroidism of renal origin ([facility] N25.81    Iron deficiency anemia D50.9    Vitamin D deficiency E55.9    Hyperlipidemia E78.5    Lesion of left native kidney N28.9    Essential hypertension I10    Prostate enlargement N40.0    Cholelithiases K80.20    Hyperphosphatemia E83.39    Chronic bronchitis ([facility] J42    Pulmonary embolus (HCC) I26.99    Atrial flutter (HCC) I48.92    Anticoagulated on Coumadin solid food consumption: 09/05/18    BMI:   Wt Readings from Last 3 Encounters:   09/07/18 (!) 345 lb 12.8 oz (156.9 kg)   08/22/18 (!) 340 lb (154.2 kg)   07/18/18 (!) 345 lb (156.5 kg)     Body mass index is 49.62 kg/m².     CBC:   Lab Results   Component Value Date    WBC 6.82 12/21/2017    RBC 3.78 12/21/2017    HGB 11.3 12/21/2017    HCT 35 12/21/2017    MCV 93 12/21/2017    RDW 20.2 04/01/2017     04/01/2017       CMP:   Lab Results   Component Value Date     12/21/2017    K 5.0 12/21/2017    CL 96 12/21/2017    CO2 21 04/01/2017    BUN 28 04/01/2017    CREATININE 10.74 12/21/2017    AGRATIO 0.9 10/03/2016    LABGLOM 9 04/01/2017    GLUCOSE 237 07/10/2018    GLUCOSE 135 11/28/2016    PROT 7.4 04/01/2017    CALCIUM 9.3 12/21/2017    BILITOT 0.4 04/01/2017    ALKPHOS 177 04/01/2017    AST 22 04/01/2017    ALT 19 04/01/2017       POC Tests:   Recent Labs      09/07/18   0738   POCGLU  152*       Coags:   Lab Results   Component Value Date    INR 3.30 08/22/2018    INR 1.9 07/17/2017    INR 2.10 03/10/2017    APTT 64.6 12/30/2016       HCG (If Applicable): No results found for: PREGTESTUR, PREGSERUM, HCG, HCGQUANT     ABGs: No results found for: PHART, PO2ART, RUB8XDB, HQI6HZG, BEART, U1ITLLSP     Type & Screen (If Applicable):  No results found for: [facility]    Anesthesia Evaluation  Patient summary reviewed and Nursing notes reviewed  Airway: Mallampati: III  TM distance: >3 FB   Neck ROM: full  Mouth opening: > = 3 FB Dental:    (+) edentulous      Pulmonary:normal exam    (+) COPD: severe,  sleep apnea: on CPAP,                             Cardiovascular:  Exercise tolerance: poor (<4 METS),   (+) hypertension:, pacemaker: pacemaker, CAD:, dysrhythmias: atrial fibrillation and atrial flutter, CHF: systolic and diastolic,       ECG reviewed      Echocardiogram reviewed                  Neuro/Psych:               GI/Hepatic/Renal:   (+) renal disease: dialysis, bowel prep, morbid obesity Endo/Other:    (+) DiabetesType II DM, using insulin, hyperthyroidism::., .                 Abdominal:   (+) obese,         Vascular:   + PE. Anesthesia Plan      MAC     ASA 4             Anesthetic plan and risks discussed with patient. Plan discussed with CRNA and attending.                   NIC Patino - CRNA   9/7/2018

## 2018-09-07 NOTE — OP NOTE
[facility] Endoscopy    CONSCIOUS SEDATION      Patient: Graham Moeller  : 1934  Acct#: [de-identified]    PLANNED PROCEDURE   [x]EGD  [x]Colonoscopy []Flex Sigmoid  []Other:  Indication: Pain control for GI procedure    Consent: I have discussed with the patient and/or the patient representative the indication, alternatives, and the possible risks and/or complications of the planned procedure and the anesthesia methods. The patient and/or patient representative appear to understand and agree to proceed. Pre-Sedation Documentation and Exam: I have personally completed a history, physical exam & review of systems for this patient (see notes). Airway Assessment: mac    Prior History of Anesthesia Complications: mac    ASA Classification: mac    Sedation/ Anesthesia Plan: mac      SEDATION  Planned agent:[x]Midazolam []Meperidine [x]Sublimaze []Morphine    Monitoring and Safety: The patient will be placed on a cardiac monitor and vital signs, pulse oximetry and level of consciousness will be continuously evaluated throughout the procedure. The patient will be closely monitored until recovery from the medications is complete and the patient has returned to baseline status. Respiratory therapy will be on standby during the procedure. I have reviewed with the patient and/or family the risks, benefits, and alternatives to the procedure.     Vivek Paiz MD, R [address]  2018, 8:06 AM    Post-Sedation Vital Signs: Vital signs were reviewed and were stable after the procedure (see flow sheet for vitals)            Post-Sedation Exam: Lungs: clear           Complications: none     Vivek Paiz MD, CNSP  2018,

## 2018-09-12 ENCOUNTER — TELEPHONE (OUTPATIENT)
Dept: CARDIOLOGY CLINIC | Age: 83
End: 2018-09-12

## 2018-09-12 ENCOUNTER — HOSPITAL ENCOUNTER (OUTPATIENT)
Dept: PHARMACY | Age: 83
Setting detail: THERAPIES SERIES
Discharge: HOME | End: 2018-09-12
Payer: MEDICARE

## 2018-09-12 DIAGNOSIS — I26.99 OTHER PULMONARY EMBOLISM WITHOUT ACUTE COR PULMONALE, UNSPECIFIED CHRONICITY (HCC): ICD-10-CM

## 2018-09-12 DIAGNOSIS — I48.0 PAROXYSMAL ATRIAL FIBRILLATION (HCC): ICD-10-CM

## 2018-09-12 LAB — POC INR: 1.3 (ref 0.8–1.2)

## 2018-09-12 PROCEDURE — 99211 OFF/OP EST MAY X REQ PHY/QHP: CPT

## 2018-09-12 PROCEDURE — 85610 PROTHROMBIN TIME: CPT

## 2018-09-12 PROCEDURE — 36416 COLLJ CAPILLARY BLOOD SPEC: CPT

## 2018-09-12 RX ORDER — MIDODRINE HYDROCHLORIDE 5 MG/1
5 TABLET ORAL 3 TIMES DAILY
COMMUNITY
Start: 2018-08-05 | End: 2019-04-08

## 2018-09-12 NOTE — PROGRESS NOTES
Medication Management [facility]  Anticoagulation Clinic  [phone] (phone)  [phone] (fax)      Mr. Byron Brenner is a 80 y.o.  male with history of PE/paroxysmal atrial fib. , per Dr. Sabino Marley referral, who presents today for Warfarin monitoring and adjustment (3 week visit). Wife verifies current dosing regimen and tablet strength. Was off Coumadin 9/2 thru 9/7 for 9/7 colonoscopy (this clinic's plan was to hold only 3 days prior). Wife states they were told to take 9 mg 9/8, then resume usual dose. Patient denies bleeding/bruising/chest pain. Has usual COOK/bilateral ankle swelling. No blood in urine. Had black/tarry stool before colonoscopy, but none since. No dietary changes. No changes in medication/OTC agents/herbals. No change in alcohol use or tobacco use. No change in activity level. Patient denies headaches/dizziness/lightheadedness/falls. No vomiting/diarrhea or acute illness. No procedures scheduled in the future at this time. Assessment:   Lab Results   Component Value Date    INR 1.30 (H) 09/12/2018    INR 3.30 (H) 08/22/2018    INR 3.00 (H) 07/11/2018     INR subtherapeutic - goal 2-3. Recent Labs      09/12/18   1024   INR  1.30*       Plan:  POCT INR ordered/performed/result reviewed. 12 mg today, W, 9 mg tomorrow, then continue PO Coumadin 9 mg MF, 6 mg TWThSS. Recheck INR in 1-2 weeks. (Report given - orders entered by RENATA Kearns, PharmD.)  Patient reminded to call the Anticoagulation Clinic with any signs or symptoms of bleeding or with any medication changes. Patient given instructions utilizing the teach back method. Discharged via transport chair in no apparent distress, with wife. After visit summary printed and reviewed with patient/wife.       Medications reviewed and updated on home medication list.     Influenza vaccine:     [] given    [x] declined   [x] received previously   [] plans to receive at a later time   []

## 2018-09-19 ENCOUNTER — HOSPITAL ENCOUNTER (OUTPATIENT)
Dept: PHARMACY | Age: 83
Setting detail: THERAPIES SERIES
Discharge: HOME | End: 2018-09-19
Payer: MEDICARE

## 2018-09-19 DIAGNOSIS — I26.99 OTHER PULMONARY EMBOLISM WITHOUT ACUTE COR PULMONALE, UNSPECIFIED CHRONICITY (HCC): ICD-10-CM

## 2018-09-19 DIAGNOSIS — I48.0 PAROXYSMAL ATRIAL FIBRILLATION (HCC): ICD-10-CM

## 2018-09-19 LAB — POC INR: 3.1 (ref 0.8–1.2)

## 2018-09-19 PROCEDURE — 99211 OFF/OP EST MAY X REQ PHY/QHP: CPT

## 2018-09-19 PROCEDURE — 36416 COLLJ CAPILLARY BLOOD SPEC: CPT

## 2018-09-19 PROCEDURE — 85610 PROTHROMBIN TIME: CPT

## 2018-09-19 NOTE — PROGRESS NOTES
Medication Management [facility]  Anticoagulation Clinic  [phone] (phone)  [phone] (fax)      Mr. Virgie Worthington is a 80 y.o.  male with history of Afib, PE who presents today for anticoagulation monitoring and adjustment. Patient verifies current dosing regimen and tablet strength. No missed or extra doses; patient's wife states he followed the instructions for extra doses given to them last week. Patient denies s/s bleeding/bruising/swelling/SOB/chest pain  No blood in urine or stool. No dietary changes. No changes in medication/OTC agents/Herbals. No change in alcohol use or tobacco use. No change in activity level. Patient denies headaches/dizziness/lightheadedness/falls. No vomiting/diarrhea or acute illness. No Procedures scheduled in the future at this time. Assessment:   Lab Results   Component Value Date    INR 3.10 (H) 09/19/2018    INR 1.30 (H) 09/12/2018    INR 3.30 (H) 08/22/2018     INR supratherapeutic   Recent Labs      09/19/18   1134   INR  3.10*     Patient slightly supratherapeutic today after boosting following holding for a procedure. Previously, patient was well controlled on current regimen. Plan:  Coumadin 3 mg x 1 dose today 9/19/18 then Continue Coumadin 9 mg MF and 6 mg [id number]. Recheck INR 2 weeks. Patient reminded to call the Anticoagulation Clinic with any signs or symptoms of bleeding or with any medication changes. Patient given instructions utilizing the teach back method. Discharged ambulatory in no apparent distress. After visit summary printed and reviewed with patient.       Medications reviewed and updated on home medication list Yes    Influenza vaccine:     [] given    [x] declined   [x] received previously   [] plans to receive at a later time   [] refused    [x] documented in [address], PharmD, [facility]  9/19/2018  11:49 AM

## 2018-10-03 ENCOUNTER — HOSPITAL ENCOUNTER (OUTPATIENT)
Dept: PHARMACY | Age: 83
Setting detail: THERAPIES SERIES
Discharge: HOME | End: 2018-10-03
Payer: MEDICARE

## 2018-10-03 DIAGNOSIS — I26.99 OTHER PULMONARY EMBOLISM WITHOUT ACUTE COR PULMONALE, UNSPECIFIED CHRONICITY (HCC): ICD-10-CM

## 2018-10-03 DIAGNOSIS — I48.0 PAROXYSMAL ATRIAL FIBRILLATION (HCC): ICD-10-CM

## 2018-10-03 LAB — POC INR: 3.1 (ref 0.8–1.2)

## 2018-10-03 PROCEDURE — 36416 COLLJ CAPILLARY BLOOD SPEC: CPT

## 2018-10-03 PROCEDURE — 99211 OFF/OP EST MAY X REQ PHY/QHP: CPT

## 2018-10-03 PROCEDURE — 85610 PROTHROMBIN TIME: CPT

## 2018-10-04 LAB
BUN BLDV-MCNC: 19 MG/DL
CALCIUM SERPL-MCNC: NORMAL MG/DL
CHLORIDE BLD-SCNC: NORMAL MMOL/L
CO2: NORMAL MMOL/L
CREAT SERPL-MCNC: NORMAL MG/DL
GFR CALCULATED: NORMAL
GLUCOSE BLD-MCNC: NORMAL MG/DL
POTASSIUM SERPL-SCNC: NORMAL MMOL/L
SODIUM BLD-SCNC: NORMAL MMOL/L

## 2018-10-04 RX ORDER — MIDODRINE HYDROCHLORIDE 10 MG/1
10 TABLET ORAL
Qty: 270 TABLET | Refills: 1 | Status: SHIPPED | OUTPATIENT
Start: 2018-10-04 | End: 2018-10-11 | Stop reason: SDUPTHER

## 2018-10-06 ENCOUNTER — CARE COORDINATION (OUTPATIENT)
Dept: CARE COORDINATION | Age: 83
End: 2018-10-06

## 2018-10-10 ENCOUNTER — NURSE ONLY (OUTPATIENT)
Dept: CARDIOLOGY CLINIC | Age: 83
End: 2018-10-10
Payer: MEDICARE

## 2018-10-10 DIAGNOSIS — Z95.0 PACEMAKER: Primary | ICD-10-CM

## 2018-10-10 PROCEDURE — 93288 INTERROG EVL PM/LDLS PM IP: CPT | Performed by: INTERNAL MEDICINE

## 2018-10-11 ENCOUNTER — OFFICE VISIT (OUTPATIENT)
Dept: FAMILY MEDICINE CLINIC | Age: 83
End: 2018-10-11

## 2018-10-11 VITALS
HEIGHT: 70 IN | RESPIRATION RATE: 14 BRPM | SYSTOLIC BLOOD PRESSURE: 138 MMHG | BODY MASS INDEX: 49.62 KG/M2 | DIASTOLIC BLOOD PRESSURE: 68 MMHG | HEART RATE: 74 BPM

## 2018-10-11 DIAGNOSIS — E78.5 HYPERLIPIDEMIA, UNSPECIFIED HYPERLIPIDEMIA TYPE: ICD-10-CM

## 2018-10-11 DIAGNOSIS — Z79.4 TYPE 2 DIABETES MELLITUS WITH STAGE 4 CHRONIC KIDNEY DISEASE, WITH LONG-TERM CURRENT USE OF INSULIN (HCC): Primary | ICD-10-CM

## 2018-10-11 DIAGNOSIS — I10 ESSENTIAL HYPERTENSION: ICD-10-CM

## 2018-10-11 DIAGNOSIS — E11.22 TYPE 2 DIABETES MELLITUS WITH STAGE 4 CHRONIC KIDNEY DISEASE, WITH LONG-TERM CURRENT USE OF INSULIN (HCC): Primary | ICD-10-CM

## 2018-10-11 DIAGNOSIS — Z99.2 HEMODIALYSIS PATIENT (HCC): ICD-10-CM

## 2018-10-11 DIAGNOSIS — N18.4 TYPE 2 DIABETES MELLITUS WITH STAGE 4 CHRONIC KIDNEY DISEASE, WITH LONG-TERM CURRENT USE OF INSULIN (HCC): Primary | ICD-10-CM

## 2018-10-11 LAB
GLUCOSE BLD-MCNC: 120 MG/DL
HBA1C MFR BLD: 8 %

## 2018-10-11 PROCEDURE — 83036 HEMOGLOBIN GLYCOSYLATED A1C: CPT | Performed by: EMERGENCY MEDICINE

## 2018-10-11 PROCEDURE — 1101F PT FALLS ASSESS-DOCD LE1/YR: CPT | Performed by: EMERGENCY MEDICINE

## 2018-10-11 PROCEDURE — 99213 OFFICE O/P EST LOW 20 MIN: CPT | Performed by: EMERGENCY MEDICINE

## 2018-10-11 PROCEDURE — 82962 GLUCOSE BLOOD TEST: CPT | Performed by: EMERGENCY MEDICINE

## 2018-10-11 RX ORDER — FLUDROCORTISONE ACETATE 0.1 MG/1
0.1 TABLET ORAL DAILY
Qty: 90 TABLET | Refills: 1 | Status: SHIPPED | OUTPATIENT
Start: 2018-10-11 | End: 2019-07-15

## 2018-10-11 RX ORDER — ISOSORBIDE MONONITRATE 30 MG/1
30 TABLET, EXTENDED RELEASE ORAL DAILY
Qty: 90 TABLET | Refills: 1 | Status: SHIPPED | OUTPATIENT
Start: 2018-10-11 | End: 2019-10-07 | Stop reason: CLARIF

## 2018-10-11 RX ORDER — MIDODRINE HYDROCHLORIDE 10 MG/1
10 TABLET ORAL
Qty: 270 TABLET | Refills: 1 | Status: SHIPPED | OUTPATIENT
Start: 2018-10-11 | End: 2019-04-08

## 2018-10-11 ASSESSMENT — ENCOUNTER SYMPTOMS
COUGH: 0
WHEEZING: 0
TROUBLE SWALLOWING: 0
CONSTIPATION: 0
ABDOMINAL PAIN: 0
SORE THROAT: 0
CHEST TIGHTNESS: 0
NAUSEA: 0
VISUAL CHANGE: 0
VOICE CHANGE: 0
DIARRHEA: 0
RHINORRHEA: 0
SINUS PRESSURE: 0
SHORTNESS OF BREATH: 1
VOMITING: 0
BACK PAIN: 0

## 2018-10-18 LAB
ALBUMIN: 3.8
BASOPHILS ABSOLUTE: ABNORMAL /ΜL
BASOPHILS RELATIVE PERCENT: ABNORMAL %
BUN BLDV-MCNC: NORMAL MG/DL
CALCIUM SERPL-MCNC: 8.9 MG/DL
CALCIUM SERPL-MCNC: NORMAL MG/DL
CHLORIDE BLD-SCNC: 97 MMOL/L
CO2: 22 MMOL/L
CREAT SERPL-MCNC: 9.52 MG/DL
EOSINOPHILS ABSOLUTE: ABNORMAL /ΜL
EOSINOPHILS RELATIVE PERCENT: ABNORMAL %
GFR CALCULATED: NORMAL
GLUCOSE BLD-MCNC: NORMAL MG/DL
HCT VFR BLD CALC: 33.8 % (ref 41–53)
HEMOGLOBIN: 10.2 G/DL (ref 13.5–17.5)
IRON: 87
LYMPHOCYTES ABSOLUTE: ABNORMAL /ΜL
LYMPHOCYTES RELATIVE PERCENT: ABNORMAL %
MCH RBC QN AUTO: ABNORMAL PG
MCHC RBC AUTO-ENTMCNC: ABNORMAL G/DL
MCV RBC AUTO: ABNORMAL FL
MONOCYTES ABSOLUTE: ABNORMAL /ΜL
MONOCYTES RELATIVE PERCENT: ABNORMAL %
NEUTROPHILS ABSOLUTE: ABNORMAL /ΜL
NEUTROPHILS RELATIVE PERCENT: ABNORMAL %
PHOSPHORUS: 3.7 MG/DL
PLATELET # BLD: ABNORMAL K/ΜL
PMV BLD AUTO: ABNORMAL FL
POTASSIUM SERPL-SCNC: 4.9 MMOL/L
RBC # BLD: ABNORMAL 10^6/ΜL
RETICULOCYTE ABSOLUTE COUNT: NORMAL
RETICULOCYTE COUNT PCT: NORMAL
SODIUM BLD-SCNC: 136 MMOL/L
WBC # BLD: 6.12 10^3/ML

## 2018-10-24 ENCOUNTER — APPOINTMENT (OUTPATIENT)
Dept: PHARMACY | Age: 83
End: 2018-10-24
Payer: MEDICARE

## 2018-10-31 RX ORDER — MIDODRINE HYDROCHLORIDE 5 MG/1
TABLET ORAL
Qty: 90 TABLET | Refills: 5 | OUTPATIENT
Start: 2018-10-31

## 2018-11-02 ENCOUNTER — HOSPITAL ENCOUNTER (OUTPATIENT)
Dept: PHARMACY | Age: 83
Setting detail: THERAPIES SERIES
Discharge: HOME | End: 2018-11-02
Payer: MEDICARE

## 2018-11-02 VITALS — HEART RATE: 72 BPM | DIASTOLIC BLOOD PRESSURE: 62 MMHG | SYSTOLIC BLOOD PRESSURE: 125 MMHG

## 2018-11-02 DIAGNOSIS — I26.99 OTHER PULMONARY EMBOLISM WITHOUT ACUTE COR PULMONALE, UNSPECIFIED CHRONICITY (HCC): ICD-10-CM

## 2018-11-02 DIAGNOSIS — I48.0 PAROXYSMAL ATRIAL FIBRILLATION (HCC): ICD-10-CM

## 2018-11-02 LAB
HCT VFR BLD CALC: 32.9 % (ref 42–52)
HEMOGLOBIN: 10.2 GM/DL (ref 14–18)
INR BLD: 4.69 (ref 0.85–1.13)

## 2018-11-02 PROCEDURE — 85610 PROTHROMBIN TIME: CPT

## 2018-11-02 PROCEDURE — 99211 OFF/OP EST MAY X REQ PHY/QHP: CPT

## 2018-11-02 PROCEDURE — 85014 HEMATOCRIT: CPT

## 2018-11-02 PROCEDURE — 85018 HEMOGLOBIN: CPT

## 2018-11-02 RX ORDER — SENNOSIDES 8.6 MG
650 CAPSULE ORAL EVERY 4 HOURS
Status: ON HOLD | COMMUNITY
End: 2020-01-01

## 2018-11-06 RX ORDER — INSULIN ASPART 100 [IU]/ML
INJECTION, SOLUTION INTRAVENOUS; SUBCUTANEOUS
Qty: 15 PEN | Refills: 1 | Status: SHIPPED | OUTPATIENT
Start: 2018-11-06 | End: 2019-06-10 | Stop reason: SDUPTHER

## 2018-11-06 RX ORDER — ISOSORBIDE MONONITRATE 30 MG/1
30 TABLET, EXTENDED RELEASE ORAL DAILY
Qty: 90 TABLET | Refills: 1 | Status: SHIPPED | OUTPATIENT
Start: 2018-11-06 | End: 2018-12-05

## 2018-11-07 ENCOUNTER — HOSPITAL ENCOUNTER (OUTPATIENT)
Dept: PHARMACY | Age: 83
Setting detail: THERAPIES SERIES
Discharge: HOME | End: 2018-11-07
Payer: MEDICARE

## 2018-11-07 DIAGNOSIS — I48.0 PAROXYSMAL ATRIAL FIBRILLATION (HCC): ICD-10-CM

## 2018-11-07 DIAGNOSIS — I26.99 OTHER ACUTE PULMONARY EMBOLISM WITHOUT ACUTE COR PULMONALE (HCC): ICD-10-CM

## 2018-11-07 LAB — POC INR: 2.6 (ref 0.8–1.2)

## 2018-11-07 PROCEDURE — 99211 OFF/OP EST MAY X REQ PHY/QHP: CPT | Performed by: PHARMACIST

## 2018-11-07 PROCEDURE — 36416 COLLJ CAPILLARY BLOOD SPEC: CPT | Performed by: PHARMACIST

## 2018-11-07 PROCEDURE — 85610 PROTHROMBIN TIME: CPT | Performed by: PHARMACIST

## 2018-11-13 ENCOUNTER — TELEPHONE (OUTPATIENT)
Dept: PHARMACY | Age: 83
End: 2018-11-13

## 2018-11-13 RX ORDER — WARFARIN SODIUM 3 MG/1
TABLET ORAL
Qty: 200 TABLET | Refills: 3 | Status: SHIPPED | OUTPATIENT
Start: 2018-11-13 | End: 2019-10-22 | Stop reason: SDUPTHER

## 2018-11-13 RX ORDER — WARFARIN SODIUM 3 MG/1
TABLET ORAL
Qty: 200 TABLET | Refills: 3 | Status: SHIPPED | OUTPATIENT
Start: 2018-11-13 | End: 2018-11-13 | Stop reason: SDUPTHER

## 2018-11-20 ENCOUNTER — HOSPITAL ENCOUNTER (OUTPATIENT)
Dept: PHARMACY | Age: 83
Setting detail: THERAPIES SERIES
Discharge: HOME | End: 2018-11-20
Payer: MEDICARE

## 2018-11-20 DIAGNOSIS — I26.99 OTHER ACUTE PULMONARY EMBOLISM WITHOUT ACUTE COR PULMONALE (HCC): ICD-10-CM

## 2018-11-20 DIAGNOSIS — I48.0 PAROXYSMAL ATRIAL FIBRILLATION (HCC): ICD-10-CM

## 2018-11-20 LAB — POC INR: 3.1 (ref 0.8–1.2)

## 2018-11-20 PROCEDURE — 36416 COLLJ CAPILLARY BLOOD SPEC: CPT

## 2018-11-20 PROCEDURE — 85610 PROTHROMBIN TIME: CPT

## 2018-11-20 PROCEDURE — 99211 OFF/OP EST MAY X REQ PHY/QHP: CPT

## 2018-11-28 ENCOUNTER — NURSE ONLY (OUTPATIENT)
Dept: CARDIOLOGY CLINIC | Age: 83
End: 2018-11-28
Payer: MEDICARE

## 2018-11-28 DIAGNOSIS — Z95.0 PACEMAKER: Primary | ICD-10-CM

## 2018-11-28 PROCEDURE — 93280 PM DEVICE PROGR EVAL DUAL: CPT | Performed by: INTERNAL MEDICINE

## 2018-11-29 RX ORDER — FLUDROCORTISONE ACETATE 0.1 MG/1
0.1 TABLET ORAL DAILY
Qty: 90 TABLET | Refills: 1 | Status: SHIPPED | OUTPATIENT
Start: 2018-11-29 | End: 2018-12-05

## 2018-11-29 NOTE — TELEPHONE ENCOUNTER
Date of last visit:  10/11/2018  Date of next visit:  1/15/2019    Requested Prescriptions     Pending Prescriptions Disp Refills    fludrocortisone (FLORINEF) 0.1 MG tablet [Pharmacy Med Name: FLUDROCORTISONE 0.1 MG TABLET] 90 tablet 1     Sig: TAKE 1 TABLET BY MOUTH DAILY

## 2018-12-05 ENCOUNTER — OFFICE VISIT (OUTPATIENT)
Dept: CARDIOLOGY CLINIC | Age: 83
End: 2018-12-05
Payer: MEDICARE

## 2018-12-05 VITALS
WEIGHT: 315 LBS | BODY MASS INDEX: 45.1 KG/M2 | HEIGHT: 70 IN | DIASTOLIC BLOOD PRESSURE: 60 MMHG | SYSTOLIC BLOOD PRESSURE: 108 MMHG | HEART RATE: 76 BPM

## 2018-12-05 DIAGNOSIS — Z45.010 PACEMAKER AT END OF BATTERY LIFE: Primary | ICD-10-CM

## 2018-12-05 PROCEDURE — 4040F PNEUMOC VAC/ADMIN/RCVD: CPT | Performed by: INTERNAL MEDICINE

## 2018-12-05 PROCEDURE — G8484 FLU IMMUNIZE NO ADMIN: HCPCS | Performed by: INTERNAL MEDICINE

## 2018-12-05 PROCEDURE — 99205 OFFICE O/P NEW HI 60 MIN: CPT | Performed by: INTERNAL MEDICINE

## 2018-12-05 PROCEDURE — G8598 ASA/ANTIPLAT THER USED: HCPCS | Performed by: INTERNAL MEDICINE

## 2018-12-05 PROCEDURE — 1101F PT FALLS ASSESS-DOCD LE1/YR: CPT | Performed by: INTERNAL MEDICINE

## 2018-12-05 PROCEDURE — 93000 ELECTROCARDIOGRAM COMPLETE: CPT | Performed by: INTERNAL MEDICINE

## 2018-12-05 PROCEDURE — 1123F ACP DISCUSS/DSCN MKR DOCD: CPT | Performed by: INTERNAL MEDICINE

## 2018-12-05 PROCEDURE — 1036F TOBACCO NON-USER: CPT | Performed by: INTERNAL MEDICINE

## 2018-12-05 PROCEDURE — G8427 DOCREV CUR MEDS BY ELIG CLIN: HCPCS | Performed by: INTERNAL MEDICINE

## 2018-12-05 PROCEDURE — G8417 CALC BMI ABV UP PARAM F/U: HCPCS | Performed by: INTERNAL MEDICINE

## 2018-12-05 ASSESSMENT — ENCOUNTER SYMPTOMS
LEFT EYE: 0
COUGH: 0
NAUSEA: 0
SORE THROAT: 0
DOUBLE VISION: 0
DIARRHEA: 0
RIGHT EYE: 0
SHORTNESS OF BREATH: 0
BACK PAIN: 0
WHEEZING: 0
ABDOMINAL PAIN: 0
BLURRED VISION: 0
CONSTIPATION: 0
VOMITING: 0

## 2018-12-05 NOTE — PROGRESS NOTES
[facility] [address], [address] [facility]  Dept: [phone]  Dept Fax: [phone]      CARDIAC ELECTROPHYSIOLOGY  CONSULTATION    12/5/2018      REFERRING PROVIDER:  Dr. Luanne Hartmann:  My pacemaker battery needs to be replaced  Chief Complaint   Patient presents with   Toney Rea New Doctor     st Randy Mcgowan dual pacer @ [employer]   BAki pt        HPI:  HPI    12/05/2018: The patient is s/p insertion of a SJM pacemaker for symptomatic tachycardia bradycardia syndrome. The patient's device interrogation on 11/28/2018 showed his device has reached [employer]. The patient presents to discuss having his pacemaker generator change. He is on coumadin for chronic atrial fibrillation. He denies having any recent fevers, or chills. PMH:  History obtained from chart review and the patient. Past Medical History:   Diagnosis Date    Acute on chronic combined systolic and diastolic congestive heart failure (HCC)     Aortic stenosis     Atrial fibrillation (HCC) 1/25/2017    Atrial flutter ([address] 1/25/2017    COPD (chronic obstructive pulmonary disease) ([address]     Coronary artery disease     DM (diabetes mellitus), type 2 with renal complications ([address]     DVT (deep venous thrombosis) ([address]     Hemodialysis patient ([address] 10/22/2016    with [facility]    Hyperlipidemia     Hypertension     Morbid obesity with BMI of 50.0-59.9, adult ([address]     Obstructive sleep apnea     On home oxygen therapy 2013    Osteoarthritis     Pacemaker     [employer] dual pacer    Prostate enlargement        PSH:  Past Surgical History:   Procedure Laterality Date    AORTIC VALVE REPLACEMENT  2/3/2010    tissue valve    CARDIAC CATHETERIZATION  1 20 2010    Severe AS. Mild MV stenosis. Normal coronary arteries. Normal LV systolic function. EF 70%. Moderate concentric LVH. SPAP 47/20.      CARDIAC CATHETERIZATION  8 12 2009    Transvenous dual chamber permanent pacemaer normal range of motion and no swelling. Right upper arm: Normal.        Left upper arm: Normal.        Right upper leg: Normal.        Left upper leg: Normal.        Right lower leg: Normal.        Left lower leg: Normal.   Lymphadenopathy:        Head (right side): No submandibular adenopathy present. Head (left side): No submandibular adenopathy present. Neurological: He is alert and oriented to person, place, and time. He has normal strength. He displays normal reflexes. No cranial nerve deficit or sensory deficit. Coordination and gait normal.   Skin: Skin is warm and dry. No lesion and no rash noted. He is not diaphoretic. No cyanosis. Nails show no clubbing. Psychiatric: His speech is normal and behavior is normal. Judgment and thought content normal. His mood appears not anxious. His affect is not angry. Cognition and memory are normal. He does not exhibit a depressed mood. Nursing note and vitals reviewed. DIAGNOSTIC STUDIES & LABORATORY DATA:    I have personally reviewed andinterpreted the results of the following diagnostic testing  CARDIAC HISTORY:    ECHO: 2018  Summary   Normally functioning bioprosthetic valve in aortic position.   Ejection fraction is visually estimated at 50%.   There was mild global hypokinesis of the left ventricle.   The aortic valve leaflets were not well visualized.   Normally functioning bioprosthetic valve in aortic position.   Thickened aortic valve leaflets noted.   Aortic valve leaflets are Moderately calcified.   Myxomatotic degeneration of mitral valve.   Calcification of the mitral valve noted.   Decreased mitral valve mobility noted.   Mild mitral regurgitation is present.   EC2018 Underlying atrial fibrillation with ventricular pacing  STRESS TEST: 2018  Summary   This Nuclear Medicine study was negative for ischemia.   moderate inferior infarct   abnormal EF  PACEMAKER INTERROGATION:  /[id number]  [employer] dual

## 2018-12-10 ENCOUNTER — TELEPHONE (OUTPATIENT)
Dept: CARDIOLOGY CLINIC | Age: 83
End: 2018-12-10

## 2018-12-10 ENCOUNTER — HOSPITAL ENCOUNTER (OUTPATIENT)
Dept: PHARMACY | Age: 83
Setting detail: THERAPIES SERIES
Discharge: HOME | End: 2018-12-10
Payer: MEDICARE

## 2018-12-10 DIAGNOSIS — I26.99 OTHER PULMONARY EMBOLISM WITHOUT ACUTE COR PULMONALE, UNSPECIFIED CHRONICITY (HCC): ICD-10-CM

## 2018-12-10 DIAGNOSIS — I48.0 PAROXYSMAL ATRIAL FIBRILLATION (HCC): ICD-10-CM

## 2018-12-10 LAB — POC INR: 4.8 (ref 0.8–1.2)

## 2018-12-10 PROCEDURE — 99211 OFF/OP EST MAY X REQ PHY/QHP: CPT | Performed by: PHARMACIST

## 2018-12-10 PROCEDURE — 36416 COLLJ CAPILLARY BLOOD SPEC: CPT | Performed by: PHARMACIST

## 2018-12-10 PROCEDURE — 85610 PROTHROMBIN TIME: CPT | Performed by: PHARMACIST

## 2018-12-11 ENCOUNTER — PREP FOR PROCEDURE (OUTPATIENT)
Dept: CARDIOLOGY | Age: 83
End: 2018-12-11

## 2018-12-11 RX ORDER — SODIUM CHLORIDE 0.9 % (FLUSH) 0.9 %
10 SYRINGE (ML) INJECTION PRN
Status: CANCELLED | OUTPATIENT
Start: 2018-12-11

## 2018-12-11 RX ORDER — SODIUM CHLORIDE 9 MG/ML
INJECTION, SOLUTION INTRAVENOUS CONTINUOUS
Status: CANCELLED | OUTPATIENT
Start: 2018-12-11

## 2018-12-11 RX ORDER — CHLORHEXIDINE GLUCONATE 4 G/100ML
SOLUTION TOPICAL ONCE
Status: CANCELLED | OUTPATIENT
Start: 2018-12-11 | End: 2018-12-11

## 2018-12-11 RX ORDER — SODIUM CHLORIDE 0.9 % (FLUSH) 0.9 %
10 SYRINGE (ML) INJECTION EVERY 12 HOURS SCHEDULED
Status: CANCELLED | OUTPATIENT
Start: 2018-12-11

## 2018-12-11 RX ORDER — TAMSULOSIN HYDROCHLORIDE 0.4 MG/1
0.4 CAPSULE ORAL DAILY
Qty: 90 CAPSULE | Refills: 1 | Status: SHIPPED | OUTPATIENT
Start: 2018-12-11 | End: 2019-06-10 | Stop reason: SDUPTHER

## 2018-12-12 ENCOUNTER — HOSPITAL ENCOUNTER (OUTPATIENT)
Dept: INPATIENT UNIT | Age: 83
Discharge: HOME | End: 2018-12-12
Attending: INTERNAL MEDICINE | Admitting: INTERNAL MEDICINE
Payer: MEDICARE

## 2018-12-12 VITALS
BODY MASS INDEX: 45.1 KG/M2 | WEIGHT: 315 LBS | SYSTOLIC BLOOD PRESSURE: 102 MMHG | DIASTOLIC BLOOD PRESSURE: 74 MMHG | RESPIRATION RATE: 23 BRPM | HEART RATE: 72 BPM | HEIGHT: 70 IN | TEMPERATURE: 98 F | OXYGEN SATURATION: 100 %

## 2018-12-12 LAB
ANION GAP SERPL CALCULATED.3IONS-SCNC: 15 MEQ/L (ref 8–16)
APTT: 48.1 SECONDS (ref 22–38)
BASOPHILS # BLD: 0.7 %
BASOPHILS ABSOLUTE: 0 THOU/MM3 (ref 0–0.1)
BUN BLDV-MCNC: 45 MG/DL (ref 7–22)
CALCIUM SERPL-MCNC: 9.1 MG/DL (ref 8.5–10.5)
CHLORIDE BLD-SCNC: 92 MEQ/L (ref 98–111)
CO2: 27 MEQ/L (ref 23–33)
CREAT SERPL-MCNC: 7.8 MG/DL (ref 0.4–1.2)
EKG ATRIAL RATE: 74 BPM
EKG P AXIS: 63 DEGREES
EKG Q-T INTERVAL: 528 MS
EKG QRS DURATION: 220 MS
EKG QTC CALCULATION (BAZETT): 578 MS
EKG R AXIS: -77 DEGREES
EKG T AXIS: 97 DEGREES
EKG VENTRICULAR RATE: 72 BPM
EOSINOPHIL # BLD: 2.2 %
EOSINOPHILS ABSOLUTE: 0.1 THOU/MM3 (ref 0–0.4)
ERYTHROCYTE [DISTWIDTH] IN BLOOD BY AUTOMATED COUNT: 18.3 % (ref 11.5–14.5)
ERYTHROCYTE [DISTWIDTH] IN BLOOD BY AUTOMATED COUNT: 58.3 FL (ref 35–45)
GFR SERPL CREATININE-BSD FRML MDRD: 8 ML/MIN/1.73M2
GLUCOSE BLD-MCNC: 188 MG/DL (ref 70–108)
HCT VFR BLD CALC: 34.3 % (ref 42–52)
HEMOGLOBIN: 10.4 GM/DL (ref 14–18)
IMMATURE GRANS (ABS): 0.09 THOU/MM3 (ref 0–0.07)
IMMATURE GRANULOCYTES: 1.3 %
INR BLD: 2.87 (ref 0.85–1.13)
LYMPHOCYTES # BLD: 15.6 %
LYMPHOCYTES ABSOLUTE: 1.1 THOU/MM3 (ref 1–4.8)
MCH RBC QN AUTO: 26.7 PG (ref 26–33)
MCHC RBC AUTO-ENTMCNC: 30.3 GM/DL (ref 32.2–35.5)
MCV RBC AUTO: 88.2 FL (ref 80–94)
MONOCYTES # BLD: 11.5 %
MONOCYTES ABSOLUTE: 0.8 THOU/MM3 (ref 0.4–1.3)
NUCLEATED RED BLOOD CELLS: 0 /100 WBC
PLATELET # BLD: 230 THOU/MM3 (ref 130–400)
PMV BLD AUTO: 9.9 FL (ref 9.4–12.4)
POTASSIUM REFLEX MAGNESIUM: 4.9 MEQ/L (ref 3.5–5.2)
RBC # BLD: 3.89 MILL/MM3 (ref 4.7–6.1)
SEG NEUTROPHILS: 68.7 %
SEGMENTED NEUTROPHILS ABSOLUTE COUNT: 4.7 THOU/MM3 (ref 1.8–7.7)
SODIUM BLD-SCNC: 134 MEQ/L (ref 135–145)
WBC # BLD: 6.8 THOU/MM3 (ref 4.8–10.8)

## 2018-12-12 PROCEDURE — 2709999900 HC NON-CHARGEABLE SUPPLY

## 2018-12-12 PROCEDURE — 33228 REMV&REPLC PM GEN DUAL LEAD: CPT

## 2018-12-12 PROCEDURE — 85025 COMPLETE CBC W/AUTO DIFF WBC: CPT

## 2018-12-12 PROCEDURE — 6360000002 HC RX W HCPCS

## 2018-12-12 PROCEDURE — 2580000003 HC RX 258: Performed by: PHYSICIAN ASSISTANT

## 2018-12-12 PROCEDURE — 6360000002 HC RX W HCPCS: Performed by: PHYSICIAN ASSISTANT

## 2018-12-12 PROCEDURE — 93005 ELECTROCARDIOGRAM TRACING: CPT | Performed by: PHYSICIAN ASSISTANT

## 2018-12-12 PROCEDURE — 2500000003 HC RX 250 WO HCPCS: Performed by: INTERNAL MEDICINE

## 2018-12-12 PROCEDURE — 33228 REMV&REPLC PM GEN DUAL LEAD: CPT | Performed by: INTERNAL MEDICINE

## 2018-12-12 PROCEDURE — 33222 RELOCATION POCKET PACEMAKER: CPT | Performed by: INTERNAL MEDICINE

## 2018-12-12 PROCEDURE — 2580000003 HC RX 258: Performed by: INTERNAL MEDICINE

## 2018-12-12 PROCEDURE — 85610 PROTHROMBIN TIME: CPT

## 2018-12-12 PROCEDURE — 36415 COLL VENOUS BLD VENIPUNCTURE: CPT

## 2018-12-12 PROCEDURE — C1785 PMKR, DUAL, RATE-RESP: HCPCS

## 2018-12-12 PROCEDURE — 80048 BASIC METABOLIC PNL TOTAL CA: CPT

## 2018-12-12 PROCEDURE — 85730 THROMBOPLASTIN TIME PARTIAL: CPT

## 2018-12-12 RX ORDER — SODIUM CHLORIDE 9 MG/ML
INJECTION, SOLUTION INTRAVENOUS CONTINUOUS
Status: DISCONTINUED | OUTPATIENT
Start: 2018-12-12 | End: 2018-12-12 | Stop reason: HOSPADM

## 2018-12-12 RX ORDER — SODIUM CHLORIDE 0.9 % (FLUSH) 0.9 %
10 SYRINGE (ML) INJECTION PRN
Status: DISCONTINUED | OUTPATIENT
Start: 2018-12-12 | End: 2018-12-12 | Stop reason: HOSPADM

## 2018-12-12 RX ORDER — CHLORHEXIDINE GLUCONATE 4 G/100ML
SOLUTION TOPICAL ONCE
Status: COMPLETED | OUTPATIENT
Start: 2018-12-12 | End: 2018-12-12

## 2018-12-12 RX ORDER — SODIUM CHLORIDE 0.9 % (FLUSH) 0.9 %
10 SYRINGE (ML) INJECTION EVERY 12 HOURS SCHEDULED
Status: DISCONTINUED | OUTPATIENT
Start: 2018-12-12 | End: 2018-12-12 | Stop reason: HOSPADM

## 2018-12-12 RX ADMIN — CHLORHEXIDINE GLUCONATE: 4 SOLUTION TOPICAL at 17:38

## 2018-12-12 RX ADMIN — SODIUM CHLORIDE: 9 INJECTION, SOLUTION INTRAVENOUS at 11:56

## 2018-12-12 RX ADMIN — Medication 3 G: at 17:37

## 2018-12-12 RX ADMIN — SODIUM CHLORIDE 50000 UNITS: 900 IRRIGANT IRRIGATION at 17:35

## 2018-12-12 RX ADMIN — Medication: at 17:36

## 2018-12-12 ASSESSMENT — PAIN SCALES - GENERAL
PAINLEVEL_OUTOF10: 0

## 2018-12-12 NOTE — H&P
Skin: Negative for dry skin, itching and rash. Musculoskeletal: Negative for arthritis, back pain, joint pain and myalgias. Gastrointestinal: Negative for abdominal pain, constipation, diarrhea, nausea and vomiting. Genitourinary: Negative for dysuria, frequency, hematuria and urgency. Neurological: Negative for dizziness, headaches, light-headedness, numbness, paresthesias, seizures and vertigo. Psychiatric/Behavioral: Negative for depression and memory loss. The patient is not nervous/anxious.          PHYSICAL EXAM:  /60 Comment: wrist  Pulse 76   Ht 5' 10\" (1.778 m)   Wt (!) 350 lb (158.8 kg)   BMI 50.22 kg/m²      Physical Exam   Constitutional: He is oriented to person, place, and time. No distress. HENT:   Head: Normocephalic and atraumatic. Head is without contusion. Right Ear: Hearing and external ear normal. No decreased hearing is noted. Left Ear: Hearing and external ear normal. No decreased hearing is noted. Nose: Nose normal. No sinus tenderness. No epistaxis. Mouth/Throat: Oropharynx is clear and moist. Mucous membranes are not dry. No oropharyngeal exudate. Eyes: Pupils are equal, round, and reactive to light. Conjunctivae and EOM are normal. Right eye exhibits no discharge. Left eye exhibits no discharge. Neck: Trachea normal. Neck supple. No JVD present. No edema present. No thyroid mass present. Cardiovascular: Normal rate, regular rhythm, normal heart sounds and normal pulses. No extrasystoles are present. Pulmonary/Chest: Effort normal and breath sounds normal. He exhibits no tenderness. Breasts are symmetrical.   Abdominal: Soft. Normal appearance and bowel sounds are normal. He exhibits no mass. There is no hepatosplenomegaly. There is no tenderness. No hernia. Musculoskeletal: Normal range of motion. Right shoulder: Normal. He exhibits normal range of motion and no swelling.         Left shoulder: Normal. He exhibits normal range of motion and regurgitation is present. EC2018 Underlying atrial fibrillation with ventricular pacing  STRESS TEST: 2018  Summary   This Nuclear Medicine study was negative for ischemia.   moderate inferior infarct   abnormal EF  PACEMAKER INTERROGATION:    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  Battery patient at [facility] since 10/18/18  A paced 46 %  V Paced 95%  p wave 2 fib waves  rv wave 7.6mV  Atrial threshold -patient in a fib  rv threshold 0.7V @ 0.4ms  Atrial impedance 426 ohms  Ventricle impedance 571ohms              Lab Results   Component Value Date     WBC 6.12 10/18/2018     HGB 10.2 (L) 2018     HCT 32.9 (L) 2018      2017     CHOL 117 2017     TRIG 93 2017     HDL 49 2017     ALT 19 2017     AST 22 2017      10/18/2018     K 4.9 10/18/2018     CL 97 10/18/2018     CREATININE 9.52 10/18/2018     BUN 19 10/04/2018     CO2 22 10/18/2018     TSH 1.420 2016     INR 3.10 (H) 2018     LABA1C 8.0 10/11/2018         IMPRESSION:  Pacemaker battery at [facility]:  I discussed with the patient the procedure to exchange his old pacemaker for anew pacemaker. I discussed with the patient how the procedure is performed and the risks of the procedure. The patient understood the information and consents to the plan of care.     PLAN:  1.  Pacemaker generator change

## 2018-12-12 NOTE — PROGRESS NOTES
Patient admitted to 2E11  Per w/c  for pacemaker generator change. Patient NPO. Patient accompanied by wife. Vital signs obtained. Assessment and data collection intiated. Oriented to room. Policies and procedures for 2E explained. All questions answered with no further questions at this time. Fall prevention and safety precautions discussed with patient.

## 2018-12-13 PROCEDURE — 93010 ELECTROCARDIOGRAM REPORT: CPT | Performed by: INTERNAL MEDICINE

## 2018-12-13 NOTE — OP NOTE
superficial from the original implanter and  that this was a poor implant technique. When I reached the capsule, the  patient's capsule was also \"am eggshell-like consistency\" and therefore,  very difficult to remove the device. Once I removed the device from the  capsule, it was evidence with the combination of the patient's  eggshell-like pocket and superficial location that this would not be  safe for reinsertion of the device due to infection and also risk of  perforation. Therefore, I very carefully removed the superficial pocket  and all the eggshell-like material to restore fresh tissue in contact  with the new pocket. It was very difficult to dissect the leads out  from these scar tissues, but I was able to do successfully without any  damage to the leads. Then, I fashioned a new pocket in the subcuticular  plane between the subcutaneous fat and prepectoral region. Bleeding  vessels were identified and coagulated with a Bovie for hemostasis. The  pocket was then copiously irrigated with antibiotic-containing normal  saline. The leads tips were removed from the header of the old  pacemaker and inserted and secured correctly into the header after they  have been checked through the analyzer into the new [address] [place] [address]  pacemaker. The pocket was like I said copiously irrigated with  antibiotic-containing normal saline. The device was then inserted into  the new pocket with the newly tucked in behind the device in the pocket. The fascial layer was closed with the continuous running 2-0 Vicryl  suture. The subcutaneous fat layer was closed with a continuous running  3-0 Vicryl suture. The cuticular layer was closed with a continuous  running 4-0 Monocryl suture. Steri-Strips and Tegaderm dressing were  applied over the incision. DISPOSITION:  The patient was transferred to recovery in hemodynamically  stable condition. IMMEDIATE COMPLICATIONS:  None.     ESTIMATED BLOOD LOSS: Minimal.    TOTAL MEDICATIONS:  Subcutaneous lidocaine 20 mL. THE PACING AND SENSING THRESHOLD OF THE LEADS:  1. Right atrium:  Polarity, bipolar, atrial fibrillation waves 2.0 mV,  amplitude threshold nonapplicable, impedance 990 ohms. 2.  Right ventricle:  Polarity, bipolar, R-waves 5.5 mV, amplitude  threshold was 0.62 V at 0.5 milliseconds, impedance 450 ohms. The patient's device was programmed to VVIR, lower rate 70, upper rate  150. CONCLUSION:  1. Successful dual-chamber pacemaker generator change. 2.  Pocket revision since the original implanter placed the device too  superficial to allow for safe reinsertion into the pocket due to risk of  infection and perforation.         Chandana Roblero MD    D: 12/12/2018 16:06:00       T: 12/12/2018 19:24:31     [email or web address]  Job#: [id number]     Doc#: [id number]    CC:

## 2018-12-17 ENCOUNTER — HOSPITAL ENCOUNTER (OUTPATIENT)
Dept: PHARMACY | Age: 83
Setting detail: THERAPIES SERIES
Discharge: HOME | End: 2018-12-17
Payer: MEDICARE

## 2018-12-17 DIAGNOSIS — I26.99 OTHER PULMONARY EMBOLISM WITHOUT ACUTE COR PULMONALE, UNSPECIFIED CHRONICITY (HCC): ICD-10-CM

## 2018-12-17 DIAGNOSIS — I48.0 PAROXYSMAL ATRIAL FIBRILLATION (HCC): ICD-10-CM

## 2018-12-17 LAB — POC INR: 2.4 (ref 0.8–1.2)

## 2018-12-17 PROCEDURE — 85610 PROTHROMBIN TIME: CPT

## 2018-12-17 PROCEDURE — 36416 COLLJ CAPILLARY BLOOD SPEC: CPT

## 2018-12-17 PROCEDURE — 99211 OFF/OP EST MAY X REQ PHY/QHP: CPT

## 2018-12-17 NOTE — PROGRESS NOTES
Medication Management [facility]  Anticoagulation Clinic  [phone] (phone)  [phone] (fax)      Mr. Brooks Orozco is a 80 y.o.  male with history of PE and Afib who presents today for anticoagulation monitoring and adjustment. Patient verifies current dosing regimen and tablet strength.-Wife follows the calender when filling pill box  No missed or extra doses. Patient denies s/s bleeding/bruising/swelling/SOB/chest pain-SOB that is normal for him. Has bruises, but states they are healing. No blood in urine or stool. No dietary changes. No changes in medication/OTC agents/Herbals. No change in alcohol use or tobacco use. No change in activity level. Patient denies headaches/dizziness/lightheadedness/falls. No vomiting/diarrhea or acute illness. No Procedures scheduled in the future at this time. Assessment:   Lab Results   Component Value Date    INR 2.40 (H) 12/17/2018    INR 2.87 (H) 12/12/2018    INR 4.80 (H) 12/10/2018     INR therapeutic   Recent Labs      12/17/18   1052   INR  2.40*         Plan:  Decrease Coumadin 6 mg daily (6.5% decrease). Recheck INR in 1 weeks. Assessment and plan reviewed with Meenu Asp, PharmD. Patient reminded to call the Anticoagulation Clinic with signs or symptoms of bleeding or with any medication changes. Patient given instructions utilizing the teach back method. Discharged ambulatory in no apparent distress. After visit summary printed and reviewed with patient.       Medications reviewed and updated on home medication list Yes    Influenza vaccine:     [] given    [x] declined   [x] received previously   [] plans to receive at a later time   [] refused    [x] documented in EPIC

## 2018-12-19 ENCOUNTER — NURSE ONLY (OUTPATIENT)
Dept: CARDIOLOGY CLINIC | Age: 83
End: 2018-12-19
Payer: MEDICARE

## 2018-12-19 DIAGNOSIS — Z95.0 PACEMAKER: Primary | ICD-10-CM

## 2018-12-19 PROCEDURE — 93279 PRGRMG DEV EVAL PM/LDLS PM: CPT | Performed by: INTERNAL MEDICINE

## 2018-12-26 ENCOUNTER — HOSPITAL ENCOUNTER (OUTPATIENT)
Dept: PHARMACY | Age: 83
Setting detail: THERAPIES SERIES
Discharge: HOME | End: 2018-12-26
Payer: MEDICARE

## 2018-12-26 DIAGNOSIS — I48.0 PAROXYSMAL ATRIAL FIBRILLATION (HCC): ICD-10-CM

## 2018-12-26 DIAGNOSIS — I26.99 OTHER PULMONARY EMBOLISM WITHOUT ACUTE COR PULMONALE, UNSPECIFIED CHRONICITY (HCC): ICD-10-CM

## 2018-12-26 LAB — POC INR: 2.9 (ref 0.8–1.2)

## 2018-12-26 PROCEDURE — 85610 PROTHROMBIN TIME: CPT

## 2018-12-26 PROCEDURE — 99211 OFF/OP EST MAY X REQ PHY/QHP: CPT

## 2018-12-26 PROCEDURE — 36416 COLLJ CAPILLARY BLOOD SPEC: CPT

## 2019-01-01 ENCOUNTER — NURSE ONLY (OUTPATIENT)
Dept: CARDIOLOGY CLINIC | Age: 84
End: 2019-01-01
Payer: MEDICARE

## 2019-01-01 ENCOUNTER — TELEPHONE (OUTPATIENT)
Dept: PHARMACY | Age: 84
End: 2019-01-01

## 2019-01-01 ENCOUNTER — TELEPHONE (OUTPATIENT)
Dept: CARDIOLOGY CLINIC | Age: 84
End: 2019-01-01

## 2019-01-01 ENCOUNTER — HOSPITAL ENCOUNTER (OUTPATIENT)
Dept: PHARMACY | Age: 84
Setting detail: THERAPIES SERIES
Discharge: HOME | End: 2019-12-18
Payer: MEDICARE

## 2019-01-01 ENCOUNTER — OFFICE VISIT (OUTPATIENT)
Dept: CARDIOLOGY CLINIC | Age: 84
End: 2019-01-01
Payer: MEDICARE

## 2019-01-01 VITALS
SYSTOLIC BLOOD PRESSURE: 134 MMHG | HEIGHT: 70 IN | DIASTOLIC BLOOD PRESSURE: 84 MMHG | WEIGHT: 315 LBS | BODY MASS INDEX: 45.1 KG/M2 | HEART RATE: 70 BPM

## 2019-01-01 DIAGNOSIS — I48.0 PAROXYSMAL ATRIAL FIBRILLATION (HCC): Primary | ICD-10-CM

## 2019-01-01 DIAGNOSIS — I27.82 OTHER CHRONIC PULMONARY EMBOLISM WITHOUT ACUTE COR PULMONALE (HCC): ICD-10-CM

## 2019-01-01 DIAGNOSIS — I10 ESSENTIAL HYPERTENSION: ICD-10-CM

## 2019-01-01 DIAGNOSIS — Z95.2 S/P AVR: ICD-10-CM

## 2019-01-01 DIAGNOSIS — I48.0 PAROXYSMAL ATRIAL FIBRILLATION (HCC): ICD-10-CM

## 2019-01-01 DIAGNOSIS — I25.810 CORONARY ARTERY DISEASE INVOLVING CORONARY BYPASS GRAFT OF NATIVE HEART WITHOUT ANGINA PECTORIS: ICD-10-CM

## 2019-01-01 DIAGNOSIS — Z95.0 PACEMAKER: Primary | ICD-10-CM

## 2019-01-01 LAB
HCT VFR BLD CALC: 35.3 % (ref 41–53)
HEMOGLOBIN: 10.5 G/DL (ref 13.5–17.5)
POC INR: 2.7 (ref 0.8–1.2)

## 2019-01-01 PROCEDURE — 1123F ACP DISCUSS/DSCN MKR DOCD: CPT | Performed by: NUCLEAR MEDICINE

## 2019-01-01 PROCEDURE — 93279 PRGRMG DEV EVAL PM/LDLS PM: CPT | Performed by: INTERNAL MEDICINE

## 2019-01-01 PROCEDURE — 99211 OFF/OP EST MAY X REQ PHY/QHP: CPT

## 2019-01-01 PROCEDURE — G8484 FLU IMMUNIZE NO ADMIN: HCPCS | Performed by: NUCLEAR MEDICINE

## 2019-01-01 PROCEDURE — 36416 COLLJ CAPILLARY BLOOD SPEC: CPT

## 2019-01-01 PROCEDURE — 85610 PROTHROMBIN TIME: CPT

## 2019-01-01 PROCEDURE — G8598 ASA/ANTIPLAT THER USED: HCPCS | Performed by: NUCLEAR MEDICINE

## 2019-01-01 PROCEDURE — G8427 DOCREV CUR MEDS BY ELIG CLIN: HCPCS | Performed by: NUCLEAR MEDICINE

## 2019-01-01 PROCEDURE — 93000 ELECTROCARDIOGRAM COMPLETE: CPT | Performed by: NUCLEAR MEDICINE

## 2019-01-01 PROCEDURE — 4040F PNEUMOC VAC/ADMIN/RCVD: CPT | Performed by: NUCLEAR MEDICINE

## 2019-01-01 PROCEDURE — 1036F TOBACCO NON-USER: CPT | Performed by: NUCLEAR MEDICINE

## 2019-01-01 PROCEDURE — G8417 CALC BMI ABV UP PARAM F/U: HCPCS | Performed by: NUCLEAR MEDICINE

## 2019-01-01 PROCEDURE — 99213 OFFICE O/P EST LOW 20 MIN: CPT | Performed by: NUCLEAR MEDICINE

## 2019-01-09 ENCOUNTER — HOSPITAL ENCOUNTER (OUTPATIENT)
Dept: PHARMACY | Age: 84
Setting detail: THERAPIES SERIES
Discharge: HOME | End: 2019-01-09
Payer: MEDICARE

## 2019-01-09 DIAGNOSIS — I48.0 PAROXYSMAL ATRIAL FIBRILLATION (HCC): ICD-10-CM

## 2019-01-09 DIAGNOSIS — I26.99 OTHER PULMONARY EMBOLISM WITHOUT ACUTE COR PULMONALE, UNSPECIFIED CHRONICITY (HCC): ICD-10-CM

## 2019-01-09 LAB — POC INR: 2.8 (ref 0.8–1.2)

## 2019-01-09 PROCEDURE — 85610 PROTHROMBIN TIME: CPT

## 2019-01-09 PROCEDURE — 36416 COLLJ CAPILLARY BLOOD SPEC: CPT

## 2019-01-09 PROCEDURE — 99211 OFF/OP EST MAY X REQ PHY/QHP: CPT

## 2019-01-14 RX ORDER — RENO CAPS 100; 1.5; 1.7; 20; 10; 1; 150; 5; 6 MG/1; MG/1; MG/1; MG/1; MG/1; MG/1; UG/1; MG/1; UG/1
1 CAPSULE ORAL DAILY
Qty: 90 CAPSULE | Refills: 1 | Status: SHIPPED | OUTPATIENT
Start: 2019-01-14 | End: 2019-03-08

## 2019-01-15 ENCOUNTER — OFFICE VISIT (OUTPATIENT)
Dept: FAMILY MEDICINE CLINIC | Age: 84
End: 2019-01-15

## 2019-01-15 VITALS
BODY MASS INDEX: 50.22 KG/M2 | HEART RATE: 68 BPM | HEIGHT: 70 IN | RESPIRATION RATE: 20 BRPM | SYSTOLIC BLOOD PRESSURE: 120 MMHG | DIASTOLIC BLOOD PRESSURE: 68 MMHG

## 2019-01-15 DIAGNOSIS — J42 CHRONIC BRONCHITIS, UNSPECIFIED CHRONIC BRONCHITIS TYPE (HCC): ICD-10-CM

## 2019-01-15 DIAGNOSIS — Z79.4 TYPE 2 DIABETES MELLITUS WITH STAGE 4 CHRONIC KIDNEY DISEASE, WITH LONG-TERM CURRENT USE OF INSULIN (HCC): Primary | ICD-10-CM

## 2019-01-15 DIAGNOSIS — E78.5 HYPERLIPIDEMIA, UNSPECIFIED HYPERLIPIDEMIA TYPE: ICD-10-CM

## 2019-01-15 DIAGNOSIS — N18.4 TYPE 2 DIABETES MELLITUS WITH STAGE 4 CHRONIC KIDNEY DISEASE, WITH LONG-TERM CURRENT USE OF INSULIN (HCC): Primary | ICD-10-CM

## 2019-01-15 DIAGNOSIS — Z99.2 HEMODIALYSIS PATIENT (HCC): ICD-10-CM

## 2019-01-15 DIAGNOSIS — J44.9 CHRONIC OBSTRUCTIVE PULMONARY DISEASE, UNSPECIFIED COPD TYPE (HCC): ICD-10-CM

## 2019-01-15 DIAGNOSIS — N25.81 SECONDARY HYPERPARATHYROIDISM OF RENAL ORIGIN (HCC): ICD-10-CM

## 2019-01-15 DIAGNOSIS — I10 ESSENTIAL HYPERTENSION: ICD-10-CM

## 2019-01-15 DIAGNOSIS — E11.22 TYPE 2 DIABETES MELLITUS WITH STAGE 4 CHRONIC KIDNEY DISEASE, WITH LONG-TERM CURRENT USE OF INSULIN (HCC): Primary | ICD-10-CM

## 2019-01-15 LAB
GLUCOSE BLD-MCNC: 127 MG/DL
HBA1C MFR BLD: 7.6 %

## 2019-01-15 PROCEDURE — 1101F PT FALLS ASSESS-DOCD LE1/YR: CPT | Performed by: EMERGENCY MEDICINE

## 2019-01-15 PROCEDURE — G8427 DOCREV CUR MEDS BY ELIG CLIN: HCPCS | Performed by: EMERGENCY MEDICINE

## 2019-01-15 PROCEDURE — 1123F ACP DISCUSS/DSCN MKR DOCD: CPT | Performed by: EMERGENCY MEDICINE

## 2019-01-15 PROCEDURE — 83036 HEMOGLOBIN GLYCOSYLATED A1C: CPT | Performed by: EMERGENCY MEDICINE

## 2019-01-15 PROCEDURE — G8598 ASA/ANTIPLAT THER USED: HCPCS | Performed by: EMERGENCY MEDICINE

## 2019-01-15 PROCEDURE — 99213 OFFICE O/P EST LOW 20 MIN: CPT | Performed by: EMERGENCY MEDICINE

## 2019-01-15 PROCEDURE — G8417 CALC BMI ABV UP PARAM F/U: HCPCS | Performed by: EMERGENCY MEDICINE

## 2019-01-15 PROCEDURE — 4040F PNEUMOC VAC/ADMIN/RCVD: CPT | Performed by: EMERGENCY MEDICINE

## 2019-01-15 PROCEDURE — 82962 GLUCOSE BLOOD TEST: CPT | Performed by: EMERGENCY MEDICINE

## 2019-01-15 PROCEDURE — 1036F TOBACCO NON-USER: CPT | Performed by: EMERGENCY MEDICINE

## 2019-01-15 ASSESSMENT — ENCOUNTER SYMPTOMS
VISUAL CHANGE: 0
SHORTNESS OF BREATH: 1

## 2019-01-28 ENCOUNTER — HOSPITAL ENCOUNTER (OUTPATIENT)
Dept: PHARMACY | Age: 84
Setting detail: THERAPIES SERIES
Discharge: HOME | End: 2019-01-28
Payer: MEDICARE

## 2019-01-28 DIAGNOSIS — I48.0 PAROXYSMAL ATRIAL FIBRILLATION (HCC): ICD-10-CM

## 2019-01-28 DIAGNOSIS — I26.99 OTHER PULMONARY EMBOLISM WITHOUT ACUTE COR PULMONALE, UNSPECIFIED CHRONICITY (HCC): ICD-10-CM

## 2019-01-28 LAB — POC INR: 2.6 (ref 0.8–1.2)

## 2019-01-28 PROCEDURE — 85610 PROTHROMBIN TIME: CPT

## 2019-01-28 PROCEDURE — 36416 COLLJ CAPILLARY BLOOD SPEC: CPT

## 2019-01-28 PROCEDURE — 99211 OFF/OP EST MAY X REQ PHY/QHP: CPT

## 2019-01-30 ASSESSMENT — ENCOUNTER SYMPTOMS
CONSTIPATION: 0
TROUBLE SWALLOWING: 0
SINUS PRESSURE: 0
ABDOMINAL PAIN: 0
COUGH: 0
CHEST TIGHTNESS: 0
BACK PAIN: 0
DIARRHEA: 0
WHEEZING: 0
RHINORRHEA: 0
SORE THROAT: 0
NAUSEA: 0
VOMITING: 0
VOICE CHANGE: 0

## 2019-01-30 ASSESSMENT — PATIENT HEALTH QUESTIONNAIRE - PHQ9
SUM OF ALL RESPONSES TO PHQ QUESTIONS 1-9: 0
1. LITTLE INTEREST OR PLEASURE IN DOING THINGS: 0
SUM OF ALL RESPONSES TO PHQ QUESTIONS 1-9: 0
2. FEELING DOWN, DEPRESSED OR HOPELESS: 0
SUM OF ALL RESPONSES TO PHQ9 QUESTIONS 1 & 2: 0

## 2019-02-21 LAB
AVERAGE GLUCOSE: NORMAL
HBA1C MFR BLD: 8.3 %

## 2019-02-25 ENCOUNTER — HOSPITAL ENCOUNTER (OUTPATIENT)
Dept: PHARMACY | Age: 84
Setting detail: THERAPIES SERIES
Discharge: HOME | End: 2019-02-25
Payer: MEDICARE

## 2019-02-25 DIAGNOSIS — I48.0 PAROXYSMAL ATRIAL FIBRILLATION (HCC): ICD-10-CM

## 2019-02-25 DIAGNOSIS — I26.99 OTHER PULMONARY EMBOLISM WITHOUT ACUTE COR PULMONALE, UNSPECIFIED CHRONICITY (HCC): ICD-10-CM

## 2019-02-25 LAB — POC INR: 3 (ref 0.8–1.2)

## 2019-02-25 PROCEDURE — 36416 COLLJ CAPILLARY BLOOD SPEC: CPT

## 2019-02-25 PROCEDURE — 85610 PROTHROMBIN TIME: CPT

## 2019-02-25 PROCEDURE — 99211 OFF/OP EST MAY X REQ PHY/QHP: CPT

## 2019-02-26 RX ORDER — BLOOD SUGAR DIAGNOSTIC
STRIP MISCELLANEOUS
Qty: 300 STRIP | Refills: 3 | Status: SHIPPED | OUTPATIENT
Start: 2019-02-26 | End: 2020-01-01

## 2019-03-08 ENCOUNTER — OFFICE VISIT (OUTPATIENT)
Dept: CARDIOLOGY CLINIC | Age: 84
End: 2019-03-08
Payer: MEDICARE

## 2019-03-08 VITALS
HEIGHT: 70 IN | HEART RATE: 68 BPM | WEIGHT: 315 LBS | DIASTOLIC BLOOD PRESSURE: 70 MMHG | BODY MASS INDEX: 45.1 KG/M2 | SYSTOLIC BLOOD PRESSURE: 130 MMHG

## 2019-03-08 DIAGNOSIS — Z95.0 PACEMAKER: ICD-10-CM

## 2019-03-08 DIAGNOSIS — I10 ESSENTIAL HYPERTENSION: ICD-10-CM

## 2019-03-08 DIAGNOSIS — I25.810 CORONARY ARTERY DISEASE INVOLVING CORONARY BYPASS GRAFT OF NATIVE HEART WITHOUT ANGINA PECTORIS: Primary | ICD-10-CM

## 2019-03-08 DIAGNOSIS — Z95.2 S/P AVR: ICD-10-CM

## 2019-03-08 DIAGNOSIS — I48.0 PAROXYSMAL ATRIAL FIBRILLATION (HCC): ICD-10-CM

## 2019-03-08 DIAGNOSIS — R06.02 SOB (SHORTNESS OF BREATH): ICD-10-CM

## 2019-03-08 PROCEDURE — G8428 CUR MEDS NOT DOCUMENT: HCPCS | Performed by: NUCLEAR MEDICINE

## 2019-03-08 PROCEDURE — 1123F ACP DISCUSS/DSCN MKR DOCD: CPT | Performed by: NUCLEAR MEDICINE

## 2019-03-08 PROCEDURE — 1101F PT FALLS ASSESS-DOCD LE1/YR: CPT | Performed by: NUCLEAR MEDICINE

## 2019-03-08 PROCEDURE — 1036F TOBACCO NON-USER: CPT | Performed by: NUCLEAR MEDICINE

## 2019-03-08 PROCEDURE — 99213 OFFICE O/P EST LOW 20 MIN: CPT | Performed by: NUCLEAR MEDICINE

## 2019-03-08 PROCEDURE — G8598 ASA/ANTIPLAT THER USED: HCPCS | Performed by: NUCLEAR MEDICINE

## 2019-03-08 PROCEDURE — 4040F PNEUMOC VAC/ADMIN/RCVD: CPT | Performed by: NUCLEAR MEDICINE

## 2019-03-08 PROCEDURE — G8417 CALC BMI ABV UP PARAM F/U: HCPCS | Performed by: NUCLEAR MEDICINE

## 2019-03-08 PROCEDURE — G8484 FLU IMMUNIZE NO ADMIN: HCPCS | Performed by: NUCLEAR MEDICINE

## 2019-03-25 ENCOUNTER — HOSPITAL ENCOUNTER (OUTPATIENT)
Dept: PHARMACY | Age: 84
Setting detail: THERAPIES SERIES
Discharge: HOME | End: 2019-03-25
Payer: MEDICARE

## 2019-03-25 DIAGNOSIS — I26.99 OTHER PULMONARY EMBOLISM WITHOUT ACUTE COR PULMONALE, UNSPECIFIED CHRONICITY (HCC): ICD-10-CM

## 2019-03-25 DIAGNOSIS — I48.0 PAROXYSMAL ATRIAL FIBRILLATION (HCC): ICD-10-CM

## 2019-03-25 LAB — POC INR: 2.5 (ref 0.8–1.2)

## 2019-03-25 PROCEDURE — 85610 PROTHROMBIN TIME: CPT

## 2019-03-25 PROCEDURE — 36416 COLLJ CAPILLARY BLOOD SPEC: CPT

## 2019-03-25 PROCEDURE — 99211 OFF/OP EST MAY X REQ PHY/QHP: CPT

## 2019-03-26 ENCOUNTER — PROCEDURE VISIT (OUTPATIENT)
Dept: CARDIOLOGY CLINIC | Age: 84
End: 2019-03-26
Payer: MEDICARE

## 2019-03-26 DIAGNOSIS — Z95.0 PACEMAKER: Primary | ICD-10-CM

## 2019-03-26 PROCEDURE — 93294 REM INTERROG EVL PM/LDLS PM: CPT | Performed by: INTERNAL MEDICINE

## 2019-03-26 PROCEDURE — 93296 REM INTERROG EVL PM/IDS: CPT | Performed by: INTERNAL MEDICINE

## 2019-04-08 RX ORDER — MIDODRINE HYDROCHLORIDE 10 MG/1
10 TABLET ORAL
Qty: 270 TABLET | Refills: 1 | Status: SHIPPED | OUTPATIENT
Start: 2019-04-08 | End: 2019-08-13 | Stop reason: SDUPTHER

## 2019-04-08 NOTE — TELEPHONE ENCOUNTER
The pharmacy is  requesting a refill of the below medication which has been pended for you:     Requested Prescriptions     Pending Prescriptions Disp Refills    midodrine (PROAMATINE) 10 MG tablet [Pharmacy Med Name: MIDODRINE HCL 10 MG TABLET] 270 tablet 1     Sig: TAKE 1 TABLET BY MOUTH 3 TIMES DAILY (WITH MEALS)       Last Appointment Date: 1/15/2019  Next Appointment Date: 4/16/2019    No Known Allergies

## 2019-04-16 ENCOUNTER — OFFICE VISIT (OUTPATIENT)
Dept: FAMILY MEDICINE CLINIC | Age: 84
End: 2019-04-16

## 2019-04-16 VITALS
HEART RATE: 62 BPM | SYSTOLIC BLOOD PRESSURE: 120 MMHG | DIASTOLIC BLOOD PRESSURE: 62 MMHG | WEIGHT: 315 LBS | BODY MASS INDEX: 45.1 KG/M2 | RESPIRATION RATE: 18 BRPM | HEIGHT: 70 IN

## 2019-04-16 DIAGNOSIS — E11.22 TYPE 2 DIABETES MELLITUS WITH STAGE 4 CHRONIC KIDNEY DISEASE, WITH LONG-TERM CURRENT USE OF INSULIN (HCC): ICD-10-CM

## 2019-04-16 DIAGNOSIS — Z79.4 TYPE 2 DIABETES MELLITUS WITH STAGE 4 CHRONIC KIDNEY DISEASE, WITH LONG-TERM CURRENT USE OF INSULIN (HCC): ICD-10-CM

## 2019-04-16 DIAGNOSIS — I10 ESSENTIAL HYPERTENSION: ICD-10-CM

## 2019-04-16 DIAGNOSIS — E78.5 HYPERLIPIDEMIA, UNSPECIFIED HYPERLIPIDEMIA TYPE: ICD-10-CM

## 2019-04-16 DIAGNOSIS — J44.9 CHRONIC OBSTRUCTIVE PULMONARY DISEASE, UNSPECIFIED COPD TYPE (HCC): ICD-10-CM

## 2019-04-16 DIAGNOSIS — Z99.2 HEMODIALYSIS PATIENT (HCC): ICD-10-CM

## 2019-04-16 DIAGNOSIS — N18.4 TYPE 2 DIABETES MELLITUS WITH STAGE 4 CHRONIC KIDNEY DISEASE, WITH LONG-TERM CURRENT USE OF INSULIN (HCC): ICD-10-CM

## 2019-04-16 DIAGNOSIS — H91.93 BILATERAL HEARING LOSS, UNSPECIFIED HEARING LOSS TYPE: Primary | ICD-10-CM

## 2019-04-16 DIAGNOSIS — H65.00 ACUTE SEROUS OTITIS MEDIA, RECURRENCE NOT SPECIFIED, UNSPECIFIED LATERALITY: ICD-10-CM

## 2019-04-16 PROCEDURE — 1123F ACP DISCUSS/DSCN MKR DOCD: CPT | Performed by: EMERGENCY MEDICINE

## 2019-04-16 PROCEDURE — G8598 ASA/ANTIPLAT THER USED: HCPCS | Performed by: EMERGENCY MEDICINE

## 2019-04-16 PROCEDURE — 1036F TOBACCO NON-USER: CPT | Performed by: EMERGENCY MEDICINE

## 2019-04-16 PROCEDURE — 4040F PNEUMOC VAC/ADMIN/RCVD: CPT | Performed by: EMERGENCY MEDICINE

## 2019-04-16 PROCEDURE — G8427 DOCREV CUR MEDS BY ELIG CLIN: HCPCS | Performed by: EMERGENCY MEDICINE

## 2019-04-16 PROCEDURE — G8417 CALC BMI ABV UP PARAM F/U: HCPCS | Performed by: EMERGENCY MEDICINE

## 2019-04-16 PROCEDURE — 99214 OFFICE O/P EST MOD 30 MIN: CPT | Performed by: EMERGENCY MEDICINE

## 2019-04-16 RX ORDER — RENO CAPS 100; 1.5; 1.7; 20; 10; 1; 150; 5; 6 MG/1; MG/1; MG/1; MG/1; MG/1; MG/1; UG/1; MG/1; UG/1
CAPSULE ORAL
Refills: 11 | COMMUNITY
Start: 2019-03-18 | End: 2020-01-01 | Stop reason: SDUPTHER

## 2019-04-16 RX ORDER — CEFUROXIME AXETIL 250 MG/1
250 TABLET ORAL 2 TIMES DAILY
Qty: 20 TABLET | Refills: 0 | Status: SHIPPED | OUTPATIENT
Start: 2019-04-16 | End: 2019-04-26

## 2019-04-16 RX ORDER — ATORVASTATIN CALCIUM 40 MG/1
40 TABLET, FILM COATED ORAL DAILY
Qty: 90 TABLET | Refills: 1 | Status: SHIPPED | OUTPATIENT
Start: 2019-04-16 | End: 2019-10-22 | Stop reason: SDUPTHER

## 2019-04-16 RX ORDER — AMMONIUM LACTATE 12 G/100G
CREAM TOPICAL 2 TIMES DAILY PRN
Refills: 11 | COMMUNITY
Start: 2019-04-04

## 2019-04-16 ASSESSMENT — ENCOUNTER SYMPTOMS
VISUAL CHANGE: 0
RHINORRHEA: 0
ABDOMINAL PAIN: 0
VOICE CHANGE: 0
CHEST TIGHTNESS: 0
NAUSEA: 0
BACK PAIN: 0
CONSTIPATION: 0
TROUBLE SWALLOWING: 0
WHEEZING: 0
DIARRHEA: 0
SINUS PRESSURE: 0
COUGH: 0
SORE THROAT: 0
VOMITING: 0

## 2019-04-16 NOTE — PROGRESS NOTES
injection pen 140-199=3UNITS,200-249=6U,250-299=9U,300-349=12U,350-400=14U,401-425=18U,>425 CALL MD 15 pen 1    acetaminophen (TYLENOL 8 HOUR) 650 MG extended release tablet Take 650 mg by mouth every 4 hours Don't take more than 3,000 mg each day.  isosorbide mononitrate (IMDUR) 30 MG extended release tablet Take 1 tablet by mouth daily 90 tablet 1    fludrocortisone (FLORINEF) 0.1 MG tablet Take 1 tablet by mouth daily 90 tablet 1    fluticasone (FLONASE) 50 MCG/ACT nasal spray USE 2 SPRAYS IN EACH NOSTRIL TWICE A DAY 1 Bottle 2    metoprolol tartrate (LOPRESSOR) 25 MG tablet Take 0.5 tablets by mouth Daily 60 tablet 5    Ferric Citrate (AURYXIA) 1  MG(Fe) TABS Take 1 tablet by mouth 3 times daily (Patient taking differently: Take 2 tablets by mouth 3 times daily ) 270 tablet 1    aspirin 81 MG chewable tablet Take 1 tablet by mouth daily 100 tablet 1    timolol (TIMOPTIC) 0.5 % ophthalmic solution Place 1 drop into both eyes 2 times daily Indications: Disease of the Eye 15 mL 1    OXYGEN by Nasal route See Admin Instructions Indications: Oxygen Therapy      warfarin (COUMADIN) 2.5 MG tablet Indications: Anticoagulant Therapy, Atrial Fibrillation, Blockage of Blood Vessel to Lung by a Particle Take as directed by [facility]. No current facility-administered medications for this visit. Subjective:     Review of Systems   Constitutional: Negative for appetite change, chills, diaphoresis, fatigue, fever and weight loss. HENT: Positive for hearing loss. Negative for congestion, ear discharge, ear pain, postnasal drip, rhinorrhea, sinus pressure, sore throat, trouble swallowing and voice change. Respiratory: Negative for cough, chest tightness and wheezing. Cardiovascular: Negative for chest pain, palpitations and leg swelling. Gastrointestinal: Negative for abdominal pain, constipation, diarrhea, nausea and vomiting.    Musculoskeletal: Negative for arthralgias, back pain, joint swelling, myalgias, neck pain and neck stiffness. Skin: Negative for rash. Neurological: Negative for dizziness, syncope, weakness, light-headedness, numbness and headaches. Objective:     /62 (Site: Left Upper Arm, Position: Sitting, Cuff Size: Large Adult)   Pulse 62   Resp 18   Ht 5' 10\" (1.778 m)   Wt (!) 348 lb 9.6 oz (158.1 kg)   BMI 50.02 kg/m²   BP Readings from Last 3 Encounters:   04/16/19 120/62   03/08/19 130/70   01/15/19 120/68     Wt Readings from Last 3 Encounters:   04/16/19 (!) 348 lb 9.6 oz (158.1 kg)   03/08/19 (!) 349 lb (158.3 kg)   12/12/18 (!) 350 lb (158.8 kg)        Physical Exam   Constitutional: He is oriented to person, place, and time. He appears well-developed and well-nourished. He is cooperative. HENT:   Head: Normocephalic and atraumatic. Right Ear: External ear normal. Tympanic membrane is bulging. A middle ear effusion is present. Left Ear: External ear normal. Tympanic membrane is bulging. A middle ear effusion is present. Nose: Nose normal.   Mouth/Throat: Oropharynx is clear and moist.   Eyes: Pupils are equal, round, and reactive to light. Conjunctivae and EOM are normal. No scleral icterus. Neck: Normal range of motion. Neck supple. No JVD present. No thyromegaly present. Cardiovascular: Normal rate, regular rhythm and intact distal pulses. Exam reveals no friction rub. No murmur heard. Pulmonary/Chest: Effort normal and breath sounds normal. He has no wheezes. He has no rales. He exhibits no tenderness. Abdominal: Soft. Bowel sounds are normal. He exhibits no mass. There is no tenderness. Musculoskeletal: He exhibits no edema. Lymphadenopathy:     He has no cervical adenopathy. Neurological: He is alert and oriented to person, place, and time. Skin: No rash noted. Vitals reviewed. Assessment:       Diagnosis Orders   1.  Bilateral hearing loss, unspecified hearing loss type  [employer], Jai Mak MD, Otolaryngology, [facility] RENEALARRY  LIZ [email or web address].[email or web address]   2. Type 2 diabetes mellitus with stage 4 chronic kidney disease, with long-term current use of insulin ([facility]     3. Essential hypertension     4. Hyperlipidemia, unspecified hyperlipidemia type     5. Chronic obstructive pulmonary disease, unspecified COPD type ([facility]     6. Hemodialysis patient ([facility]     7. Acute serous otitis media, recurrence not specified, unspecified laterality         Plan:      Medications Prescribed:  Orders Placed This Encounter   Medications    atorvastatin (LIPITOR) 40 MG tablet     Sig: Take 1 tablet by mouth daily     Dispense:  90 tablet     Refill:  1    Insulin Pen Needle (B-D UF III MINI PEN NEEDLES) 31G X 5 MM MISC     Si each by Does not apply route 4 times daily     Dispense:  300 each     Refill:  1    cefUROXime (CEFTIN) 250 MG tablet     Sig: Take 1 tablet by mouth 2 times daily for 10 days     Dispense:  20 tablet     Refill:  0       Orders Placed:  Orders Placed This Encounter   Procedures   Emerson Munoz MD, Otolaryngology, [facility] CELINE HILL II.SCOTT     Referral Priority:   Routine     Referral Type:   Eval and Treat     Referral Reason:   Specialty Services Required     Referred to Provider:   Baldemar Artis MD     Requested Specialty:   Otolaryngology     Number of Visits Requested:   1     Lab Results   Component Value Date    LABA1C 8.3 2019    LABA1C 7.6 01/15/2019    LABA1C 8.0 10/11/2018     Lab Results   Component Value Date    LDLCALC 151 2018    CREATININE 7.8 ([place]) 2018         Continue to monitor blood sugars 3times a day. Return in about 3 months (around 2019) for DM. Discussed use, benefit, and side effectsof prescribed medications. All patient questions answered. Pt voiced understanding. Instructed to continue current medications, diet and exercise. Patient agreedwith treatment plan.

## 2019-04-29 ENCOUNTER — HOSPITAL ENCOUNTER (OUTPATIENT)
Dept: PHARMACY | Age: 84
Setting detail: THERAPIES SERIES
Discharge: HOME | End: 2019-04-29
Payer: MEDICARE

## 2019-04-29 DIAGNOSIS — I48.0 PAROXYSMAL ATRIAL FIBRILLATION (HCC): ICD-10-CM

## 2019-04-29 DIAGNOSIS — I26.99 OTHER PULMONARY EMBOLISM WITHOUT ACUTE COR PULMONALE, UNSPECIFIED CHRONICITY (HCC): ICD-10-CM

## 2019-04-29 LAB — POC INR: 2.2 (ref 0.8–1.2)

## 2019-04-29 PROCEDURE — 36416 COLLJ CAPILLARY BLOOD SPEC: CPT

## 2019-04-29 PROCEDURE — 85610 PROTHROMBIN TIME: CPT

## 2019-04-29 PROCEDURE — 99211 OFF/OP EST MAY X REQ PHY/QHP: CPT

## 2019-04-29 NOTE — PROGRESS NOTES
Medication Management [facility]  Anticoagulation Clinic  [phone] (phone)  [phone] (fax)      Mr. Beth Stone is a 80 y.o.  male with history of paroxysmal Afib, PE who presents today for anticoagulation monitoring and adjustment. Patient verifies current dosing regimen and tablet strength. No missed or extra doses. Patient denies s/s bleeding/bruising/swelling/chest pain. Usual SOB and leg swelling. No blood in urine or stool. No dietary changes. No changes in medication/OTC agents/Herbals. States was on an antibiotic 2 weeks ago for an ear infection. Does not know which one. No change in alcohol use or tobacco use. No change in activity level. Patient denies headaches/dizziness/falls. Sometimes gets a little lightheaded after dialysis. No vomiting/diarrhea or acute illness. No procedures scheduled in the future at this time. Assessment:   Lab Results   Component Value Date    INR 2.20 (H) 04/29/2019    INR 2.50 (H) 03/25/2019    INR 3.00 (H) 02/25/2019     INR therapeutic   Recent Labs     04/29/19  1132   INR 2.20*       Plan: POCT INR ordered and result reviewed. Continue Coumadin 6 mg po daily. Recheck INR in 6 weeks. Patient reminded to call the Anticoagulation Clinic with any signs or symptoms of bleeding or with any medication changes. Patient given instructions utilizing the teach back method. Discharged via wheelchair in no apparent distress with wife. After visit summary printed and reviewed with patient.       Medications reviewed and updated on home medication list Yes    Influenza vaccine:     [] given    [] declined   [x] received previously   [] plans to receive at a later time   [] refused    [x] documented in EPIC

## 2019-05-01 ENCOUNTER — OFFICE VISIT (OUTPATIENT)
Dept: PULMONOLOGY | Age: 84
End: 2019-05-01
Payer: MEDICARE

## 2019-05-01 VITALS
DIASTOLIC BLOOD PRESSURE: 68 MMHG | HEIGHT: 70 IN | HEART RATE: 70 BPM | OXYGEN SATURATION: 94 % | BODY MASS INDEX: 45.1 KG/M2 | WEIGHT: 315 LBS | RESPIRATION RATE: 22 BRPM | SYSTOLIC BLOOD PRESSURE: 132 MMHG

## 2019-05-01 DIAGNOSIS — I27.82 OTHER CHRONIC PULMONARY EMBOLISM WITHOUT ACUTE COR PULMONALE (HCC): ICD-10-CM

## 2019-05-01 DIAGNOSIS — G47.33 OSA (OBSTRUCTIVE SLEEP APNEA): ICD-10-CM

## 2019-05-01 DIAGNOSIS — E66.01 MORBID OBESITY WITH BMI OF 50.0-59.9, ADULT (HCC): ICD-10-CM

## 2019-05-01 DIAGNOSIS — J96.10 CHRONIC RESPIRATORY FAILURE, UNSPECIFIED WHETHER WITH HYPOXIA OR HYPERCAPNIA (HCC): Primary | ICD-10-CM

## 2019-05-01 PROCEDURE — 1123F ACP DISCUSS/DSCN MKR DOCD: CPT | Performed by: INTERNAL MEDICINE

## 2019-05-01 PROCEDURE — G8598 ASA/ANTIPLAT THER USED: HCPCS | Performed by: INTERNAL MEDICINE

## 2019-05-01 PROCEDURE — 99204 OFFICE O/P NEW MOD 45 MIN: CPT | Performed by: INTERNAL MEDICINE

## 2019-05-01 PROCEDURE — G8427 DOCREV CUR MEDS BY ELIG CLIN: HCPCS | Performed by: INTERNAL MEDICINE

## 2019-05-01 PROCEDURE — 4040F PNEUMOC VAC/ADMIN/RCVD: CPT | Performed by: INTERNAL MEDICINE

## 2019-05-01 PROCEDURE — 94618 PULMONARY STRESS TESTING: CPT | Performed by: INTERNAL MEDICINE

## 2019-05-01 PROCEDURE — 1036F TOBACCO NON-USER: CPT | Performed by: INTERNAL MEDICINE

## 2019-05-01 PROCEDURE — G8417 CALC BMI ABV UP PARAM F/U: HCPCS | Performed by: INTERNAL MEDICINE

## 2019-05-01 ASSESSMENT — ENCOUNTER SYMPTOMS
WHEEZING: 0
APNEA: 0
EYES NEGATIVE: 1
CHEST TIGHTNESS: 0
ABDOMINAL DISTENTION: 0
STRIDOR: 0
COUGH: 0
ANAL BLEEDING: 0
ABDOMINAL PAIN: 0
SHORTNESS OF BREATH: 1
CHOKING: 0
BACK PAIN: 1

## 2019-05-01 NOTE — PROGRESS NOTES
Subjective:      Patient ID: Davida Archibald is a 80 y.o. male. 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 HENT: Negative. Eyes: Negative. Respiratory: Positive for shortness of breath. Negative for apnea, cough, choking, chest tightness, wheezing and stridor. Cardiovascular: Positive for palpitations and leg swelling. Negative for chest pain. Gastrointestinal: Negative for abdominal distention, abdominal pain and anal bleeding. Endocrine: Negative. Musculoskeletal: Positive for arthralgias, back pain, gait problem, joint swelling, neck pain and neck stiffness. Skin: Negative. Allergic/Immunologic: Negative for environmental allergies, food allergies and immunocompromised state. Neurological: Negative for dizziness, seizures, facial asymmetry and speech difficulty. Hematological: Negative. Psychiatric/Behavioral: Negative.           All other systems reviewed and are negative    Lung Nodule Screening     [] Qualifies    [] Does not qualify   [] Declined   [] Completed  Past Medical History:   Diagnosis Date    Acute on chronic combined systolic and diastolic congestive heart failure (HCC)     Aortic stenosis     Atrial fibrillation ([facility] 1/25/2017    Atrial flutter ([facility] 1/25/2017    COPD (chronic obstructive pulmonary disease) ([facility]     Coronary artery disease     DM (diabetes mellitus), type 2 with renal complications ([facility]     DVT (deep venous thrombosis) ([facility]     Hemodialysis patient ([facility] 10/22/2016    [facility]    Hyperlipidemia     Hypertension     Morbid obesity with BMI of 50.0-59.9, adult ([facility]     Obstructive sleep apnea     On home oxygen therapy 2013    Osteoarthritis     Pacemaker     [place] dual pacer    Prostate enlargement      Past Surgical History:   Procedure Laterality Date    AORTIC VALVE REPLACEMENT  2/3/2010    tissue valve    CARDIAC CATHETERIZATION  1 20 2010    Severe AS. Mild MV stenosis. Normal coronary arteries. Normal LV systolic function. EF 70%. Moderate concentric LVH. SPAP 47/20.  CARDIAC CATHETERIZATION  8 12 2009    Transvenous dual chamber permanent pacemaer implantation.  CARDIAC CATHETERIZATION  11 16 2007    Normal coronary arterties. EF 65%. LVH. Mild to moderate AS w/ AV area of 1.47 squared. SPAP 45/19    CARDIOVASCULAR STRESS TEST  7 15 2009    Sinus rhythm w/ average heart rate of 60 bpm. Borderline to mild SC interval prolongation throughout majority of recording. Increased QRS duration compatible w/ right BBB. Intermittent episodes of Mobitz type 2 secondary AV block, manifested as non-conducted sinus impulses which were not premature. Intermittent episodes of high-grade AV block terminated by an idioventricular escape rhythm, 30bpm.    CARDIOVASCULAR STRESS TEST  11 09 2007    Normal stress test. HTN.  DM type 2.     COLONOSCOPY      DIALYSIS FISTULA CREATION  07/27/11    Rt Wrist    EYE SURGERY      OTHER SURGICAL HISTORY  09/13/2016    FIBEROPTIC BRONCHOSCOPY    PACEMAKER PLACEMENT      SC COLONOSCOPY FLX DX W/COLLJ SPEC WHEN PFRMD N/A 9/7/2018    COLONOSCOPY performed by Racquel Delarosa MD at [facility]    SC EGD TRANSORAL BIOPSY SINGLE/MULTIPLE N/A 9/7/2018    EGD performed by Oscar Woo Keegan Villalpando MD at [facility]    TONSILLECTOMY  6yrs old   [address] ECHOCARDIOGRAM  2009    Severely dilated LV w/ moderate LVH. EF 55-56.4%. Mildly dilated RV w/ normal systolic function. Grade 2 diastolic dysfunction w/ severely elevated LA pressure. Severe calcific AS. No AI. Mild MV stenosis w/ mild to moderate MR. Mild TR w/ mildly elevated RVSP, 40-45mmHg. Moderately dilated La dn RA.  TRANSTHORACIC ECHOCARDIOGRAM  2007    Technically limited d/t body habitus. Preserved systolic function of LV w/ diffuse LVH. EF 50-55%. Moderate to moderately severe AS. Trace AI. Slight enlargement of LA, trace MR. RA and RV normal contractility. Trace TR. Pulmonary vein and pulmonary valve were not visualized very well, neither aortic arch.      UPPER GASTROINTESTINAL ENDOSCOPY       Social History     Tobacco Use    Smoking status: Former Smoker     Years: 20.00     Types: Pipe, Cigars     Last attempt to quit: 1993     Years since quittin.2    Smokeless tobacco: Former User     Types: Chew   Substance Use Topics    Alcohol use: No    Drug use: No      No Known Allergies   Family History   Problem Relation Age of Onset    Diabetes Father     Diabetes Sister     Diabetes Brother      Current Outpatient Medications   Medication Sig Dispense Refill    ammonium lactate (AMLACTIN) 12 % cream APPLY TO AFFECTED AREA 2 TIMES DAILY  11    B Complex-C-Folic Acid (ALBAN CAPS) 1 MG CAPS TAKE ONE CAPSULE BY MOUTH EVERY DAY  11    atorvastatin (LIPITOR) 40 MG tablet Take 1 tablet by mouth daily 90 tablet 1    Insulin Pen Needle (B-D UF III MINI PEN NEEDLES) 31G X 5 MM MISC 1 each by Does not apply route 4 times daily 300 each 1    midodrine (PROAMATINE) 10 MG tablet TAKE 1 TABLET BY MOUTH 3 TIMES DAILY (WITH MEALS) 270 tablet 1    ONETOUCH VERIO strip TEST BLOOD SUGAR 3 TIMES A DAY E11.9 300 strip 3    LANTUS SOLOSTAR 100 UNIT/ML injection pen INJECT 50 UNITS AT BEDTIME 15 pen 1    tamsulosin (FLOMAX) 0.4 MG capsule TAKE 1 CAPSULE BY MOUTH DAILY 90 capsule 1    warfarin (COUMADIN) 3 MG tablet Take as directed by [facility]. 200 tablets for 90 days 200 tablet 3    NOVOLOG FLEXPEN 100 UNIT/ML injection pen 140-199=3UNITS,200-249=6U,250-299=9U,300-349=12U,350-400=14U,401-425=18U,>425 CALL MD 15 pen 1    acetaminophen (TYLENOL 8 HOUR) 650 MG extended release tablet Take 650 mg by mouth every 4 hours Don't take more than 3,000 mg each day.  isosorbide mononitrate (IMDUR) 30 MG extended release tablet Take 1 tablet by mouth daily 90 tablet 1    fludrocortisone (FLORINEF) 0.1 MG tablet Take 1 tablet by mouth daily 90 tablet 1    fluticasone (FLONASE) 50 MCG/ACT nasal spray USE 2 SPRAYS IN EACH NOSTRIL TWICE A DAY 1 Bottle 2    metoprolol tartrate (LOPRESSOR) 25 MG tablet Take 0.5 tablets by mouth Daily 60 tablet 5    Ferric Citrate (AURYXIA) 1  MG(Fe) TABS Take 1 tablet by mouth 3 times daily (Patient taking differently: Take 2 tablets by mouth 3 times daily ) 270 tablet 1    aspirin 81 MG chewable tablet Take 1 tablet by mouth daily 100 tablet 1    timolol (TIMOPTIC) 0.5 % ophthalmic solution Place 1 drop into both eyes 2 times daily Indications: Disease of the Eye 15 mL 1    OXYGEN by Nasal route See Admin Instructions Indications: Oxygen Therapy      warfarin (COUMADIN) 2.5 MG tablet Indications: Anticoagulant Therapy, Atrial Fibrillation, Blockage of Blood Vessel to Lung by a Particle Take as directed by [facility]. No current facility-administered medications for this visit.       LABS - none    /68 (Site: Left Lower Arm, Position: Sitting, Cuff Size: Medium Adult)   Pulse 70   Resp 22   Ht 5' 10\" (1.778 m)   Wt (!) 349 lb (158.3 kg)   SpO2 94% Comment: on RA  BMI 50.08 kg/m²    Wt Readings from Last 3 Encounters:   05/01/19 (!) 349 lb (158.3 kg)   04/16/19 (!) 348 lb 9.6 oz (158.1 kg) it with activity and to sleep at 2 lpm flow            PSG:          ECHO:      Conclusions      Summary   Normally functioning bioprosthetic valve in aortic position.   Ejection fraction is visually estimated at 50%.   There was mild global hypokinesis of the left ventricle.   The aortic valve leaflets were not well visualized.   Normally functioning bioprosthetic valve in aortic position.   Thickened aortic valve leaflets noted.   Aortic valve leaflets are Moderately calcified.   Myxomatotic degeneration of mitral valve.   Calcification of the mitral valve noted.   Decreased mitral valve mobility noted.   Mild mitral regurgitation is present.      Signature      ----------------------------------------------------------------   Electronically signed by Shanita Tavarez MD (Interpreting   physician) on 08/22/2018 at 05:42 PM        Assessment:       Diagnosis Orders   1. Chronic respiratory failure, unspecified whether with hypoxia or hypercapnia ([facility]  Full PFT Study With Bronchodilator   2. KELSEY (obstructive sleep apnea)  6 Minute Walk Test    [address] [place] [address]    Full PFT Study With Bronchodilator    6 Minute Walk Test   3. Morbid obesity with BMI of 50.0-59.9, adult ([address]  Full PFT Study With Bronchodilator   4.  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

## 2019-05-03 RX ORDER — ISOSORBIDE MONONITRATE 30 MG/1
30 TABLET, EXTENDED RELEASE ORAL DAILY
Qty: 90 TABLET | Refills: 1 | Status: SHIPPED | OUTPATIENT
Start: 2019-05-03 | End: 2019-05-20 | Stop reason: SDUPTHER

## 2019-05-03 NOTE — TELEPHONE ENCOUNTER
Date of last visit:  4/16/2019  Date of next visit:  7/18/2019    Requested Prescriptions     Pending Prescriptions Disp Refills    isosorbide mononitrate (IMDUR) 30 MG extended release tablet [Pharmacy Med Name: ISOSORBIDE MONONIT ER 30 MG TB] 90 tablet 1     Sig: TAKE 1 TABLET BY MOUTH DAILY

## 2019-05-13 NOTE — TELEPHONE ENCOUNTER
Date of last visit:  4/16/2019  Date of next visit:  7/18/2019    Requested Prescriptions     Pending Prescriptions Disp Refills    fluticasone (FLONASE) 50 MCG/ACT nasal spray [Pharmacy Med Name: FLUTICASONE PROP 50 MCG SPRAY]  2     Sig: USE 2 SPRAYS IN EACH NOSTRIL TWICE A DAY

## 2019-05-14 RX ORDER — FLUTICASONE PROPIONATE 50 MCG
SPRAY, SUSPENSION (ML) NASAL
Qty: 1 BOTTLE | Refills: 2 | Status: SHIPPED | OUTPATIENT
Start: 2019-05-14 | End: 2019-05-20 | Stop reason: SDUPTHER

## 2019-05-20 ENCOUNTER — OFFICE VISIT (OUTPATIENT)
Dept: ENT CLINIC | Age: 84
End: 2019-05-20
Payer: MEDICARE

## 2019-05-20 VITALS
RESPIRATION RATE: 20 BRPM | BODY MASS INDEX: 45.1 KG/M2 | DIASTOLIC BLOOD PRESSURE: 60 MMHG | HEART RATE: 60 BPM | WEIGHT: 315 LBS | HEIGHT: 70 IN | SYSTOLIC BLOOD PRESSURE: 138 MMHG | TEMPERATURE: 98.4 F

## 2019-05-20 DIAGNOSIS — H91.93 BILATERAL HEARING LOSS, UNSPECIFIED HEARING LOSS TYPE: Primary | ICD-10-CM

## 2019-05-20 PROCEDURE — 1123F ACP DISCUSS/DSCN MKR DOCD: CPT | Performed by: OTOLARYNGOLOGY

## 2019-05-20 PROCEDURE — G8417 CALC BMI ABV UP PARAM F/U: HCPCS | Performed by: OTOLARYNGOLOGY

## 2019-05-20 PROCEDURE — 1036F TOBACCO NON-USER: CPT | Performed by: OTOLARYNGOLOGY

## 2019-05-20 PROCEDURE — G8598 ASA/ANTIPLAT THER USED: HCPCS | Performed by: OTOLARYNGOLOGY

## 2019-05-20 PROCEDURE — 4040F PNEUMOC VAC/ADMIN/RCVD: CPT | Performed by: OTOLARYNGOLOGY

## 2019-05-20 PROCEDURE — G8427 DOCREV CUR MEDS BY ELIG CLIN: HCPCS | Performed by: OTOLARYNGOLOGY

## 2019-05-20 PROCEDURE — 99203 OFFICE O/P NEW LOW 30 MIN: CPT | Performed by: OTOLARYNGOLOGY

## 2019-05-20 RX ORDER — MIDODRINE HYDROCHLORIDE 5 MG/1
TABLET ORAL
Refills: 3 | COMMUNITY
Start: 2019-05-09 | End: 2019-07-15

## 2019-05-20 ASSESSMENT — ENCOUNTER SYMPTOMS
SINUS PRESSURE: 0
SHORTNESS OF BREATH: 0
TROUBLE SWALLOWING: 0
VOICE CHANGE: 0
FACIAL SWELLING: 0
NAUSEA: 0
SINUS PAIN: 0
STRIDOR: 0
RHINORRHEA: 0
COUGH: 0
VOMITING: 0
SORE THROAT: 0

## 2019-05-20 NOTE — PROGRESS NOTES
[address], NOSE AND THROAT  [facility]  [facility] [address] [address]  Dept: [phone]  Dept Fax: [phone]  Loc: [phone]    Felix Wong is a 80 y.o. male who was referred Olivia Hernandez MD for:  Chief Complaint   Patient presents with    New Patient     new patient ref by Dr. Fransisco Rubin, bilateral hearing loss ,    .    HPI:     Felix Wong is a 80 y.o. male who presents today for bilateral right worse than left hearing loss. He reports that over the last 1-2 months it has progressively worsened but is not sudden in onset. He saw his primary care physician and was given a week of antibiotics for presumed ear infection, which did not help with the hearing loss. He denies otalgia, otorrhea, tinnitus or vertigo. No prior ear disease or surgeries. No exposure to loud noises. No family history of early hearing loss. No history of ototoxic medications. He does find it difficult to understand speech and loud environments. History:     No Known Allergies  Current Outpatient Medications   Medication Sig Dispense Refill    midodrine (PROAMATINE) 5 MG tablet TAKE 1 TABLET BY MOUTH 3 TIMES A DAY.   3    ammonium lactate (AMLACTIN) 12 % cream APPLY TO AFFECTED AREA 2 TIMES DAILY  11    B Complex-C-Folic Acid (ALBAN CAPS) 1 MG CAPS TAKE ONE CAPSULE BY MOUTH EVERY DAY  11    atorvastatin (LIPITOR) 40 MG tablet Take 1 tablet by mouth daily 90 tablet 1    Insulin Pen Needle (B-D UF III MINI PEN NEEDLES) 31G X 5 MM MISC 1 each by Does not apply route 4 times daily 300 each 1    midodrine (PROAMATINE) 10 MG tablet TAKE 1 TABLET BY MOUTH 3 TIMES DAILY (WITH MEALS) 270 tablet 1    ONETOUCH VERIO strip TEST BLOOD SUGAR 3 TIMES A DAY E11.9 300 strip 3    LANTUS SOLOSTAR 100 UNIT/ML injection pen INJECT 50 UNITS AT BEDTIME 15 pen 1    tamsulosin (FLOMAX) 0.4 MG capsule TAKE 1 CAPSULE BY MOUTH DAILY 90 capsule 1    warfarin (COUMADIN) 3 MG tablet Take as directed by [facility]. 200 tablets for 90 days 200 tablet 3    NOVOLOG FLEXPEN 100 UNIT/ML injection pen 140-199=3UNITS,200-249=6U,250-299=9U,300-349=12U,350-400=14U,401-425=18U,>425 CALL MD 15 pen 1    acetaminophen (TYLENOL 8 HOUR) 650 MG extended release tablet Take 650 mg by mouth every 4 hours Don't take more than 3,000 mg each day.  isosorbide mononitrate (IMDUR) 30 MG extended release tablet Take 1 tablet by mouth daily 90 tablet 1    fludrocortisone (FLORINEF) 0.1 MG tablet Take 1 tablet by mouth daily 90 tablet 1    fluticasone (FLONASE) 50 MCG/ACT nasal spray USE 2 SPRAYS IN EACH NOSTRIL TWICE A DAY 1 Bottle 2    metoprolol tartrate (LOPRESSOR) 25 MG tablet Take 0.5 tablets by mouth Daily 60 tablet 5    Ferric Citrate (AURYXIA) 1  MG(Fe) TABS Take 1 tablet by mouth 3 times daily (Patient taking differently: Take 2 tablets by mouth 3 times daily ) 270 tablet 1    aspirin 81 MG chewable tablet Take 1 tablet by mouth daily 100 tablet 1    timolol (TIMOPTIC) 0.5 % ophthalmic solution Place 1 drop into both eyes 2 times daily Indications: Disease of the Eye 15 mL 1    OXYGEN by Nasal route See Admin Instructions Indications: Oxygen Therapy      warfarin (COUMADIN) 2.5 MG tablet Indications: Anticoagulant Therapy, Atrial Fibrillation, Blockage of Blood Vessel to Lung by a Particle Take as directed by [facility]. No current facility-administered medications for this visit.       Past Medical History:   Diagnosis Date    Acute on chronic combined systolic and diastolic congestive heart failure (HCC)     Aortic stenosis     Atrial fibrillation ([facility] 1/25/2017    Atrial flutter ([facility] 1/25/2017    COPD (chronic obstructive pulmonary disease) ([facility]     Coronary artery disease     DM (diabetes mellitus), type 2 with renal complications ([facility] [facility]     DVT (deep venous thrombosis) ([facility] [facility]     Hemodialysis patient ([facility] [address] 10/22/2016    with Kidney function. Grade 2 diastolic dysfunction w/ severely elevated LA pressure. Severe calcific AS. No AI. Mild MV stenosis w/ mild to moderate MR. Mild TR w/ mildly elevated RVSP, 40-45mmHg. Moderately dilated La dn RA.  TRANSTHORACIC ECHOCARDIOGRAM  2007    Technically limited d/t body habitus. Preserved systolic function of LV w/ diffuse LVH. EF 50-55%. Moderate to moderately severe AS. Trace AI. Slight enlargement of LA, trace MR. RA and RV normal contractility. Trace TR. Pulmonary vein and pulmonary valve were not visualized very well, neither aortic arch.  UPPER GASTROINTESTINAL ENDOSCOPY       Family History   Problem Relation Age of Onset    Diabetes Father     Diabetes Sister     Diabetes Brother      Social History     Tobacco Use    Smoking status: Former Smoker     Years: 20.00     Types: Pipe, Cigars     Last attempt to quit: 1993     Years since quittin.2    Smokeless tobacco: Former User     Types: Chew   Substance Use Topics    Alcohol use: No       Subjective:      Review of Systems   Constitutional: Negative. HENT: Positive for hearing loss. Negative for congestion, ear discharge, ear pain, facial swelling, mouth sores, nosebleeds, postnasal drip, rhinorrhea, sinus pressure, sinus pain, sneezing, sore throat, tinnitus, trouble swallowing and voice change. Eyes: Negative for visual disturbance. Respiratory: Negative for cough, shortness of breath and stridor. Cardiovascular: Negative for chest pain. Gastrointestinal: Negative for nausea and vomiting. Musculoskeletal: Negative for neck pain. Skin: Negative for wound. Allergic/Immunologic: Negative for environmental allergies. Neurological: Negative for dizziness. All other systems reviewed and are negative. Rest of review of systems are negative, except as noted in HPI.      Objective:   /60   Pulse 60   Temp 98.4 °F (36.9 °C) (Oral)   Resp 20   Ht 5' 10\" (1.778 m)   Wt (!) 350 lb (158.8 kg) BMI 50.22 kg/m²     PHYSICAL EXAM  Constitutional: He is oriented to person, place, and time. He appears well-developed and well-nourished. No distress. HENT:   Head: Normocephalic and atraumatic. Right Ear: External ear normal. Ear canal clear. Tympanic membrane intact. Middle ear aerated. Left Ear: External ear normal. Ear canal clear. Tympanic membrane intact. Middle ear aerated. Nose: External nose normal.  Nasal mucosa normal.  No lesions noted. Mouth/Throat: Fair dentition. Oral cavity mucosa normal, no masses or lesions noted. Oropharynx is clear and moist.   Eyes: Pupils are equal, round, and reactive to light. Conjunctivae and EOM are normal.   Neck: Normal range of motion. Neck supple. No JVD present. No tracheal deviation present. No thyromegaly present. No cervical lymphadenopathy noted. Cardiovascular: Normal rate. Pulmonary/Chest: Effort normal. No stridor or stertor. No respiratory distress. Musculoskeletal: Normal range of motion. He exhibits no edema or Lymphadenopathy. Neurological: He is alert and oriented to person, place, and time. Cranial nerve II-XII grossly intact. Skin: Skin is warm. No erythema. Psychiatric: Normal mood and affect. Behavior is normal.   Vitals reviewed. Data:  All of the past medical history, past surgical history, family history,social history, allergies and current medications were reviewed with the patient. Assessment & Plan   Diagnoses and all orders for this visit:     Diagnosis Orders   1. Bilateral hearing loss, unspecified hearing loss type  Audiometry with tympanometry    Hearing aid biaural evaluation     80-year-old male with progressively worsening hearing loss. He feels that his right ear is worse than his left, however he is not sure of this. The hearing loss was not sudden in onset and was not preceded by an upper respiratory infection.   Ear exam today was normal.  I deferred tuning fork exam.  I will have him obtain a hearing test.  If this shows a symmetric sensorineural hearing loss then he could be evaluated for hearing aids. If this shows a major conductive component or asymmetry, I will have him follow up with me or our NP/PA. Findings were explained and his questions were answered. No follow-ups on file. Leopoldo Alvine, MD    **This report has been created using voice recognition software. It may contain minor errors which are inherent in voice recognition technology. **

## 2019-05-24 ENCOUNTER — HOSPITAL ENCOUNTER (OUTPATIENT)
Dept: AUDIOLOGY | Age: 84
Discharge: HOME | End: 2019-05-24
Payer: MEDICARE

## 2019-05-24 PROCEDURE — 92567 TYMPANOMETRY: CPT | Performed by: AUDIOLOGIST

## 2019-05-24 PROCEDURE — 92557 COMPREHENSIVE HEARING TEST: CPT | Performed by: AUDIOLOGIST

## 2019-05-28 NOTE — PROGRESS NOTES
DIAGNOSIS: H90.3    HEARING AID EVALUATION: Discussed hearing aid options with the patient. Reviewed styles and technology. Decided on bilateral Loop App [id number] ITC hearing aids for each ear in chestnut brown. The patient will be contacted to schedule his hearing aid fitting.

## 2019-05-28 NOTE — PROGRESS NOTES
ACCOUNT #: [de-identified]      AUDIOLOGICAL EVALUATION      REASON FOR TESTING:  The patient is reporting increased hearing loss in his right ear for about 1 month. He did receive treatment of a recent ear infection in his right ear. He denies otalgia, tinnitus and fullness. He denies a history of noise exposure. Other significant health history includes diabetes and kidney disease that requires dialysis. OTOSCOPY: Unremarkable in each ear. AUDIOGRAM        Reliability: Good  Audiometer Used:  [id number]    PURE TONES     RE    LE     []   [] WNL        [x]   [x] Mild    [x]   [x] Moderate       [x]   [] Mod-Severe   [x]   [x] Severe    []   [] Profound    TYPE     RE    LE    [x]   [x] SNHL    []   []  Conductive HL    []   [] Mixed HL      CONFIGURATION    RE    LE    []   [] Essentially Flat     [x]   [x]  Sloping  []   [] Steeply Sloping  []   []  Rising  []   [] Cookie Bite    SPEECH AUDIOMETRY   Right Left Sound Field Aided   PTA 48 dB 51 dB     SRT 35 dB 40 dB     SAT       MASKING       % WRS   QUIET 92% 88%      40 dBSL 40 dBSL     %WRS   NOISE              [facility]            Live Voice  [x]     Recorded  []     List   []     WORD RECOGNITION   RE    LE  []   []  Excellent    [x]   [x]  Good  []   [] Fair  []   [] Poor  []   [] Very Poor    TYMPANOGRAMS  RE    LE  []   []  Normal compliance    [x]   [x]  Reduced mobility  []   [] Hyper mobility  []   [] Normal middle ear pressure  []   [] Negative middle ear pressure  []   [] Positive middle ear pressure  []   [] Flat w/normal ECV  []   [] Flat w/large ECV  []   [] Patent PE tube  []   [] Non-Patent PE tube  []   [] Could Not Test    DISTORTION PRODUCT OTOACOUSTIC EMISSIONS SCREENING    Right Ear     [] Passed     [] Refer     [x] Did Not Test  Left Ear        [] Passed     [] Refer     [x] Did Not Test      COMMENTS: 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 Abnormal middle ear function, bilaterally. RECOMMENDATION(S): 1- Continue care with Dr. Gunner Winters as scheduled. 2- Repeat audiogram and tympanogram if any changes are noted in hearing ability. 3- Bilateral amplification is recommended pending medical clearance.

## 2019-06-03 ENCOUNTER — HOSPITAL ENCOUNTER (OUTPATIENT)
Dept: PHARMACY | Age: 84
Setting detail: THERAPIES SERIES
Discharge: HOME | End: 2019-06-03
Payer: MEDICARE

## 2019-06-03 ENCOUNTER — INITIAL CONSULT (OUTPATIENT)
Dept: PULMONOLOGY | Age: 84
End: 2019-06-03
Payer: MEDICARE

## 2019-06-03 VITALS
SYSTOLIC BLOOD PRESSURE: 136 MMHG | HEART RATE: 69 BPM | OXYGEN SATURATION: 92 % | HEIGHT: 70 IN | WEIGHT: 315 LBS | DIASTOLIC BLOOD PRESSURE: 72 MMHG | BODY MASS INDEX: 45.1 KG/M2

## 2019-06-03 DIAGNOSIS — I48.0 PAROXYSMAL ATRIAL FIBRILLATION (HCC): ICD-10-CM

## 2019-06-03 DIAGNOSIS — I27.82 OTHER CHRONIC PULMONARY EMBOLISM WITHOUT ACUTE COR PULMONALE (HCC): ICD-10-CM

## 2019-06-03 DIAGNOSIS — J96.12 CHRONIC RESPIRATORY FAILURE WITH HYPOXIA AND HYPERCAPNIA (HCC): ICD-10-CM

## 2019-06-03 DIAGNOSIS — J96.11 CHRONIC RESPIRATORY FAILURE WITH HYPOXIA AND HYPERCAPNIA (HCC): ICD-10-CM

## 2019-06-03 DIAGNOSIS — G47.33 OSA (OBSTRUCTIVE SLEEP APNEA): Primary | ICD-10-CM

## 2019-06-03 LAB — POC INR: 2.2 (ref 0.8–1.2)

## 2019-06-03 PROCEDURE — 1123F ACP DISCUSS/DSCN MKR DOCD: CPT | Performed by: INTERNAL MEDICINE

## 2019-06-03 PROCEDURE — G8417 CALC BMI ABV UP PARAM F/U: HCPCS | Performed by: INTERNAL MEDICINE

## 2019-06-03 PROCEDURE — G8598 ASA/ANTIPLAT THER USED: HCPCS | Performed by: INTERNAL MEDICINE

## 2019-06-03 PROCEDURE — 1036F TOBACCO NON-USER: CPT | Performed by: INTERNAL MEDICINE

## 2019-06-03 PROCEDURE — 85610 PROTHROMBIN TIME: CPT

## 2019-06-03 PROCEDURE — 99213 OFFICE O/P EST LOW 20 MIN: CPT | Performed by: INTERNAL MEDICINE

## 2019-06-03 PROCEDURE — 4040F PNEUMOC VAC/ADMIN/RCVD: CPT | Performed by: INTERNAL MEDICINE

## 2019-06-03 PROCEDURE — G8427 DOCREV CUR MEDS BY ELIG CLIN: HCPCS | Performed by: INTERNAL MEDICINE

## 2019-06-03 PROCEDURE — 99211 OFF/OP EST MAY X REQ PHY/QHP: CPT

## 2019-06-03 PROCEDURE — 36416 COLLJ CAPILLARY BLOOD SPEC: CPT

## 2019-06-03 NOTE — PROGRESS NOTES
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 Gets oxygen from [address] from [facility]. No D/L available, claims nightly use, keeps Biapap from 10 pm to 8:30 am  Uses nasal mask, feels refreshed when wakes up. Carlie Blackwell, did not bring for D/L  Respiratory failure on supplemental oxygen due to MO and likely COPD---PFTs pending  Takes a nap after dialysis      Naps:  Any naps? Yes after dialysis       Driving History:  Does not drive    Weight:BMI 48      Past Medical History:   Diagnosis Date    Acute on chronic combined systolic and diastolic congestive heart failure (HCC)     Aortic stenosis     Atrial fibrillation (HCC) 1/25/2017    Atrial flutter ([facility] 1/25/2017    COPD (chronic obstructive pulmonary disease) (HCC)     Coronary artery disease     DM (diabetes mellitus), type 2 with renal complications ([address]     DVT (deep venous thrombosis) ([facility] [facility]     Hemodialysis patient ([facility] [address] 10/22/2016    [facility]    Hyperlipidemia     Hypertension     Morbid obesity with BMI of 50.0-59.9, adult ([address]     Obstructive sleep apnea     On home oxygen therapy 2013    Osteoarthritis     Pacemaker     [employer] dual pacer    Prostate enlargement        Past Surgical History:   Procedure Laterality Date    AORTIC VALVE REPLACEMENT  2/3/2010    tissue valve    CARDIAC CATHETERIZATION  1 20 2010    Severe AS. Mild MV stenosis. Normal coronary arteries. Normal LV systolic function. EF 70%. Moderate concentric LVH. SPAP 47/20.  CARDIAC CATHETERIZATION  8 12 2009    Transvenous dual chamber permanent pacemaer implantation.  CARDIAC CATHETERIZATION  11 16 2007    Normal coronary arterties. EF 65%. LVH. Mild to moderate AS w/ AV area of 1.47 squared.  SPAP 45/19    CARDIOVASCULAR STRESS TEST  7 15 2009    Sinus rhythm w/ average heart rate of 60 bpm. Borderline to mild DE interval prolongation throughout majority of recording. Increased QRS duration compatible w/ right BBB. Intermittent episodes of Mobitz type 2 secondary AV block, manifested as non-conducted sinus impulses which were not premature. Intermittent episodes of high-grade AV block terminated by an idioventricular escape rhythm, 30bpm.    CARDIOVASCULAR STRESS TEST  2007    Normal stress test. HTN. DM type 2.     COLONOSCOPY      DIALYSIS FISTULA CREATION  11    Rt Wrist    EYE SURGERY      OTHER SURGICAL HISTORY  2016    FIBEROPTIC BRONCHOSCOPY    PACEMAKER PLACEMENT      DE COLONOSCOPY FLX DX W/COLLJ SPEC WHEN PFRMD N/A 2018    COLONOSCOPY [facility]    DE EGD TRANSORAL BIOPSY SINGLE/MULTIPLE N/A 2018    EGD performed by Evan Chambers MD at [address] [place] [address] Blvd  6yrs old   [address] ECHOCARDIOGRAM  2009    Severely dilated LV w/ moderate LVH. EF 55-56.4%. Mildly dilated RV w/ normal systolic function. Grade 2 diastolic dysfunction w/ severely elevated LA pressure. Severe calcific AS. No AI. Mild MV stenosis w/ mild to moderate MR. Mild TR w/ mildly elevated RVSP, 40-45mmHg. Moderately dilated La dn RA.  TRANSTHORACIC ECHOCARDIOGRAM  2007    Technically limited d/t body habitus. Preserved systolic function of LV w/ diffuse LVH. EF 50-55%. Moderate to moderately severe AS. Trace AI. Slight enlargement of LA, trace MR. RA and RV normal contractility. Trace TR. Pulmonary vein and pulmonary valve were not visualized very well, neither aortic arch.      UPPER GASTROINTESTINAL ENDOSCOPY         Social History     Tobacco Use    Smoking status: Former Smoker     Years: 20.00     Types: Pipe, Cigars     Last attempt to quit: 1993     Years since quittin.2 270 tablet 1    aspirin 81 MG chewable tablet Take 1 tablet by mouth daily 100 tablet 1    timolol (TIMOPTIC) 0.5 % ophthalmic solution Place 1 drop into both eyes 2 times daily Indications: Disease of the Eye 15 mL 1    OXYGEN by Nasal route See Admin Instructions Indications: Oxygen Therapy      warfarin (COUMADIN) 2.5 MG tablet Indications: Anticoagulant Therapy, Atrial Fibrillation, Blockage of Blood Vessel to Lung by a Particle Take as directed by [facility]. No current facility-administered medications for this visit. Family History   Problem Relation Age of Onset    Diabetes Father     Diabetes Sister     Diabetes Brother             Physical Exam:    HEIGHTHeight: 5' 10\" (177.8 cm) WEIGHTWeight: (!) 351 lb 6.4 oz (159.4 kg)      BMI:  There is no height or weight on file to calculate BMI. Neck Size: 19  Oxygen Sat: 92    ESS:   Vitals: There were no vitals taken for this visit. Mallampati Score: 3    General appearance: alert and oriented to person, place and time and obese  Nose: Nares normal. Septum midline. Mucosa normal. No drainage or sinus tenderness. Oropharynx:  Large tongue, crowded pharynx, mallampati class 4  Lungs: clear to auscultation bilaterally  Heart: prosthetic valve  Extremities: edema 3+ and extremities normal, atraumatic, no cyanosis or edema  Neurologic: Mental status: Alert, oriented, thought content appropriate      Assessment   KELSEY  Chronic respiratory failure on supplemental oxygen  AVR  MO  COPD (likely)  MO with BMI 50  No D/L available    Plan   Needs supplies  Wife to bring SD card and Bipap today for check and D/L  RTC 3 mos  F/U pulm clinic    Mask Desensitization and Pre study teaching? No  Weight Loss Information Given? No  Sleep Hygiene Discussed?  No

## 2019-06-04 ENCOUNTER — TELEPHONE (OUTPATIENT)
Dept: ENT CLINIC | Age: 84
End: 2019-06-04

## 2019-06-04 NOTE — TELEPHONE ENCOUNTER
----- Message from Ivis Davis MD sent at 6/4/2019  9:20 AM EDT -----  Please call patient and tell him that his hearing test showed bilateral symmetric hearing loss worse in the high frequency, and that he could schedule an appointment with audiology if he wants to be evaluated for hearing aids. ----- Message -----  From: VICKIE Heart  Sent: 5/28/2019   1:01 PM  To: Ivis Davis MD

## 2019-06-10 RX ORDER — INSULIN ASPART 100 [IU]/ML
INJECTION, SOLUTION INTRAVENOUS; SUBCUTANEOUS
Qty: 45 PEN | Refills: 3 | Status: SHIPPED | OUTPATIENT
Start: 2019-06-10 | End: 2019-07-18 | Stop reason: SDUPTHER

## 2019-06-10 RX ORDER — TAMSULOSIN HYDROCHLORIDE 0.4 MG/1
0.4 CAPSULE ORAL DAILY
Qty: 90 CAPSULE | Refills: 1 | Status: SHIPPED | OUTPATIENT
Start: 2019-06-10 | End: 2019-10-22 | Stop reason: SDUPTHER

## 2019-06-10 RX ORDER — FLUDROCORTISONE ACETATE 0.1 MG/1
0.1 TABLET ORAL DAILY
Qty: 90 TABLET | Refills: 1 | Status: SHIPPED | OUTPATIENT
Start: 2019-06-10 | End: 2019-10-22 | Stop reason: SDUPTHER

## 2019-06-10 NOTE — TELEPHONE ENCOUNTER
Date of last visit:  2019  Date of next visit:  2019    Requested Prescriptions     Pending Prescriptions Disp Refills    fludrocortisone (FLORINEF) 0.1 MG tablet [Pharmacy Med Name: FLUDROCORTISONE 0.1 MG TABLET] 90 tablet 1     Sig: TAKE 1 TABLET BY MOUTH DAILY    tamsulosin (FLOMAX) 0.4 MG capsule [Pharmacy Med Name: TAMSULOSIN HCL 0.4 MG CAPSULE] 90 capsule 1     Sig: TAKE 1 CAPSULE BY MOUTH DAILY    NOVOLOG FLEXPEN 100 UNIT/ML injection pen [Pharmacy Med Name: Kenisha Pepe 100 UNIT/ML FLEXPEN]  1     Si-199=3UNITS,200-249=6U,250-299=9U,300-349=12U,350-400=14U,401-425=18U,>425 CALL MD

## 2019-06-11 ENCOUNTER — TELEPHONE (OUTPATIENT)
Dept: AUDIOLOGY | Age: 84
End: 2019-06-11

## 2019-06-11 NOTE — TELEPHONE ENCOUNTER
Please contact the patient to schedule a hearing aid fitting appointment. His hearing aids are here.   Self pay

## 2019-06-19 NOTE — TELEPHONE ENCOUNTER
Date of last visit:  4/16/2019  Date of next visit:  7/18/2019    Requested Prescriptions     Pending Prescriptions Disp Refills    LANTUS SOLOSTAR 100 UNIT/ML injection pen [Pharmacy Med Name: Gary Faust 100 UNIT/ML] 45 pen 1     Sig: INJECT 50 UNITS AT BEDTIME

## 2019-06-20 RX ORDER — INSULIN GLARGINE 100 [IU]/ML
INJECTION, SOLUTION SUBCUTANEOUS
Qty: 45 PEN | Refills: 1 | Status: SHIPPED | OUTPATIENT
Start: 2019-06-20 | End: 2019-07-18 | Stop reason: SDUPTHER

## 2019-06-26 ENCOUNTER — HOSPITAL ENCOUNTER (OUTPATIENT)
Dept: AUDIOLOGY | Age: 84
Discharge: HOME | End: 2019-06-26

## 2019-06-26 PROCEDURE — V5160 DISPENSING FEE BINAURAL: HCPCS | Performed by: AUDIOLOGIST

## 2019-06-26 PROCEDURE — V5258 HEARING AID, DIGIT, BIN, CIC: HCPCS | Performed by: AUDIOLOGIST

## 2019-06-26 NOTE — PROGRESS NOTES
[facility]#: [id number]   ACCT#: [de-identified]    DIAGNOSIS: H90.3    NEW HEARING AID FITTING: Dispensed HemoSonics i1600 CIC hearing aid(s) for the right and left ear(s). Explained care, use and insertion. Programmed. Hearing aid fitting  recheck scheduled for 07/15/2019. 16-Mar-2019 23:30

## 2019-07-03 ENCOUNTER — PROCEDURE VISIT (OUTPATIENT)
Dept: CARDIOLOGY CLINIC | Age: 84
End: 2019-07-03
Payer: MEDICARE

## 2019-07-03 DIAGNOSIS — Z95.0 PACEMAKER: Primary | ICD-10-CM

## 2019-07-03 PROCEDURE — 93294 REM INTERROG EVL PM/LDLS PM: CPT | Performed by: INTERNAL MEDICINE

## 2019-07-03 PROCEDURE — 93296 REM INTERROG EVL PM/IDS: CPT | Performed by: INTERNAL MEDICINE

## 2019-07-03 NOTE — PROGRESS NOTES
DR Fanta Castillo PT/KNOWN PAF/COUMADIN     ST THUY SINGLE PACEMAKER REMOTE  BATTERY 10.6-11.3 YRS REMAINING  VENT IMPEDENCE 530  RV WAVES 6  VVIR 70  V PACED >99%  1 HIGH V RATE EPISODE

## 2019-07-15 ENCOUNTER — HOSPITAL ENCOUNTER (OUTPATIENT)
Dept: PHARMACY | Age: 84
Setting detail: THERAPIES SERIES
Discharge: HOME | End: 2019-07-15
Payer: MEDICARE

## 2019-07-15 ENCOUNTER — HOSPITAL ENCOUNTER (OUTPATIENT)
Dept: AUDIOLOGY | Age: 84
Discharge: HOME | End: 2019-07-15

## 2019-07-15 DIAGNOSIS — I27.82 OTHER CHRONIC PULMONARY EMBOLISM WITHOUT ACUTE COR PULMONALE (HCC): ICD-10-CM

## 2019-07-15 DIAGNOSIS — I48.0 PAROXYSMAL ATRIAL FIBRILLATION (HCC): ICD-10-CM

## 2019-07-15 LAB — POC INR: 2.5 (ref 0.8–1.2)

## 2019-07-15 PROCEDURE — 99211 OFF/OP EST MAY X REQ PHY/QHP: CPT

## 2019-07-15 PROCEDURE — 85610 PROTHROMBIN TIME: CPT

## 2019-07-15 PROCEDURE — 9990000010 HC NO CHARGE VISIT: Performed by: AUDIOLOGIST

## 2019-07-15 PROCEDURE — 36416 COLLJ CAPILLARY BLOOD SPEC: CPT

## 2019-07-15 NOTE — PROGRESS NOTES
Medication Management [facility]  Anticoagulation Clinic  [phone] (phone)  [phone] (fax)      Mr. Ankit Maldonado is a 80 y.o.  male with history of paroxysmal Afib, PE who presents today for anticoagulation monitoring and adjustment. Patient verifies current dosing regimen and tablet strength. No missed or extra doses. Patient denies s/s bleeding/bruising/chest pain. Usual SOB and lower leg swelling. No blood in urine or stool. No dietary changes. No changes in medication/OTC agents/Herbals. No change in alcohol use or tobacco use. No change in activity level. Patient denies headaches/dizziness/lightheadedness/falls. No vomiting/diarrhea or acute illness. No procedures scheduled in the future at this time. Assessment:   Lab Results   Component Value Date    INR 2.50 (H) 07/15/2019    INR 2.20 (H) 06/03/2019    INR 2.20 (H) 04/29/2019     INR therapeutic   Recent Labs     07/15/19  1134   INR 2.50*     Plan: POCT INR ordered and result reviewed. Continue Coumadin 6 mg po daily. Recheck INR in 6 weeks. Patient reminded to call the Anticoagulation Clinic with any signs or symptoms of bleeding or with any medication changes. Patient given instructions utilizing the teach back method. Discharged via wheelchair in no apparent distress with granddaughter. After visit summary printed and reviewed with patient.       Medications reviewed and updated on home medication list Yes    Influenza vaccine:     [] given    [] declined   [x] received previously   [] plans to receive at a later time   [] refused    [x] documented in EPIC

## 2019-07-18 ENCOUNTER — OFFICE VISIT (OUTPATIENT)
Dept: FAMILY MEDICINE CLINIC | Age: 84
End: 2019-07-18

## 2019-07-18 VITALS
DIASTOLIC BLOOD PRESSURE: 64 MMHG | SYSTOLIC BLOOD PRESSURE: 108 MMHG | RESPIRATION RATE: 18 BRPM | HEART RATE: 70 BPM | HEIGHT: 70 IN | BODY MASS INDEX: 50.42 KG/M2

## 2019-07-18 DIAGNOSIS — I10 ESSENTIAL HYPERTENSION: ICD-10-CM

## 2019-07-18 DIAGNOSIS — Z99.2 HEMODIALYSIS PATIENT (HCC): ICD-10-CM

## 2019-07-18 DIAGNOSIS — Z79.4 TYPE 2 DIABETES MELLITUS WITH STAGE 4 CHRONIC KIDNEY DISEASE, WITH LONG-TERM CURRENT USE OF INSULIN (HCC): Primary | ICD-10-CM

## 2019-07-18 DIAGNOSIS — E11.22 TYPE 2 DIABETES MELLITUS WITH STAGE 4 CHRONIC KIDNEY DISEASE, WITH LONG-TERM CURRENT USE OF INSULIN (HCC): Primary | ICD-10-CM

## 2019-07-18 DIAGNOSIS — E78.5 HYPERLIPIDEMIA, UNSPECIFIED HYPERLIPIDEMIA TYPE: ICD-10-CM

## 2019-07-18 DIAGNOSIS — N18.4 TYPE 2 DIABETES MELLITUS WITH STAGE 4 CHRONIC KIDNEY DISEASE, WITH LONG-TERM CURRENT USE OF INSULIN (HCC): Primary | ICD-10-CM

## 2019-07-18 DIAGNOSIS — Z99.2 ESRD ON DIALYSIS (HCC): ICD-10-CM

## 2019-07-18 DIAGNOSIS — N18.6 ESRD ON DIALYSIS (HCC): ICD-10-CM

## 2019-07-18 LAB
CHP ED QC CHECK: ABNORMAL
GLUCOSE BLD-MCNC: 140 MG/DL
HBA1C MFR BLD: 7.7 %

## 2019-07-18 PROCEDURE — 4040F PNEUMOC VAC/ADMIN/RCVD: CPT | Performed by: EMERGENCY MEDICINE

## 2019-07-18 PROCEDURE — G8417 CALC BMI ABV UP PARAM F/U: HCPCS | Performed by: EMERGENCY MEDICINE

## 2019-07-18 PROCEDURE — 83036 HEMOGLOBIN GLYCOSYLATED A1C: CPT | Performed by: EMERGENCY MEDICINE

## 2019-07-18 PROCEDURE — 99213 OFFICE O/P EST LOW 20 MIN: CPT | Performed by: EMERGENCY MEDICINE

## 2019-07-18 PROCEDURE — 1036F TOBACCO NON-USER: CPT | Performed by: EMERGENCY MEDICINE

## 2019-07-18 PROCEDURE — 1123F ACP DISCUSS/DSCN MKR DOCD: CPT | Performed by: EMERGENCY MEDICINE

## 2019-07-18 PROCEDURE — G8427 DOCREV CUR MEDS BY ELIG CLIN: HCPCS | Performed by: EMERGENCY MEDICINE

## 2019-07-18 PROCEDURE — 82962 GLUCOSE BLOOD TEST: CPT | Performed by: EMERGENCY MEDICINE

## 2019-07-18 PROCEDURE — G8598 ASA/ANTIPLAT THER USED: HCPCS | Performed by: EMERGENCY MEDICINE

## 2019-07-18 RX ORDER — FLUTICASONE PROPIONATE 50 MCG
2 SPRAY, SUSPENSION (ML) NASAL DAILY
Qty: 1 BOTTLE | Refills: 2 | Status: SHIPPED | OUTPATIENT
Start: 2019-07-18 | End: 2019-10-11 | Stop reason: SDUPTHER

## 2019-07-18 ASSESSMENT — ENCOUNTER SYMPTOMS
RHINORRHEA: 0
NAUSEA: 0
VOMITING: 0
CHEST TIGHTNESS: 0
BACK PAIN: 0
ABDOMINAL PAIN: 0
COUGH: 0
VISUAL CHANGE: 0
SHORTNESS OF BREATH: 1
TROUBLE SWALLOWING: 0
DIARRHEA: 0
SORE THROAT: 0
CONSTIPATION: 0
SINUS PRESSURE: 0
VOICE CHANGE: 0
WHEEZING: 0

## 2019-07-18 NOTE — PROGRESS NOTES
BP Readings from Last 2 Encounters:   07/18/19 108/64   06/03/19 136/72     Hemoglobin A1C (%)   Date Value   02/21/2019 8.3     LDL Calculated (mg/dL)   Date Value   07/09/2018 151              Tobacco use:  Patient  reports that he quit smoking about 26 years ago. His smoking use included pipe and cigars. He quit after 20.00 years of use. He has quit using smokeless tobacco.  His smokeless tobacco use included chew. If Smoker - Cessation materials given? NA    Is Daily aspirin on medication list?:  Yes    Diabetic retinal exam done? Yes   If yes, document in Health Maintenance. Monofilament placed on counter? No    Shoes and socks removed? No    BP taken with correct size cuff? Yes   Repeated if > 140/90 NA     Is patient taking any medications for diabetes    Yes   If yes, see medication list    Is the patient reporting any side effects of diabetic medications? No    Microalbumin performed if applicable? No      Is patient taking any over the counter medications    Yes   If yes, see medication list      Patient Self-Management Goal for Chronic Condition  Goal: I will take all medications as prescribed by my doctor, and I will call the office if I am having any medication problems. Barriers to success: none  Plan for overcoming my barriers: N/A     Confidence: 10/10  Date goal set: 7/18/19  Date goal attained:     Have you seen any other physician or provider since your last visit no    Have you had any other diagnostic tests since your last visit? no    Have you changed or stopped any medications since your last visit including any over-the-counter medicines, vitamins, or herbal medicines? no     Are you taking all your prescribed medications?  Yes    If NO, why?
(PROAMATINE) 10 MG tablet TAKE 1 TABLET BY MOUTH 3 TIMES DAILY (WITH MEALS) (Patient taking differently: Take 15 mg by mouth 3 times daily (with meals) ) 270 tablet 1    ONETOUCH VERIO strip TEST BLOOD SUGAR 3 TIMES A DAY E11.9 300 strip 3    acetaminophen (TYLENOL 8 HOUR) 650 MG extended release tablet Take 650 mg by mouth every 4 hours Don't take more than 3,000 mg each day.  isosorbide mononitrate (IMDUR) 30 MG extended release tablet Take 1 tablet by mouth daily 90 tablet 1    metoprolol tartrate (LOPRESSOR) 25 MG tablet Take 0.5 tablets by mouth Daily 60 tablet 5    Ferric Citrate (AURYXIA) 1  MG(Fe) TABS Take 1 tablet by mouth 3 times daily (Patient taking differently: Take 2 tablets by mouth 3 times daily ) 270 tablet 1    aspirin 81 MG chewable tablet Take 1 tablet by mouth daily 100 tablet 1    timolol (TIMOPTIC) 0.5 % ophthalmic solution Place 1 drop into both eyes 2 times daily Indications: Disease of the Eye 15 mL 1    OXYGEN by Nasal route See Admin Instructions Indications: Oxygen Therapy      warfarin (COUMADIN) 2.5 MG tablet Indications: Anticoagulant Therapy, Atrial Fibrillation, Blockage of Blood Vessel to Lung by a Particle Take as directed by [facility].  warfarin (COUMADIN) 3 MG tablet Take as directed by [facility]. 200 tablets for 90 days 200 tablet 3     No current facility-administered medications for this visit. Subjective:     Review of Systems   Constitutional: Negative for appetite change, chills, diaphoresis, fatigue, fever and weight loss. HENT: Negative for congestion, ear discharge, ear pain, postnasal drip, rhinorrhea, sinus pressure, sore throat, trouble swallowing and voice change. Respiratory: Positive for shortness of breath. Negative for cough, chest tightness and wheezing. Cardiovascular: Negative for chest pain, palpitations and leg swelling.    Gastrointestinal: Negative for abdominal pain, constipation,

## 2019-08-10 RX ORDER — ISOSORBIDE MONONITRATE 30 MG/1
30 TABLET, EXTENDED RELEASE ORAL DAILY
Qty: 90 TABLET | Refills: 1 | Status: SHIPPED | OUTPATIENT
Start: 2019-08-10 | End: 2019-10-22 | Stop reason: SDUPTHER

## 2019-08-13 RX ORDER — MIDODRINE HYDROCHLORIDE 10 MG/1
10 TABLET ORAL
Qty: 270 TABLET | Refills: 1 | Status: ON HOLD | OUTPATIENT
Start: 2019-08-13 | End: 2020-01-01

## 2019-08-13 NOTE — TELEPHONE ENCOUNTER
Date of last visit:  7/18/2019  Date of next visit:  10/22/2019    Requested Prescriptions     Pending Prescriptions Disp Refills    midodrine (PROAMATINE) 10 MG tablet [Pharmacy Med Name: MIDODRINE HCL 10 MG TABLET] 270 tablet 1     Sig: TAKE 1 TABLET BY MOUTH 3 TIMES DAILY (WITH MEALS)

## 2019-08-26 ENCOUNTER — HOSPITAL ENCOUNTER (OUTPATIENT)
Dept: PHARMACY | Age: 84
Setting detail: THERAPIES SERIES
Discharge: HOME | End: 2019-08-26
Payer: MEDICARE

## 2019-08-26 DIAGNOSIS — I27.82 OTHER CHRONIC PULMONARY EMBOLISM WITHOUT ACUTE COR PULMONALE (HCC): ICD-10-CM

## 2019-08-26 DIAGNOSIS — I48.0 PAROXYSMAL ATRIAL FIBRILLATION (HCC): ICD-10-CM

## 2019-08-26 LAB — POC INR: 2.6 (ref 0.8–1.2)

## 2019-08-26 PROCEDURE — 36416 COLLJ CAPILLARY BLOOD SPEC: CPT

## 2019-08-26 PROCEDURE — 99211 OFF/OP EST MAY X REQ PHY/QHP: CPT

## 2019-08-26 PROCEDURE — 85610 PROTHROMBIN TIME: CPT

## 2019-08-26 RX ORDER — MIDODRINE HYDROCHLORIDE 5 MG/1
5 TABLET ORAL
Refills: 4 | COMMUNITY
Start: 2019-08-13

## 2019-08-26 NOTE — PROGRESS NOTES
Medication Management [facility]  Anticoagulation Clinic  [phone] (phone)  [phone] (fax)      Mr. Elham Acosta is a 80 y.o.  male with history of paroxysmal Afib, PE who presents today for anticoagulation monitoring and adjustment. Patient verifies current dosing regimen and tablet strength. No missed or extra doses. Patient denies s/s bruising/swelling/chest pain. Usual COOK. Had one runny nosebleed since last appt. Lasted approximately 30 minutes. No blood in urine or stool. No dietary changes. No changes in medication/OTC agents/Herbals. No change in alcohol use or tobacco use. No change in activity level. Patient denies headaches/dizziness/falls. Usual lightheadedness after dialysis sometimes. No vomiting/diarrhea or acute illness. No procedures scheduled in the future at this time. Assessment:   Lab Results   Component Value Date    INR 2.60 (H) 08/26/2019    INR 2.50 (H) 07/15/2019    INR 2.20 (H) 06/03/2019     INR therapeutic   Recent Labs     08/26/19  1136   INR 2.60*     Plan: POCT INR ordered and result reviewed. Continue Coumadin 6 mg po daily. Recheck INR in 6 weeks. Patient reminded to call the Anticoagulation Clinic with any signs or symptoms of bleeding or with any medication changes. Patient given instructions utilizing the teach back method. Discharged via transport chair in no apparent distress with wife. After visit summary printed and reviewed with patient.       Medications reviewed and updated on home medication list Yes    Influenza vaccine:     [] given    [] declined   [x] received previously   [] plans to receive at a later time   [] refused    [x] documented in EPIC

## 2019-09-09 ENCOUNTER — OFFICE VISIT (OUTPATIENT)
Dept: PULMONOLOGY | Age: 84
End: 2019-09-09
Payer: MEDICARE

## 2019-09-09 VITALS
BODY MASS INDEX: 45.1 KG/M2 | DIASTOLIC BLOOD PRESSURE: 62 MMHG | SYSTOLIC BLOOD PRESSURE: 114 MMHG | HEIGHT: 70 IN | HEART RATE: 58 BPM | WEIGHT: 315 LBS | OXYGEN SATURATION: 92 %

## 2019-09-09 DIAGNOSIS — E66.01 CLASS 3 SEVERE OBESITY DUE TO EXCESS CALORIES WITH SERIOUS COMORBIDITY AND BODY MASS INDEX (BMI) OF 50.0 TO 59.9 IN ADULT (HCC): ICD-10-CM

## 2019-09-09 DIAGNOSIS — G47.33 OSA TREATED WITH BIPAP: Primary | ICD-10-CM

## 2019-09-09 DIAGNOSIS — J96.11 CHRONIC RESPIRATORY FAILURE WITH HYPOXIA AND HYPERCAPNIA (HCC): ICD-10-CM

## 2019-09-09 DIAGNOSIS — J44.9 CHRONIC OBSTRUCTIVE PULMONARY DISEASE, UNSPECIFIED COPD TYPE (HCC): ICD-10-CM

## 2019-09-09 DIAGNOSIS — J96.12 CHRONIC RESPIRATORY FAILURE WITH HYPOXIA AND HYPERCAPNIA (HCC): ICD-10-CM

## 2019-09-09 PROCEDURE — 1123F ACP DISCUSS/DSCN MKR DOCD: CPT | Performed by: NURSE PRACTITIONER

## 2019-09-09 PROCEDURE — G8926 SPIRO NO PERF OR DOC: HCPCS | Performed by: NURSE PRACTITIONER

## 2019-09-09 PROCEDURE — 3023F SPIROM DOC REV: CPT | Performed by: NURSE PRACTITIONER

## 2019-09-09 PROCEDURE — G8598 ASA/ANTIPLAT THER USED: HCPCS | Performed by: NURSE PRACTITIONER

## 2019-09-09 PROCEDURE — G8427 DOCREV CUR MEDS BY ELIG CLIN: HCPCS | Performed by: NURSE PRACTITIONER

## 2019-09-09 PROCEDURE — G8417 CALC BMI ABV UP PARAM F/U: HCPCS | Performed by: NURSE PRACTITIONER

## 2019-09-09 PROCEDURE — 99213 OFFICE O/P EST LOW 20 MIN: CPT | Performed by: NURSE PRACTITIONER

## 2019-09-09 PROCEDURE — 1036F TOBACCO NON-USER: CPT | Performed by: NURSE PRACTITIONER

## 2019-09-09 PROCEDURE — 4040F PNEUMOC VAC/ADMIN/RCVD: CPT | Performed by: NURSE PRACTITIONER

## 2019-09-09 NOTE — PROGRESS NOTES
TEST BLOOD SUGAR 3 TIMES A DAY E11.9 300 strip 3    warfarin (COUMADIN) 3 MG tablet Take as directed by [facility]. 200 tablets for 90 days 200 tablet 3    acetaminophen (TYLENOL 8 HOUR) 650 MG extended release tablet Take 650 mg by mouth every 4 hours Don't take more than 3,000 mg each day.  isosorbide mononitrate (IMDUR) 30 MG extended release tablet Take 1 tablet by mouth daily 90 tablet 1    metoprolol tartrate (LOPRESSOR) 25 MG tablet Take 0.5 tablets by mouth Daily 60 tablet 5    Ferric Citrate (AURYXIA) 1  MG(Fe) TABS Take 1 tablet by mouth 3 times daily (Patient taking differently: Take 2 tablets by mouth 3 times daily ) 270 tablet 1    aspirin 81 MG chewable tablet Take 1 tablet by mouth daily 100 tablet 1    timolol (TIMOPTIC) 0.5 % ophthalmic solution Place 1 drop into both eyes 2 times daily Indications: Disease of the Eye 15 mL 1    OXYGEN by Nasal route See Admin Instructions Indications: Oxygen Therapy      warfarin (COUMADIN) 2.5 MG tablet Indications: Anticoagulant Therapy, Atrial Fibrillation, Blockage of Blood Vessel to Lung by a Particle Take as directed by [facility]. No current facility-administered medications for this visit. Family History   Problem Relation Age of Onset    Diabetes Father     Diabetes Sister     Diabetes Brother         Review of Systems   General/Constitutional: No recent loss of weight or appetite changes. No fever or chills. HENT: Negative. Eyes: Negative. Upper respiratory tract: No nasal stuffiness or post nasal drip. Lower respiratory tract/ lungs: No cough or sputum production. No hemoptysis. Cardiovascular: No palpitations or chest pain. Gastrointestinal: No nausea or vomiting. Neurological: No focal neurologiacal weakness. Extremities: No edema. Musculoskeletal: No complaints. Genitourinary: No complaints. Hematological: Negative. Psychiatric/Behavioral: Negative.    Skin: No itching. Physical Exam:    BMI:  Body mass index is 50.05 kg/m². Wt Readings from Last 3 Encounters:   09/09/19 (!) 348 lb 12.8 oz (158.2 kg)   06/03/19 (!) 351 lb 6.4 oz (159.4 kg)   05/20/19 (!) 350 lb (158.8 kg)     Weight stable / unchanged  Vitals: /62 (Site: Left Upper Arm, Position: Sitting, Cuff Size: Large Adult)   Pulse 58   Ht 5' 10\" (1.778 m)   Wt (!) 348 lb 12.8 oz (158.2 kg)   SpO2 92% Comment: on RA  BMI 50.05 kg/m²         General Appearance Obese, well nourished, in no acute distress. HEENT - Head is normocephalic, atraumatic. PERRL. Oral mucosa pink and moist, no oral thrush. Mallampati Score - III (soft palate, base of uvula visible). Neck - Supple, symmetrical, trachea midline and soft. Lungs - Chest symmetric with normal A/P diameter, normal respiratory rate and rhythm, lungs diminished but clear to auscultation, no wheezes, rales or rhonchi. Cardiovascular - Heart sounds are normal. Regular rhythm normal rate + systolic murmur, no gallop or rub. Abdomen - Soft, nontender, non-distended. Neurologic - Alert and oriented x 3. Skin - No bruising or bleeding. Extremities - No cyanosis, clubbing. 2+ BLE edema. ASSESSMENT/DIAGNOSIS     Diagnosis Orders   1. KELSEY treated with BiPAP     2. Chronic respiratory failure with hypoxia and hypercapnia (HCC)     3. Chronic obstructive pulmonary disease, unspecified COPD type ([facility]     4. Class 3 severe obesity due to excess calories with serious comorbidity and body mass index (BMI) of 50.0 to 59.9 in adult [facility])              Plan   Do you need any equipment today? No. Keep Pulmonary follow up with [facility] in November.    - He  was advised to continue current positive airway pressure therapy with above described pressure. - He  advised to keep goodcompliance with current recommended pressure to get optimal results and clinical improvement.  - Recommend 7-9 hours of sleep with PAP treatment.   - He was advised to call DME company regarding supplies if needed.   -He call my office for earlier appointment if needed for worsening of sleep symptoms.   - He was instructed on weight loss. - Son Alexander was educated about my impression and plan. Patient verbalizes understanding. We will see Isrrael Lynch back in: 1 year with download.     Electronically signed by NIC Acosta CNP on 9/9/2019 at 11:23 AM

## 2019-10-07 ENCOUNTER — HOSPITAL ENCOUNTER (OUTPATIENT)
Dept: PHARMACY | Age: 84
Setting detail: THERAPIES SERIES
Discharge: HOME | End: 2019-10-07
Payer: MEDICARE

## 2019-10-07 DIAGNOSIS — I48.0 PAROXYSMAL ATRIAL FIBRILLATION (HCC): ICD-10-CM

## 2019-10-07 DIAGNOSIS — I27.82 OTHER CHRONIC PULMONARY EMBOLISM WITHOUT ACUTE COR PULMONALE (HCC): ICD-10-CM

## 2019-10-07 LAB — POC INR: 2.7 (ref 0.8–1.2)

## 2019-10-07 PROCEDURE — 99211 OFF/OP EST MAY X REQ PHY/QHP: CPT

## 2019-10-07 PROCEDURE — 36416 COLLJ CAPILLARY BLOOD SPEC: CPT

## 2019-10-07 PROCEDURE — 85610 PROTHROMBIN TIME: CPT

## 2019-10-07 RX ORDER — AMOXICILLIN 500 MG/1
CAPSULE ORAL
Refills: 1 | COMMUNITY
Start: 2019-09-18 | End: 2019-11-06 | Stop reason: ALTCHOICE

## 2019-10-08 ENCOUNTER — PROCEDURE VISIT (OUTPATIENT)
Dept: CARDIOLOGY CLINIC | Age: 84
End: 2019-10-08
Payer: MEDICARE

## 2019-10-08 DIAGNOSIS — Z95.0 PACEMAKER: Primary | ICD-10-CM

## 2019-10-08 PROCEDURE — 93296 REM INTERROG EVL PM/IDS: CPT | Performed by: INTERNAL MEDICINE

## 2019-10-08 PROCEDURE — 93294 REM INTERROG EVL PM/LDLS PM: CPT | Performed by: INTERNAL MEDICINE

## 2019-10-12 RX ORDER — FLUTICASONE PROPIONATE 50 MCG
SPRAY, SUSPENSION (ML) NASAL
Qty: 48 G | Refills: 0 | Status: SHIPPED | OUTPATIENT
Start: 2019-10-12 | End: 2019-10-22 | Stop reason: SDUPTHER

## 2019-10-22 ENCOUNTER — OFFICE VISIT (OUTPATIENT)
Dept: FAMILY MEDICINE CLINIC | Age: 84
End: 2019-10-22

## 2019-10-22 VITALS
HEART RATE: 76 BPM | BODY MASS INDEX: 50.05 KG/M2 | SYSTOLIC BLOOD PRESSURE: 132 MMHG | RESPIRATION RATE: 16 BRPM | HEIGHT: 70 IN | DIASTOLIC BLOOD PRESSURE: 80 MMHG

## 2019-10-22 DIAGNOSIS — I10 ESSENTIAL HYPERTENSION: ICD-10-CM

## 2019-10-22 DIAGNOSIS — J44.9 CHRONIC OBSTRUCTIVE PULMONARY DISEASE, UNSPECIFIED COPD TYPE (HCC): ICD-10-CM

## 2019-10-22 DIAGNOSIS — E78.5 HYPERLIPIDEMIA, UNSPECIFIED HYPERLIPIDEMIA TYPE: ICD-10-CM

## 2019-10-22 DIAGNOSIS — N18.4 TYPE 2 DIABETES MELLITUS WITH STAGE 4 CHRONIC KIDNEY DISEASE, WITH LONG-TERM CURRENT USE OF INSULIN (HCC): Primary | ICD-10-CM

## 2019-10-22 DIAGNOSIS — Z79.4 TYPE 2 DIABETES MELLITUS WITH STAGE 4 CHRONIC KIDNEY DISEASE, WITH LONG-TERM CURRENT USE OF INSULIN (HCC): Primary | ICD-10-CM

## 2019-10-22 DIAGNOSIS — N18.6 ESRD ON DIALYSIS (HCC): ICD-10-CM

## 2019-10-22 DIAGNOSIS — Z99.2 ESRD ON DIALYSIS (HCC): ICD-10-CM

## 2019-10-22 DIAGNOSIS — E11.22 TYPE 2 DIABETES MELLITUS WITH STAGE 4 CHRONIC KIDNEY DISEASE, WITH LONG-TERM CURRENT USE OF INSULIN (HCC): Primary | ICD-10-CM

## 2019-10-22 LAB
CHP ED QC CHECK: ABNORMAL
GLUCOSE BLD-MCNC: 175 MG/DL
HBA1C MFR BLD: 7.8 %

## 2019-10-22 PROCEDURE — 99213 OFFICE O/P EST LOW 20 MIN: CPT | Performed by: EMERGENCY MEDICINE

## 2019-10-22 PROCEDURE — 1036F TOBACCO NON-USER: CPT | Performed by: EMERGENCY MEDICINE

## 2019-10-22 PROCEDURE — 1123F ACP DISCUSS/DSCN MKR DOCD: CPT | Performed by: EMERGENCY MEDICINE

## 2019-10-22 PROCEDURE — G8427 DOCREV CUR MEDS BY ELIG CLIN: HCPCS | Performed by: EMERGENCY MEDICINE

## 2019-10-22 PROCEDURE — G8598 ASA/ANTIPLAT THER USED: HCPCS | Performed by: EMERGENCY MEDICINE

## 2019-10-22 PROCEDURE — 4040F PNEUMOC VAC/ADMIN/RCVD: CPT | Performed by: EMERGENCY MEDICINE

## 2019-10-22 PROCEDURE — G8417 CALC BMI ABV UP PARAM F/U: HCPCS | Performed by: EMERGENCY MEDICINE

## 2019-10-22 PROCEDURE — 82962 GLUCOSE BLOOD TEST: CPT | Performed by: EMERGENCY MEDICINE

## 2019-10-22 PROCEDURE — 83036 HEMOGLOBIN GLYCOSYLATED A1C: CPT | Performed by: EMERGENCY MEDICINE

## 2019-10-22 RX ORDER — FLUTICASONE PROPIONATE 50 MCG
SPRAY, SUSPENSION (ML) NASAL
Qty: 48 G | Refills: 0 | Status: SHIPPED | OUTPATIENT
Start: 2019-10-22 | End: 2020-01-01

## 2019-10-22 RX ORDER — WARFARIN SODIUM 3 MG/1
TABLET ORAL
Qty: 200 TABLET | Refills: 3 | Status: SHIPPED | OUTPATIENT
Start: 2019-10-22 | End: 2020-01-01

## 2019-10-22 RX ORDER — ATORVASTATIN CALCIUM 40 MG/1
40 TABLET, FILM COATED ORAL DAILY
Qty: 90 TABLET | Refills: 1 | Status: SHIPPED | OUTPATIENT
Start: 2019-10-22 | End: 2020-01-01 | Stop reason: SDUPTHER

## 2019-10-22 RX ORDER — FLUDROCORTISONE ACETATE 0.1 MG/1
0.1 TABLET ORAL DAILY
Qty: 90 TABLET | Refills: 1 | Status: SHIPPED | OUTPATIENT
Start: 2019-10-22 | End: 2020-01-01 | Stop reason: SDUPTHER

## 2019-10-22 RX ORDER — TAMSULOSIN HYDROCHLORIDE 0.4 MG/1
0.4 CAPSULE ORAL DAILY
Qty: 90 CAPSULE | Refills: 1 | Status: SHIPPED | OUTPATIENT
Start: 2019-10-22 | End: 2020-01-01 | Stop reason: SDUPTHER

## 2019-10-22 RX ORDER — ISOSORBIDE MONONITRATE 30 MG/1
30 TABLET, EXTENDED RELEASE ORAL DAILY
Qty: 90 TABLET | Refills: 1 | Status: SHIPPED | OUTPATIENT
Start: 2019-10-22 | End: 2020-01-01 | Stop reason: SDUPTHER

## 2019-10-22 ASSESSMENT — ENCOUNTER SYMPTOMS
SHORTNESS OF BREATH: 0
CONSTIPATION: 0
NAUSEA: 0
WHEEZING: 0
DIARRHEA: 0
VISUAL CHANGE: 0
RHINORRHEA: 0
BACK PAIN: 0
COUGH: 0
VOMITING: 0
ABDOMINAL PAIN: 0
VOICE CHANGE: 0
SORE THROAT: 0
CHEST TIGHTNESS: 0
TROUBLE SWALLOWING: 0
SINUS PRESSURE: 0

## 2019-10-22 ASSESSMENT — PATIENT HEALTH QUESTIONNAIRE - PHQ9
SUM OF ALL RESPONSES TO PHQ QUESTIONS 1-9: 0
SUM OF ALL RESPONSES TO PHQ QUESTIONS 1-9: 0
1. LITTLE INTEREST OR PLEASURE IN DOING THINGS: 0

## 2019-10-24 ENCOUNTER — TELEPHONE (OUTPATIENT)
Dept: CARDIOLOGY CLINIC | Age: 84
End: 2019-10-24

## 2019-10-30 ENCOUNTER — HOSPITAL ENCOUNTER (OUTPATIENT)
Dept: PULMONOLOGY | Age: 84
Discharge: HOME | End: 2019-10-30
Payer: MEDICARE

## 2019-10-30 DIAGNOSIS — E66.01 MORBID OBESITY WITH BMI OF 50.0-59.9, ADULT (HCC): ICD-10-CM

## 2019-10-30 DIAGNOSIS — J96.10 CHRONIC RESPIRATORY FAILURE, UNSPECIFIED WHETHER WITH HYPOXIA OR HYPERCAPNIA (HCC): ICD-10-CM

## 2019-10-30 DIAGNOSIS — G47.33 OSA (OBSTRUCTIVE SLEEP APNEA): ICD-10-CM

## 2019-10-30 PROCEDURE — 94729 DIFFUSING CAPACITY: CPT

## 2019-10-30 PROCEDURE — 94726 PLETHYSMOGRAPHY LUNG VOLUMES: CPT

## 2019-10-30 PROCEDURE — 2709999900 HC NON-CHARGEABLE SUPPLY

## 2019-10-30 PROCEDURE — 94060 EVALUATION OF WHEEZING: CPT

## 2019-11-04 ENCOUNTER — OFFICE VISIT (OUTPATIENT)
Dept: PULMONOLOGY | Age: 84
End: 2019-11-04
Payer: MEDICARE

## 2019-11-04 VITALS
SYSTOLIC BLOOD PRESSURE: 132 MMHG | HEIGHT: 70 IN | WEIGHT: 315 LBS | OXYGEN SATURATION: 93 % | DIASTOLIC BLOOD PRESSURE: 70 MMHG | HEART RATE: 70 BPM | TEMPERATURE: 98 F | BODY MASS INDEX: 45.1 KG/M2

## 2019-11-04 DIAGNOSIS — E66.01 MORBID OBESITY WITH BMI OF 50.0-59.9, ADULT (HCC): ICD-10-CM

## 2019-11-04 DIAGNOSIS — J96.11 CHRONIC RESPIRATORY FAILURE WITH HYPOXIA AND HYPERCAPNIA (HCC): ICD-10-CM

## 2019-11-04 DIAGNOSIS — J98.4 MIXED RESTRICTIVE AND OBSTRUCTIVE LUNG DISEASE (HCC): Primary | ICD-10-CM

## 2019-11-04 DIAGNOSIS — E11.22 TYPE 2 DIABETES MELLITUS WITH STAGE 4 CHRONIC KIDNEY DISEASE, WITH LONG-TERM CURRENT USE OF INSULIN (HCC): ICD-10-CM

## 2019-11-04 DIAGNOSIS — Z79.4 TYPE 2 DIABETES MELLITUS WITH STAGE 4 CHRONIC KIDNEY DISEASE, WITH LONG-TERM CURRENT USE OF INSULIN (HCC): ICD-10-CM

## 2019-11-04 DIAGNOSIS — N18.4 TYPE 2 DIABETES MELLITUS WITH STAGE 4 CHRONIC KIDNEY DISEASE, WITH LONG-TERM CURRENT USE OF INSULIN (HCC): ICD-10-CM

## 2019-11-04 DIAGNOSIS — G47.33 OSA TREATED WITH BIPAP: ICD-10-CM

## 2019-11-04 DIAGNOSIS — J43.9 MIXED RESTRICTIVE AND OBSTRUCTIVE LUNG DISEASE (HCC): Primary | ICD-10-CM

## 2019-11-04 DIAGNOSIS — J96.12 CHRONIC RESPIRATORY FAILURE WITH HYPOXIA AND HYPERCAPNIA (HCC): ICD-10-CM

## 2019-11-04 PROCEDURE — G8427 DOCREV CUR MEDS BY ELIG CLIN: HCPCS | Performed by: NURSE PRACTITIONER

## 2019-11-04 PROCEDURE — G8484 FLU IMMUNIZE NO ADMIN: HCPCS | Performed by: NURSE PRACTITIONER

## 2019-11-04 PROCEDURE — 99214 OFFICE O/P EST MOD 30 MIN: CPT | Performed by: NURSE PRACTITIONER

## 2019-11-04 PROCEDURE — 95012 NITRIC OXIDE EXP GAS DETER: CPT | Performed by: NURSE PRACTITIONER

## 2019-11-04 PROCEDURE — G8417 CALC BMI ABV UP PARAM F/U: HCPCS | Performed by: NURSE PRACTITIONER

## 2019-11-04 PROCEDURE — G8598 ASA/ANTIPLAT THER USED: HCPCS | Performed by: NURSE PRACTITIONER

## 2019-11-04 PROCEDURE — 4040F PNEUMOC VAC/ADMIN/RCVD: CPT | Performed by: NURSE PRACTITIONER

## 2019-11-04 PROCEDURE — 1036F TOBACCO NON-USER: CPT | Performed by: NURSE PRACTITIONER

## 2019-11-04 PROCEDURE — G8926 SPIRO NO PERF OR DOC: HCPCS | Performed by: NURSE PRACTITIONER

## 2019-11-04 PROCEDURE — 1123F ACP DISCUSS/DSCN MKR DOCD: CPT | Performed by: NURSE PRACTITIONER

## 2019-11-04 PROCEDURE — 3023F SPIROM DOC REV: CPT | Performed by: NURSE PRACTITIONER

## 2019-11-04 RX ORDER — BUDESONIDE AND FORMOTEROL FUMARATE DIHYDRATE 80; 4.5 UG/1; UG/1
2 AEROSOL RESPIRATORY (INHALATION) 2 TIMES DAILY
Qty: 1 INHALER | Refills: 11 | Status: SHIPPED | OUTPATIENT
Start: 2019-11-04 | End: 2020-01-01

## 2019-11-06 ENCOUNTER — HOSPITAL ENCOUNTER (OUTPATIENT)
Dept: PHARMACY | Age: 84
Setting detail: THERAPIES SERIES
Discharge: HOME | End: 2019-11-06
Payer: MEDICARE

## 2019-11-06 DIAGNOSIS — I27.82 OTHER CHRONIC PULMONARY EMBOLISM WITHOUT ACUTE COR PULMONALE (HCC): ICD-10-CM

## 2019-11-06 DIAGNOSIS — I48.0 PAROXYSMAL ATRIAL FIBRILLATION (HCC): ICD-10-CM

## 2019-11-06 LAB — POC INR: 1.4 (ref 0.8–1.2)

## 2019-11-06 PROCEDURE — 85610 PROTHROMBIN TIME: CPT

## 2019-11-06 PROCEDURE — 99211 OFF/OP EST MAY X REQ PHY/QHP: CPT

## 2019-11-06 PROCEDURE — 36416 COLLJ CAPILLARY BLOOD SPEC: CPT

## 2019-11-08 DIAGNOSIS — I48.0 PAROXYSMAL ATRIAL FIBRILLATION (HCC): Primary | ICD-10-CM

## 2019-11-08 RX ORDER — WARFARIN SODIUM 3 MG/1
TABLET ORAL
Qty: 180 TABLET | Refills: 3 | Status: SHIPPED | OUTPATIENT
Start: 2019-11-08 | End: 2019-11-20

## 2019-11-20 ENCOUNTER — HOSPITAL ENCOUNTER (OUTPATIENT)
Dept: PHARMACY | Age: 84
Setting detail: THERAPIES SERIES
Discharge: HOME | End: 2019-11-20
Payer: MEDICARE

## 2019-11-20 DIAGNOSIS — I48.0 PAROXYSMAL ATRIAL FIBRILLATION (HCC): ICD-10-CM

## 2019-11-20 DIAGNOSIS — I27.82 OTHER CHRONIC PULMONARY EMBOLISM WITHOUT ACUTE COR PULMONALE (HCC): ICD-10-CM

## 2019-11-20 LAB — POC INR: 2.8 (ref 0.8–1.2)

## 2019-11-20 PROCEDURE — 99211 OFF/OP EST MAY X REQ PHY/QHP: CPT

## 2019-11-20 PROCEDURE — 85610 PROTHROMBIN TIME: CPT

## 2019-11-20 PROCEDURE — 36416 COLLJ CAPILLARY BLOOD SPEC: CPT

## 2020-01-01 ENCOUNTER — APPOINTMENT (OUTPATIENT)
Dept: CT IMAGING | Age: 85
DRG: 208 | End: 2020-01-01
Payer: MEDICARE

## 2020-01-01 ENCOUNTER — TELEPHONE (OUTPATIENT)
Dept: PHARMACY | Age: 85
End: 2020-01-01

## 2020-01-01 ENCOUNTER — HOSPITAL ENCOUNTER (OUTPATIENT)
Dept: PHARMACY | Age: 85
Setting detail: THERAPIES SERIES
Discharge: HOME | End: 2020-04-21
Payer: MEDICARE

## 2020-01-01 ENCOUNTER — APPOINTMENT (OUTPATIENT)
Dept: GENERAL RADIOLOGY | Age: 85
DRG: 208 | End: 2020-01-01
Payer: MEDICARE

## 2020-01-01 ENCOUNTER — OFFICE VISIT (OUTPATIENT)
Dept: PULMONOLOGY | Age: 85
End: 2020-01-01
Payer: MEDICARE

## 2020-01-01 ENCOUNTER — HOSPITAL ENCOUNTER (OUTPATIENT)
Dept: PHARMACY | Age: 85
Setting detail: THERAPIES SERIES
Discharge: HOME | End: 2020-03-18
Payer: MEDICARE

## 2020-01-01 ENCOUNTER — TELEPHONE (OUTPATIENT)
Dept: AUDIOLOGY | Age: 85
End: 2020-01-01

## 2020-01-01 ENCOUNTER — OFFICE VISIT (OUTPATIENT)
Dept: FAMILY MEDICINE CLINIC | Age: 85
End: 2020-01-01

## 2020-01-01 ENCOUNTER — PROCEDURE VISIT (OUTPATIENT)
Dept: CARDIOLOGY CLINIC | Age: 85
End: 2020-01-01
Payer: MEDICARE

## 2020-01-01 ENCOUNTER — HOSPITAL ENCOUNTER (OUTPATIENT)
Dept: PHARMACY | Age: 85
Setting detail: THERAPIES SERIES
Discharge: HOME | End: 2020-10-28
Payer: MEDICARE

## 2020-01-01 ENCOUNTER — TELEPHONE (OUTPATIENT)
Dept: FAMILY MEDICINE CLINIC | Age: 85
End: 2020-01-01

## 2020-01-01 ENCOUNTER — APPOINTMENT (OUTPATIENT)
Dept: GENERAL RADIOLOGY | Age: 85
DRG: 291 | End: 2020-01-01
Payer: MEDICARE

## 2020-01-01 ENCOUNTER — HOSPITAL ENCOUNTER (INPATIENT)
Age: 85
LOS: 3 days | DRG: 208 | End: 2020-11-24
Attending: EMERGENCY MEDICINE | Admitting: INTERNAL MEDICINE
Payer: MEDICARE

## 2020-01-01 ENCOUNTER — TELEPHONE (OUTPATIENT)
Dept: INTERNAL MEDICINE CLINIC | Age: 85
End: 2020-01-01

## 2020-01-01 ENCOUNTER — HOSPITAL ENCOUNTER (OUTPATIENT)
Dept: PHARMACY | Age: 85
Setting detail: THERAPIES SERIES
Discharge: HOME | End: 2020-02-12
Payer: MEDICARE

## 2020-01-01 ENCOUNTER — HOSPITAL ENCOUNTER (OUTPATIENT)
Dept: PHARMACY | Age: 85
Setting detail: THERAPIES SERIES
Discharge: HOME | End: 2020-01-29
Payer: MEDICARE

## 2020-01-01 ENCOUNTER — NURSE ONLY (OUTPATIENT)
Dept: LAB | Age: 85
End: 2020-01-01

## 2020-01-01 ENCOUNTER — HOSPITAL ENCOUNTER (OUTPATIENT)
Dept: PHARMACY | Age: 85
Setting detail: THERAPIES SERIES
Discharge: HOME | End: 2020-08-04
Payer: MEDICARE

## 2020-01-01 ENCOUNTER — HOSPITAL ENCOUNTER (OUTPATIENT)
Dept: PHARMACY | Age: 85
Setting detail: THERAPIES SERIES
Discharge: HOME | End: 2020-06-30
Payer: MEDICARE

## 2020-01-01 ENCOUNTER — HOSPITAL ENCOUNTER (OUTPATIENT)
Dept: PHARMACY | Age: 85
Setting detail: THERAPIES SERIES
Discharge: HOME | End: 2020-09-16
Payer: MEDICARE

## 2020-01-01 ENCOUNTER — HOSPITAL ENCOUNTER (OUTPATIENT)
Dept: PHARMACY | Age: 85
Setting detail: THERAPIES SERIES
Discharge: HOME | End: 2020-02-26
Payer: MEDICARE

## 2020-01-01 ENCOUNTER — HOSPITAL ENCOUNTER (INPATIENT)
Age: 85
LOS: 2 days | Discharge: HOME | DRG: 291 | End: 2020-02-06
Attending: EMERGENCY MEDICINE | Admitting: INTERNAL MEDICINE
Payer: MEDICARE

## 2020-01-01 ENCOUNTER — HOSPITAL ENCOUNTER (OUTPATIENT)
Dept: PHARMACY | Age: 85
Setting detail: THERAPIES SERIES
Discharge: HOME | End: 2020-05-26
Payer: MEDICARE

## 2020-01-01 ENCOUNTER — HOSPITAL ENCOUNTER (OUTPATIENT)
Dept: PHARMACY | Age: 85
Setting detail: THERAPIES SERIES
Discharge: HOME | End: 2020-01-15
Payer: MEDICARE

## 2020-01-01 VITALS
SYSTOLIC BLOOD PRESSURE: 169 MMHG | HEART RATE: 71 BPM | OXYGEN SATURATION: 97 % | WEIGHT: 315 LBS | RESPIRATION RATE: 18 BRPM | BODY MASS INDEX: 45.1 KG/M2 | TEMPERATURE: 98.8 F | DIASTOLIC BLOOD PRESSURE: 37 MMHG | HEIGHT: 70 IN

## 2020-01-01 VITALS
HEART RATE: 72 BPM | BODY MASS INDEX: 45.1 KG/M2 | HEIGHT: 70 IN | RESPIRATION RATE: 18 BRPM | WEIGHT: 315 LBS | SYSTOLIC BLOOD PRESSURE: 132 MMHG | DIASTOLIC BLOOD PRESSURE: 72 MMHG

## 2020-01-01 VITALS
DIASTOLIC BLOOD PRESSURE: 66 MMHG | HEART RATE: 54 BPM | BODY MASS INDEX: 45.1 KG/M2 | HEIGHT: 70 IN | RESPIRATION RATE: 16 BRPM | SYSTOLIC BLOOD PRESSURE: 112 MMHG | WEIGHT: 315 LBS

## 2020-01-01 VITALS
RESPIRATION RATE: 18 BRPM | WEIGHT: 315 LBS | HEIGHT: 70 IN | HEART RATE: 70 BPM | BODY MASS INDEX: 45.1 KG/M2 | SYSTOLIC BLOOD PRESSURE: 118 MMHG | TEMPERATURE: 98.1 F | OXYGEN SATURATION: 100 % | DIASTOLIC BLOOD PRESSURE: 76 MMHG

## 2020-01-01 VITALS
TEMPERATURE: 96.6 F | HEART RATE: 68 BPM | DIASTOLIC BLOOD PRESSURE: 54 MMHG | RESPIRATION RATE: 16 BRPM | WEIGHT: 315 LBS | BODY MASS INDEX: 45.1 KG/M2 | HEIGHT: 70 IN | SYSTOLIC BLOOD PRESSURE: 92 MMHG

## 2020-01-01 VITALS
OXYGEN SATURATION: 93 % | HEIGHT: 70 IN | HEART RATE: 88 BPM | WEIGHT: 315 LBS | BODY MASS INDEX: 45.1 KG/M2 | DIASTOLIC BLOOD PRESSURE: 64 MMHG | SYSTOLIC BLOOD PRESSURE: 128 MMHG

## 2020-01-01 VITALS
HEIGHT: 70 IN | DIASTOLIC BLOOD PRESSURE: 66 MMHG | WEIGHT: 315 LBS | SYSTOLIC BLOOD PRESSURE: 124 MMHG | BODY MASS INDEX: 45.1 KG/M2 | OXYGEN SATURATION: 94 % | HEART RATE: 84 BPM

## 2020-01-01 VITALS
SYSTOLIC BLOOD PRESSURE: 92 MMHG | BODY MASS INDEX: 45.1 KG/M2 | HEART RATE: 72 BPM | RESPIRATION RATE: 16 BRPM | WEIGHT: 315 LBS | TEMPERATURE: 97 F | DIASTOLIC BLOOD PRESSURE: 60 MMHG | HEIGHT: 70 IN

## 2020-01-01 VITALS
BODY MASS INDEX: 45.1 KG/M2 | TEMPERATURE: 98.8 F | SYSTOLIC BLOOD PRESSURE: 109 MMHG | DIASTOLIC BLOOD PRESSURE: 57 MMHG | OXYGEN SATURATION: 95 % | WEIGHT: 315 LBS | HEIGHT: 70 IN | RESPIRATION RATE: 20 BRPM | HEART RATE: 78 BPM

## 2020-01-01 VITALS
HEART RATE: 69 BPM | DIASTOLIC BLOOD PRESSURE: 78 MMHG | BODY MASS INDEX: 49.93 KG/M2 | TEMPERATURE: 98 F | SYSTOLIC BLOOD PRESSURE: 122 MMHG | OXYGEN SATURATION: 97 % | HEIGHT: 70 IN

## 2020-01-01 VITALS — TEMPERATURE: 98.4 F

## 2020-01-01 VITALS — TEMPERATURE: 98.2 F

## 2020-01-01 LAB
ABO: NORMAL
ACINETOBACTER CALCOACETICUS-BAUMANNII BY PCR: NOT DETECTED
ADENOVIRUS BY PCR: NOT DETECTED
ALBUMIN SERPL-MCNC: 3.2 G/DL (ref 3.5–5.1)
ALBUMIN SERPL-MCNC: 3.2 G/DL (ref 3.5–5.1)
ALBUMIN SERPL-MCNC: 3.3 G/DL (ref 3.5–5.1)
ALBUMIN SERPL-MCNC: 3.4 G/DL (ref 3.5–5.1)
ALBUMIN SERPL-MCNC: 3.5 G/DL (ref 3.5–5.1)
ALBUMIN SERPL-MCNC: 3.7 G/DL (ref 3.5–5.1)
ALBUMIN SERPL-MCNC: 4 G/DL (ref 3.5–5.1)
ALBUMIN: 3.8
ALLEN TEST: POSITIVE
ALLEN TEST: POSITIVE
ALP BLD-CCNC: 148 U/L (ref 38–126)
ALP BLD-CCNC: 157 U/L (ref 38–126)
ALP BLD-CCNC: 157 U/L (ref 38–126)
ALP BLD-CCNC: 165 U/L (ref 38–126)
ALP BLD-CCNC: 170 U/L (ref 38–126)
ALP BLD-CCNC: 177 U/L (ref 38–126)
ALP BLD-CCNC: 178 U/L (ref 38–126)
ALP BLD-CCNC: 178 U/L (ref 38–126)
ALP BLD-CCNC: 202 U/L (ref 38–126)
ALT SERPL-CCNC: 18 U/L (ref 11–66)
ALT SERPL-CCNC: 37 U/L (ref 11–66)
ALT SERPL-CCNC: 39 U/L (ref 11–66)
ALT SERPL-CCNC: 41 U/L (ref 11–66)
ALT SERPL-CCNC: 44 U/L (ref 11–66)
ALT SERPL-CCNC: 53 U/L (ref 11–66)
ALT SERPL-CCNC: 55 U/L (ref 11–66)
ALT SERPL-CCNC: 58 U/L (ref 11–66)
ALT SERPL-CCNC: 70 U/L (ref 11–66)
AMYLASE: 29 U/L (ref 20–104)
AMYLASE: 37 U/L (ref 20–104)
AMYLASE: 38 U/L (ref 20–104)
ANION GAP SERPL CALCULATED.3IONS-SCNC: 11 MEQ/L (ref 8–16)
ANION GAP SERPL CALCULATED.3IONS-SCNC: 12 MEQ/L (ref 8–16)
ANION GAP SERPL CALCULATED.3IONS-SCNC: 13 MEQ/L (ref 8–16)
ANION GAP SERPL CALCULATED.3IONS-SCNC: 13 MEQ/L (ref 8–16)
ANION GAP SERPL CALCULATED.3IONS-SCNC: 16 MEQ/L (ref 8–16)
ANION GAP SERPL CALCULATED.3IONS-SCNC: 16 MEQ/L (ref 8–16)
ANION GAP SERPL CALCULATED.3IONS-SCNC: 17 MEQ/L (ref 8–16)
ANION GAP SERPL CALCULATED.3IONS-SCNC: 18 MEQ/L (ref 8–16)
ANION GAP SERPL CALCULATED.3IONS-SCNC: 19 MEQ/L (ref 8–16)
ANION GAP SERPL CALCULATED.3IONS-SCNC: 23 MEQ/L (ref 8–16)
ANION GAP SERPL CALCULATED.3IONS-SCNC: 9 MEQ/L (ref 8–16)
ANISOCYTOSIS: PRESENT
ANTIBODY SCREEN: NORMAL
ANTIGEN INTERPRETATION: NEGATIVE
AST SERPL-CCNC: 21 U/L (ref 5–40)
AST SERPL-CCNC: 34 U/L (ref 5–40)
AST SERPL-CCNC: 36 U/L (ref 5–40)
AST SERPL-CCNC: 37 U/L (ref 5–40)
AST SERPL-CCNC: 40 U/L (ref 5–40)
AST SERPL-CCNC: 42 U/L (ref 5–40)
AST SERPL-CCNC: 49 U/L (ref 5–40)
AST SERPL-CCNC: 54 U/L (ref 5–40)
AST SERPL-CCNC: 69 U/L (ref 5–40)
AVERAGE GLUCOSE: NORMAL
AVERAGE GLUCOSE: NORMAL
BASE EXCESS (CALCULATED): 4.7 MMOL/L (ref -2.5–2.5)
BASE EXCESS (CALCULATED): 6 MMOL/L (ref -2.5–2.5)
BASE EXCESS MIXED: -0.1 MMOL/L (ref -2–3)
BASOPHILS # BLD: 0.6 %
BASOPHILS # BLD: 0.7 %
BASOPHILS # BLD: 0.7 %
BASOPHILS ABSOLUTE: 0.1 THOU/MM3 (ref 0–0.1)
BASOPHILS ABSOLUTE: ABNORMAL
BASOPHILS RELATIVE PERCENT: ABNORMAL
BILIRUB SERPL-MCNC: 0.3 MG/DL (ref 0.3–1.2)
BILIRUB SERPL-MCNC: 0.4 MG/DL (ref 0.3–1.2)
BILIRUB SERPL-MCNC: 0.5 MG/DL (ref 0.3–1.2)
BILIRUBIN DIRECT: < 0.2 MG/DL (ref 0–0.3)
BUN BLDV-MCNC: 21 MG/DL (ref 7–22)
BUN BLDV-MCNC: 22 MG/DL (ref 7–22)
BUN BLDV-MCNC: 24 MG/DL (ref 7–22)
BUN BLDV-MCNC: 29 MG/DL (ref 7–22)
BUN BLDV-MCNC: 35 MG/DL (ref 7–22)
BUN BLDV-MCNC: 35 MG/DL (ref 7–22)
BUN BLDV-MCNC: 41 MG/DL (ref 7–22)
BUN BLDV-MCNC: 43 MG/DL (ref 7–22)
BUN BLDV-MCNC: 46 MG/DL (ref 7–22)
BUN BLDV-MCNC: 56 MG/DL (ref 7–22)
BUN BLDV-MCNC: 74 MG/DL (ref 7–22)
BUN BLDV-MCNC: NORMAL MG/DL
CALCIUM IONIZED SERUM: 1.1 MMOL/L (ref 1.12–1.32)
CALCIUM IONIZED: 0.98 MMOL/L (ref 1.12–1.32)
CALCIUM IONIZED: 1.01 MMOL/L (ref 1.12–1.32)
CALCIUM IONIZED: 1.03 MMOL/L (ref 1.12–1.32)
CALCIUM IONIZED: 1.06 MMOL/L (ref 1.12–1.32)
CALCIUM IONIZED: 1.07 MMOL/L (ref 1.12–1.32)
CALCIUM IONIZED: 1.13 MMOL/L (ref 1.12–1.32)
CALCIUM IONIZED: 1.13 MMOL/L (ref 1.12–1.32)
CALCIUM IONIZED: 1.14 MMOL/L (ref 1.12–1.32)
CALCIUM IONIZED: 1.16 MMOL/L (ref 1.12–1.32)
CALCIUM IONIZED: 1.17 MMOL/L (ref 1.12–1.32)
CALCIUM SERPL-MCNC: 11 MG/DL (ref 8.5–10.5)
CALCIUM SERPL-MCNC: 8.1 MG/DL (ref 8.5–10.5)
CALCIUM SERPL-MCNC: 8.5 MG/DL (ref 8.5–10.5)
CALCIUM SERPL-MCNC: 8.6 MG/DL (ref 8.5–10.5)
CALCIUM SERPL-MCNC: 8.6 MG/DL (ref 8.5–10.5)
CALCIUM SERPL-MCNC: 8.7 MG/DL (ref 8.5–10.5)
CALCIUM SERPL-MCNC: 8.8 MG/DL
CALCIUM SERPL-MCNC: 9 MG/DL (ref 8.5–10.5)
CALCIUM SERPL-MCNC: 9.3 MG/DL (ref 8.5–10.5)
CALCIUM SERPL-MCNC: 9.4 MG/DL (ref 8.5–10.5)
CALCIUM SERPL-MCNC: 9.5 MG/DL (ref 8.5–10.5)
CALCIUM SERPL-MCNC: 9.6 MG/DL (ref 8.5–10.5)
CALCIUM SERPL-MCNC: 9.6 MG/DL (ref 8.5–10.5)
CALCIUM SERPL-MCNC: 9.9 MG/DL (ref 8.5–10.5)
CHLAMYDIA PNEUMONIAE BY PCR: NOT DETECTED
CHLORIDE BLD-SCNC: 91 MEQ/L (ref 98–111)
CHLORIDE BLD-SCNC: 91 MEQ/L (ref 98–111)
CHLORIDE BLD-SCNC: 92 MEQ/L (ref 98–111)
CHLORIDE BLD-SCNC: 93 MEQ/L (ref 98–111)
CHLORIDE BLD-SCNC: 93 MEQ/L (ref 98–111)
CHLORIDE BLD-SCNC: 94 MEQ/L (ref 98–111)
CHLORIDE BLD-SCNC: 94 MMOL/L
CHLORIDE BLD-SCNC: 95 MEQ/L (ref 98–111)
CHLORIDE BLD-SCNC: 96 MEQ/L (ref 98–111)
CHLORIDE, WHOLE BLOOD: 98 MEQ/L (ref 98–109)
CHP ED QC CHECK: ABNORMAL
CHP ED QC CHECK: ABNORMAL
CHP ED QC CHECK: NORMAL
CO2: 21 MEQ/L (ref 23–33)
CO2: 25 MEQ/L (ref 23–33)
CO2: 26 MEQ/L (ref 23–33)
CO2: 27 MEQ/L (ref 23–33)
CO2: 28 MEQ/L (ref 23–33)
CO2: 28 MEQ/L (ref 23–33)
CO2: 29 MEQ/L (ref 23–33)
CO2: NORMAL
COLLECTED BY:: ABNORMAL
CORONAVIRUS PCR: NOT DETECTED
CREAT SERPL-MCNC: 11.8 MG/DL (ref 0.4–1.2)
CREAT SERPL-MCNC: 2.7 MG/DL (ref 0.4–1.2)
CREAT SERPL-MCNC: 3.3 MG/DL (ref 0.4–1.2)
CREAT SERPL-MCNC: 3.9 MG/DL (ref 0.4–1.2)
CREAT SERPL-MCNC: 4.5 MG/DL (ref 0.4–1.2)
CREAT SERPL-MCNC: 4.7 MG/DL (ref 0.4–1.2)
CREAT SERPL-MCNC: 5.9 MG/DL (ref 0.4–1.2)
CREAT SERPL-MCNC: 6 MG/DL (ref 0.4–1.2)
CREAT SERPL-MCNC: 6 MG/DL (ref 0.4–1.2)
CREAT SERPL-MCNC: 6.9 MG/DL (ref 0.4–1.2)
CREAT SERPL-MCNC: 7 MG/DL (ref 0.4–1.2)
CREAT SERPL-MCNC: 7.5 MG/DL (ref 0.4–1.2)
CREAT SERPL-MCNC: 8.5 MG/DL (ref 0.4–1.2)
CREAT SERPL-MCNC: 9.58 MG/DL
CREATININE URINE: 11.1 MG/DL
DEVICE: ABNORMAL
DIFFERENTIAL TYPE: ABNORMAL
EKG ATRIAL RATE: 14 BPM
EKG ATRIAL RATE: 76 BPM
EKG ATRIAL RATE: 77 BPM
EKG ATRIAL RATE: 96 BPM
EKG Q-T INTERVAL: 318 MS
EKG Q-T INTERVAL: 504 MS
EKG Q-T INTERVAL: 510 MS
EKG Q-T INTERVAL: 560 MS
EKG QRS DURATION: 174 MS
EKG QRS DURATION: 196 MS
EKG QRS DURATION: 210 MS
EKG QRS DURATION: 212 MS
EKG QTC CALCULATION (BAZETT): 408 MS
EKG QTC CALCULATION (BAZETT): 550 MS
EKG QTC CALCULATION (BAZETT): 551 MS
EKG QTC CALCULATION (BAZETT): 604 MS
EKG R AXIS: -70 DEGREES
EKG R AXIS: -74 DEGREES
EKG R AXIS: -85 DEGREES
EKG R AXIS: -95 DEGREES
EKG T AXIS: 112 DEGREES
EKG T AXIS: 113 DEGREES
EKG T AXIS: 83 DEGREES
EKG T AXIS: 97 DEGREES
EKG VENTRICULAR RATE: 70 BPM
EKG VENTRICULAR RATE: 70 BPM
EKG VENTRICULAR RATE: 72 BPM
EKG VENTRICULAR RATE: 99 BPM
ENTEROBACTER CLOACAE COMPLEX BY PCR: NOT DETECTED
EOSINOPHIL # BLD: 1.2 %
EOSINOPHIL # BLD: 1.6 %
EOSINOPHIL # BLD: 3 %
EOSINOPHILS ABSOLUTE: 0.1 THOU/MM3 (ref 0–0.4)
EOSINOPHILS ABSOLUTE: 0.2 THOU/MM3 (ref 0–0.4)
EOSINOPHILS ABSOLUTE: 0.3 THOU/MM3 (ref 0–0.4)
EOSINOPHILS ABSOLUTE: ABNORMAL
EOSINOPHILS RELATIVE PERCENT: ABNORMAL
ERYTHROCYTE [DISTWIDTH] IN BLOOD BY AUTOMATED COUNT: 18 % (ref 11.5–14.5)
ERYTHROCYTE [DISTWIDTH] IN BLOOD BY AUTOMATED COUNT: 18.1 % (ref 11.5–14.5)
ERYTHROCYTE [DISTWIDTH] IN BLOOD BY AUTOMATED COUNT: 18.2 % (ref 11.5–14.5)
ERYTHROCYTE [DISTWIDTH] IN BLOOD BY AUTOMATED COUNT: 18.2 % (ref 11.5–14.5)
ERYTHROCYTE [DISTWIDTH] IN BLOOD BY AUTOMATED COUNT: 18.3 % (ref 11.5–14.5)
ERYTHROCYTE [DISTWIDTH] IN BLOOD BY AUTOMATED COUNT: 18.4 % (ref 11.5–14.5)
ERYTHROCYTE [DISTWIDTH] IN BLOOD BY AUTOMATED COUNT: 18.5 % (ref 11.5–14.5)
ERYTHROCYTE [DISTWIDTH] IN BLOOD BY AUTOMATED COUNT: 18.5 % (ref 11.5–14.5)
ERYTHROCYTE [DISTWIDTH] IN BLOOD BY AUTOMATED COUNT: 19.9 % (ref 11.5–14.5)
ERYTHROCYTE [DISTWIDTH] IN BLOOD BY AUTOMATED COUNT: 20 % (ref 11.5–14.5)
ERYTHROCYTE [DISTWIDTH] IN BLOOD BY AUTOMATED COUNT: 58.1 FL (ref 35–45)
ERYTHROCYTE [DISTWIDTH] IN BLOOD BY AUTOMATED COUNT: 58.6 FL (ref 35–45)
ERYTHROCYTE [DISTWIDTH] IN BLOOD BY AUTOMATED COUNT: 59.3 FL (ref 35–45)
ERYTHROCYTE [DISTWIDTH] IN BLOOD BY AUTOMATED COUNT: 59.7 FL (ref 35–45)
ERYTHROCYTE [DISTWIDTH] IN BLOOD BY AUTOMATED COUNT: 59.9 FL (ref 35–45)
ERYTHROCYTE [DISTWIDTH] IN BLOOD BY AUTOMATED COUNT: 60.9 FL (ref 35–45)
ERYTHROCYTE [DISTWIDTH] IN BLOOD BY AUTOMATED COUNT: 61.6 FL (ref 35–45)
ERYTHROCYTE [DISTWIDTH] IN BLOOD BY AUTOMATED COUNT: 62.4 FL (ref 35–45)
ERYTHROCYTE [DISTWIDTH] IN BLOOD BY AUTOMATED COUNT: 62.6 FL (ref 35–45)
ERYTHROCYTE [DISTWIDTH] IN BLOOD BY AUTOMATED COUNT: 64.2 FL (ref 35–45)
ERYTHROCYTE [DISTWIDTH] IN BLOOD BY AUTOMATED COUNT: 64.4 FL (ref 35–45)
ERYTHROCYTE [DISTWIDTH] IN BLOOD BY AUTOMATED COUNT: 64.7 FL (ref 35–45)
ERYTHROCYTE [DISTWIDTH] IN BLOOD BY AUTOMATED COUNT: 65.5 FL (ref 35–45)
ERYTHROCYTE [DISTWIDTH] IN BLOOD BY AUTOMATED COUNT: 68.1 FL (ref 35–45)
ESCHERICHIA COLI BY PCR: NOT DETECTED
FIO2, MIXED VENOUS: 100
GFR CALCULATED: NORMAL
GFR SERPL CREATININE-BSD FRML MDRD: 11 ML/MIN/1.73M2
GFR SERPL CREATININE-BSD FRML MDRD: 14 ML/MIN/1.73M2
GFR SERPL CREATININE-BSD FRML MDRD: 15 ML/MIN/1.73M2
GFR SERPL CREATININE-BSD FRML MDRD: 18 ML/MIN/1.73M2
GFR SERPL CREATININE-BSD FRML MDRD: 22 ML/MIN/1.73M2
GFR SERPL CREATININE-BSD FRML MDRD: 27 ML/MIN/1.73M2
GFR SERPL CREATININE-BSD FRML MDRD: 5 ML/MIN/1.73M2
GFR SERPL CREATININE-BSD FRML MDRD: 7 ML/MIN/1.73M2
GFR SERPL CREATININE-BSD FRML MDRD: 8 ML/MIN/1.73M2
GFR SERPL CREATININE-BSD FRML MDRD: 9 ML/MIN/1.73M2
GFR SERPL CREATININE-BSD FRML MDRD: 9 ML/MIN/1.73M2
GLUCOSE BLD-MCNC: 106 MG/DL
GLUCOSE BLD-MCNC: 109 MG/DL (ref 70–108)
GLUCOSE BLD-MCNC: 117 MG/DL (ref 70–108)
GLUCOSE BLD-MCNC: 118 MG/DL (ref 70–108)
GLUCOSE BLD-MCNC: 148 MG/DL
GLUCOSE BLD-MCNC: 151 MG/DL (ref 70–108)
GLUCOSE BLD-MCNC: 194 MG/DL (ref 70–108)
GLUCOSE BLD-MCNC: 201 MG/DL (ref 70–108)
GLUCOSE BLD-MCNC: 204 MG/DL (ref 70–108)
GLUCOSE BLD-MCNC: 206 MG/DL (ref 70–108)
GLUCOSE BLD-MCNC: 219 MG/DL
GLUCOSE BLD-MCNC: 219 MG/DL (ref 70–108)
GLUCOSE BLD-MCNC: 223 MG/DL (ref 70–108)
GLUCOSE BLD-MCNC: 226 MG/DL (ref 70–108)
GLUCOSE BLD-MCNC: 227 MG/DL (ref 70–108)
GLUCOSE BLD-MCNC: 229 MG/DL (ref 70–108)
GLUCOSE BLD-MCNC: 233 MG/DL (ref 70–108)
GLUCOSE BLD-MCNC: 249 MG/DL (ref 70–108)
GLUCOSE BLD-MCNC: 256 MG/DL (ref 70–108)
GLUCOSE BLD-MCNC: 256 MG/DL (ref 70–108)
GLUCOSE BLD-MCNC: 257 MG/DL (ref 70–108)
GLUCOSE BLD-MCNC: 271 MG/DL (ref 70–108)
GLUCOSE BLD-MCNC: 272 MG/DL (ref 70–108)
GLUCOSE BLD-MCNC: 283 MG/DL (ref 70–108)
GLUCOSE BLD-MCNC: 292 MG/DL (ref 70–108)
GLUCOSE BLD-MCNC: 300 MG/DL (ref 70–108)
GLUCOSE BLD-MCNC: 305 MG/DL (ref 70–108)
GLUCOSE BLD-MCNC: 325 MG/DL (ref 70–108)
GLUCOSE BLD-MCNC: 330 MG/DL (ref 70–108)
GLUCOSE BLD-MCNC: 350 MG/DL (ref 70–108)
GLUCOSE BLD-MCNC: 387 MG/DL (ref 70–108)
GLUCOSE BLD-MCNC: NORMAL MG/DL
GLUCOSE, WHOLE BLOOD: 239 MG/DL (ref 70–108)
GLUCOSE, WHOLE BLOOD: 245 MG/DL (ref 70–108)
GLUCOSE, WHOLE BLOOD: 263 MG/DL (ref 70–108)
GLUCOSE, WHOLE BLOOD: 268 MG/DL (ref 70–108)
GLUCOSE, WHOLE BLOOD: 281 MG/DL (ref 70–108)
GLUCOSE, WHOLE BLOOD: 294 MG/DL (ref 70–108)
GLUCOSE, WHOLE BLOOD: 299 MG/DL (ref 70–108)
GLUCOSE, WHOLE BLOOD: 301 MG/DL (ref 70–108)
GLUCOSE, WHOLE BLOOD: 323 MG/DL (ref 70–108)
GLUCOSE, WHOLE BLOOD: 357 MG/DL (ref 70–108)
GLUCOSE, WHOLE BLOOD: 372 MG/DL (ref 70–108)
GRAM STAIN RESULT: ABNORMAL
HAEMOPHILUS INFLUENZAE BY PCR: NOT DETECTED
HBA1C MFR BLD: 7.1 %
HBA1C MFR BLD: 7.3 %
HBA1C MFR BLD: 7.6 %
HBA1C MFR BLD: 7.8 %
HBA1C MFR BLD: 8.3 %
HCO3, MIXED: 32 MMOL/L (ref 23–28)
HCO3: 33 MMOL/L (ref 23–28)
HCO3: 34 MMOL/L (ref 23–28)
HCT VFR BLD CALC: 26.4 % (ref 42–52)
HCT VFR BLD CALC: 27.8 % (ref 42–52)
HCT VFR BLD CALC: 28.5 % (ref 42–52)
HCT VFR BLD CALC: 28.8 % (ref 42–52)
HCT VFR BLD CALC: 30.5 % (ref 42–52)
HCT VFR BLD CALC: 30.6 % (ref 42–52)
HCT VFR BLD CALC: 31.8 % (ref 42–52)
HCT VFR BLD CALC: 32.2 % (ref 42–52)
HCT VFR BLD CALC: 32.4 % (ref 42–52)
HCT VFR BLD CALC: 32.4 % (ref 42–52)
HCT VFR BLD CALC: 32.5 % (ref 42–52)
HCT VFR BLD CALC: 34.3 % (ref 42–52)
HCT VFR BLD CALC: 34.6 % (ref 42–52)
HCT VFR BLD CALC: 34.7 % (ref 42–52)
HCT VFR BLD CALC: 35.5 % (ref 41–53)
HEMOGLOBIN: 10 GM/DL (ref 14–18)
HEMOGLOBIN: 10.4 G/DL (ref 13.5–17.5)
HEMOGLOBIN: 10.4 GM/DL (ref 14–18)
HEMOGLOBIN: 10.4 GM/DL (ref 14–18)
HEMOGLOBIN: 10.5 G/DL (ref 13.5–17.5)
HEMOGLOBIN: 10.8 G/DL (ref 13.5–17.5)
HEMOGLOBIN: 7.7 GM/DL (ref 14–18)
HEMOGLOBIN: 8 GM/DL (ref 14–18)
HEMOGLOBIN: 8.1 GM/DL (ref 14–18)
HEMOGLOBIN: 8.2 GM/DL (ref 14–18)
HEMOGLOBIN: 8.6 GM/DL (ref 14–18)
HEMOGLOBIN: 9.1 GM/DL (ref 14–18)
HEMOGLOBIN: 9.3 GM/DL (ref 14–18)
HEMOGLOBIN: 9.5 GM/DL (ref 14–18)
HEMOGLOBIN: 9.6 GM/DL (ref 14–18)
HEMOGLOBIN: 9.7 GM/DL (ref 14–18)
HEMOGLOBIN: 9.8 GM/DL (ref 14–18)
HYPOCHROMIA: PRESENT
IFIO2: 100
IFIO2: 95
IMMATURE GRANS (ABS): 0.17 THOU/MM3 (ref 0–0.07)
IMMATURE GRANS (ABS): 0.23 THOU/MM3 (ref 0–0.07)
IMMATURE GRANS (ABS): 0.36 THOU/MM3 (ref 0–0.07)
IMMATURE GRANULOCYTES: 1.9 %
IMMATURE GRANULOCYTES: 2.3 %
IMMATURE GRANULOCYTES: 3.7 %
INFLUENZA A BY PCR: NOT DETECTED
INFLUENZA B BY PCR: NOT DETECTED
INR BLD: 2.01 (ref 0.85–1.13)
INR BLD: 2.17 (ref 0.85–1.13)
INR BLD: 2.39 (ref 0.85–1.13)
INR BLD: 2.44 (ref 0.85–1.13)
INR BLD: 2.52 (ref 0.85–1.13)
INR BLD: 2.62
INR BLD: 3.02 (ref 0.85–1.13)
IRON: 67
KLEBSIELLA AEROGENES BY PCR: NOT DETECTED
KLEBSIELLA OXYTOCA BY PCR: NOT DETECTED
KLEBSIELLA PNEUMONIAE GROUP BY PCR: NOT DETECTED
LACTIC ACID: 1.2 MMOL/L (ref 0.5–2.2)
LACTIC ACID: 1.3 MMOL/L (ref 0.5–2.2)
LACTIC ACID: 1.5 MMOL/L (ref 0.5–2.2)
LACTIC ACID: 1.6 MMOL/L (ref 0.5–2.2)
LACTIC ACID: 1.6 MMOL/L (ref 0.5–2.2)
LACTIC ACID: 1.8 MMOL/L (ref 0.5–2.2)
LACTIC ACID: 2.5 MMOL/L (ref 0.5–2.2)
LEGIONELLA PNEUMOPHILIA BY PCR: NOT DETECTED
LIPASE: 18.9 U/L (ref 5.6–51.3)
LIPASE: 23.5 U/L (ref 5.6–51.3)
LIPASE: 30.3 U/L (ref 5.6–51.3)
LYMPHOCYTES # BLD: 11 %
LYMPHOCYTES # BLD: 31.5 %
LYMPHOCYTES # BLD: 8 %
LYMPHOCYTES ABSOLUTE: 0.8 THOU/MM3 (ref 1–4.8)
LYMPHOCYTES ABSOLUTE: 1 THOU/MM3 (ref 1–4.8)
LYMPHOCYTES ABSOLUTE: 3.1 THOU/MM3 (ref 1–4.8)
LYMPHOCYTES ABSOLUTE: ABNORMAL
LYMPHOCYTES RELATIVE PERCENT: ABNORMAL
MAGNESIUM: 1.8 MG/DL (ref 1.6–2.4)
MAGNESIUM: 1.9 MG/DL (ref 1.6–2.4)
MAGNESIUM: 2 MG/DL (ref 1.6–2.4)
MAGNESIUM: 2.1 MG/DL (ref 1.6–2.4)
MAGNESIUM: 2.2 MG/DL (ref 1.6–2.4)
MAGNESIUM: 2.3 MG/DL (ref 1.6–2.4)
MCH RBC QN AUTO: 27.1 PG (ref 26–33)
MCH RBC QN AUTO: 27.2 PG (ref 26–33)
MCH RBC QN AUTO: 27.2 PG (ref 26–33)
MCH RBC QN AUTO: 27.3 PG (ref 26–33)
MCH RBC QN AUTO: 27.4 PG (ref 26–33)
MCH RBC QN AUTO: 27.5 PG (ref 26–33)
MCH RBC QN AUTO: 27.5 PG (ref 26–33)
MCH RBC QN AUTO: 27.6 PG (ref 26–33)
MCH RBC QN AUTO: 27.6 PG (ref 26–33)
MCH RBC QN AUTO: 27.7 PG (ref 26–33)
MCH RBC QN AUTO: ABNORMAL PG
MCHC RBC AUTO-ENTMCNC: 28.1 GM/DL (ref 32.2–35.5)
MCHC RBC AUTO-ENTMCNC: 28.2 GM/DL (ref 32.2–35.5)
MCHC RBC AUTO-ENTMCNC: 28.3 GM/DL (ref 32.2–35.5)
MCHC RBC AUTO-ENTMCNC: 28.5 GM/DL (ref 32.2–35.5)
MCHC RBC AUTO-ENTMCNC: 28.9 GM/DL (ref 32.2–35.5)
MCHC RBC AUTO-ENTMCNC: 29.1 GM/DL (ref 32.2–35.5)
MCHC RBC AUTO-ENTMCNC: 29.2 GM/DL (ref 32.2–35.5)
MCHC RBC AUTO-ENTMCNC: 29.3 GM/DL (ref 32.2–35.5)
MCHC RBC AUTO-ENTMCNC: 29.9 GM/DL (ref 32.2–35.5)
MCHC RBC AUTO-ENTMCNC: 30 GM/DL (ref 32.2–35.5)
MCHC RBC AUTO-ENTMCNC: 30.2 GM/DL (ref 32.2–35.5)
MCHC RBC AUTO-ENTMCNC: 30.2 GM/DL (ref 32.2–35.5)
MCHC RBC AUTO-ENTMCNC: 30.3 GM/DL (ref 32.2–35.5)
MCHC RBC AUTO-ENTMCNC: 30.4 GM/DL (ref 32.2–35.5)
MCHC RBC AUTO-ENTMCNC: ABNORMAL G/DL
MCV RBC AUTO: 90 FL (ref 80–94)
MCV RBC AUTO: 90 FL (ref 80–94)
MCV RBC AUTO: 90.3 FL (ref 80–94)
MCV RBC AUTO: 90.6 FL (ref 80–94)
MCV RBC AUTO: 90.8 FL (ref 80–94)
MCV RBC AUTO: 91.8 FL (ref 80–94)
MCV RBC AUTO: 93 FL (ref 80–94)
MCV RBC AUTO: 94.2 FL (ref 80–94)
MCV RBC AUTO: 94.8 FL (ref 80–94)
MCV RBC AUTO: 94.9 FL (ref 80–94)
MCV RBC AUTO: 97.3 FL (ref 80–94)
MCV RBC AUTO: 97.9 FL (ref 80–94)
MCV RBC AUTO: 97.9 FL (ref 80–94)
MCV RBC AUTO: 98.4 FL (ref 80–94)
MCV RBC AUTO: ABNORMAL FL
METAPNEUMOVIRUS BY PCR: NOT DETECTED
MODE: ABNORMAL
MODE: ABNORMAL
MONOCYTES # BLD: 11.1 %
MONOCYTES # BLD: 11.2 %
MONOCYTES # BLD: 16.4 %
MONOCYTES ABSOLUTE: 1.1 THOU/MM3 (ref 0.4–1.3)
MONOCYTES ABSOLUTE: 1.1 THOU/MM3 (ref 0.4–1.3)
MONOCYTES ABSOLUTE: 1.5 THOU/MM3 (ref 0.4–1.3)
MONOCYTES ABSOLUTE: ABNORMAL
MONOCYTES RELATIVE PERCENT: ABNORMAL
MORAXELLA CATARRHALIS BY PCR: NOT DETECTED
MRSA SCREEN: NORMAL
MRSA SCREEN: NORMAL
MYCOPLASMA PNEUMONIAE BY PCR: NOT DETECTED
NEUTROPHILS ABSOLUTE: ABNORMAL
NEUTROPHILS RELATIVE PERCENT: ABNORMAL
NUCLEATED RED BLOOD CELLS: 1 /100 WBC
O2 SAT, MIXED: 20 %
O2 SATURATION: 100 %
O2 SATURATION: 100 %
ORGANISM: ABNORMAL
OSMOLALITY CALCULATION: 308.8 MOSMOL/KG (ref 275–300)
OSMOLALITY CALCULATION: 309.7 MOSMOL/KG (ref 275–300)
PARAINFLUENZA VIRUS BY PCR: NOT DETECTED
PATHOLOGIST REVIEW: ABNORMAL
PCO2, MIXED VENOUS: 100 MMHG (ref 41–51)
PCO2: 55 MMHG (ref 35–45)
PCO2: 73 MMHG (ref 35–45)
PH BLOOD GAS: 7.27 (ref 7.35–7.45)
PH BLOOD GAS: 7.38 (ref 7.35–7.45)
PH, MIXED: 7.11 (ref 7.31–7.41)
PHOSPHORUS: 3 MG/DL (ref 2.4–4.7)
PHOSPHORUS: 3.5 MG/DL (ref 2.4–4.7)
PHOSPHORUS: 3.6 MG/DL (ref 2.4–4.7)
PHOSPHORUS: 3.8 MG/DL (ref 2.4–4.7)
PHOSPHORUS: 3.9 MG/DL (ref 2.4–4.7)
PHOSPHORUS: 4 MG/DL (ref 2.4–4.7)
PHOSPHORUS: 4 MG/DL (ref 2.4–4.7)
PHOSPHORUS: 4.2 MG/DL (ref 2.4–4.7)
PHOSPHORUS: 4.3 MG/DL (ref 2.4–4.7)
PHOSPHORUS: 4.7 MG/DL (ref 2.4–4.7)
PIP: 32 CMH2O
PIP: 32 CMH2O
PLATELET # BLD: 155 THOU/MM3 (ref 130–400)
PLATELET # BLD: 159 THOU/MM3 (ref 130–400)
PLATELET # BLD: 161 THOU/MM3 (ref 130–400)
PLATELET # BLD: 163 THOU/MM3 (ref 130–400)
PLATELET # BLD: 164 THOU/MM3 (ref 130–400)
PLATELET # BLD: 172 THOU/MM3 (ref 130–400)
PLATELET # BLD: 174 THOU/MM3 (ref 130–400)
PLATELET # BLD: 182 THOU/MM3 (ref 130–400)
PLATELET # BLD: 187 THOU/MM3 (ref 130–400)
PLATELET # BLD: 187 THOU/MM3 (ref 130–400)
PLATELET # BLD: 193 THOU/MM3 (ref 130–400)
PLATELET # BLD: 193 THOU/MM3 (ref 130–400)
PLATELET # BLD: 265 THOU/MM3 (ref 130–400)
PLATELET # BLD: 267 THOU/MM3 (ref 130–400)
PLATELET # BLD: ABNORMAL 10*3/UL
PMV BLD AUTO: 10 FL (ref 9.4–12.4)
PMV BLD AUTO: 10 FL (ref 9.4–12.4)
PMV BLD AUTO: 10.3 FL (ref 9.4–12.4)
PMV BLD AUTO: 10.4 FL (ref 9.4–12.4)
PMV BLD AUTO: 10.4 FL (ref 9.4–12.4)
PMV BLD AUTO: 10.5 FL (ref 9.4–12.4)
PMV BLD AUTO: 10.6 FL (ref 9.4–12.4)
PMV BLD AUTO: 10.8 FL (ref 9.4–12.4)
PMV BLD AUTO: 9.1 FL (ref 9.4–12.4)
PMV BLD AUTO: 9.3 FL (ref 9.4–12.4)
PMV BLD AUTO: ABNORMAL FL
PO2 MIXED: 21 MMHG (ref 25–40)
PO2: 282 MMHG (ref 71–104)
PO2: 314 MMHG (ref 71–104)
POC CREATININE WHOLE BLOOD: 8.6 MG/DL (ref 0.5–1.2)
POC INR: 1.9 (ref 0.8–1.2)
POC INR: 2 (ref 0.8–1.2)
POC INR: 2.2 (ref 0.8–1.2)
POC INR: 2.4 (ref 0.8–1.2)
POC INR: 2.8 (ref 0.8–1.2)
POC INR: 3.6 (ref 0.8–1.2)
POC INR: 3.9 (ref 0.8–1.2)
POC LACTIC ACID: 7.4 MMOL/L (ref 0.5–1.9)
POTASSIUM REFLEX MAGNESIUM: 4.2 MEQ/L (ref 3.5–5.2)
POTASSIUM REFLEX MAGNESIUM: 5.1 MEQ/L (ref 3.5–5.2)
POTASSIUM REFLEX MAGNESIUM: 5.3 MEQ/L (ref 3.5–5.2)
POTASSIUM REFLEX MAGNESIUM: 5.6 MEQ/L (ref 3.5–5.2)
POTASSIUM SERPL-SCNC: 4.2 MEQ/L (ref 3.5–5.2)
POTASSIUM SERPL-SCNC: 4.6 MEQ/L (ref 3.5–5.2)
POTASSIUM SERPL-SCNC: 4.6 MEQ/L (ref 3.5–5.2)
POTASSIUM SERPL-SCNC: 4.9 MEQ/L (ref 3.5–5.2)
POTASSIUM SERPL-SCNC: 5 MEQ/L (ref 3.5–5.2)
POTASSIUM SERPL-SCNC: 5 MMOL/L
POTASSIUM SERPL-SCNC: 5.1 MEQ/L (ref 3.5–5.2)
POTASSIUM SERPL-SCNC: 5.1 MEQ/L (ref 3.5–5.2)
POTASSIUM SERPL-SCNC: 5.2 MEQ/L (ref 3.5–5.2)
POTASSIUM SERPL-SCNC: 5.6 MEQ/L (ref 3.5–5.2)
POTASSIUM, URINE: 6.6 MEQ/L
POTASSIUM, WHOLE BLOOD: 10.1 MEQ/L (ref 3.5–4.9)
PRO-BNP: 9499 PG/ML (ref 0–1800)
PRO-BNP: 9677 PG/ML (ref 0–1800)
PROCALCITONIN: 1.15 NG/ML (ref 0.01–0.09)
PROTEUS SPECIES BY PCR: NOT DETECTED
PSEUDOMONAS AERUGINOSA BY PCR: NOT DETECTED
PTH INTACT: 300
RBC # BLD: 2.84 MILL/MM3 (ref 4.7–6.1)
RBC # BLD: 2.91 MILL/MM3 (ref 4.7–6.1)
RBC # BLD: 2.93 MILL/MM3 (ref 4.7–6.1)
RBC # BLD: 2.96 MILL/MM3 (ref 4.7–6.1)
RBC # BLD: 3.1 MILL/MM3 (ref 4.7–6.1)
RBC # BLD: 3.29 MILL/MM3 (ref 4.7–6.1)
RBC # BLD: 3.4 MILL/MM3 (ref 4.7–6.1)
RBC # BLD: 3.44 MILL/MM3 (ref 4.7–6.1)
RBC # BLD: 3.51 MILL/MM3 (ref 4.7–6.1)
RBC # BLD: 3.53 MILL/MM3 (ref 4.7–6.1)
RBC # BLD: 3.6 MILL/MM3 (ref 4.7–6.1)
RBC # BLD: 3.65 MILL/MM3 (ref 4.7–6.1)
RBC # BLD: 3.81 MILL/MM3 (ref 4.7–6.1)
RBC # BLD: 3.82 MILL/MM3 (ref 4.7–6.1)
RBC # BLD: 4.26 10^6/ΜL
RESISTANT GENE CTX-M BY PCR: ABNORMAL
RESISTANT GENE IMP BY PCR: ABNORMAL
RESISTANT GENE KPC BY PCR: ABNORMAL
RESISTANT GENE MECA/C & MREJ BY PCR: NOT DETECTED
RESISTANT GENE NDM BY PCR: ABNORMAL
RESISTANT GENE OXA-48-LIKE BY PCR: ABNORMAL
RESISTANT GENE VIM BY PCR: ABNORMAL
RESPIRATORY CULTURE: ABNORMAL
RESPIRATORY CULTURE: ABNORMAL
RESPIRATORY SYNCYTIAL VIRUS BY PCR: NOT DETECTED
RETIC: 1.17
RH FACTOR: NORMAL
RHINOVIRUS ENTEROVIRUS PCR: NOT DETECTED
SARS-COV-2, NAAT: NOT DETECTED
SCAN OF BLOOD SMEAR: NORMAL
SEG NEUTROPHILS: 51.4 %
SEG NEUTROPHILS: 67 %
SEG NEUTROPHILS: 76.7 %
SEGMENTED NEUTROPHILS ABSOLUTE COUNT: 5 THOU/MM3 (ref 1.8–7.7)
SEGMENTED NEUTROPHILS ABSOLUTE COUNT: 6 THOU/MM3 (ref 1.8–7.7)
SEGMENTED NEUTROPHILS ABSOLUTE COUNT: 7.7 THOU/MM3 (ref 1.8–7.7)
SERRATIA MARCESCENS BY PCR: NOT DETECTED
SET PEEP: 10 MMHG
SET PEEP: 10 MMHG
SET RESPIRATORY RATE: 12 BPM
SET RESPIRATORY RATE: 12 BPM
SODIUM BLD-SCNC: 130 MEQ/L (ref 135–145)
SODIUM BLD-SCNC: 130 MEQ/L (ref 135–145)
SODIUM BLD-SCNC: 131 MEQ/L (ref 135–145)
SODIUM BLD-SCNC: 131 MEQ/L (ref 135–145)
SODIUM BLD-SCNC: 132 MEQ/L (ref 135–145)
SODIUM BLD-SCNC: 132 MEQ/L (ref 135–145)
SODIUM BLD-SCNC: 133 MEQ/L (ref 135–145)
SODIUM BLD-SCNC: 134 MMOL/L
SODIUM BLD-SCNC: 136 MEQ/L (ref 135–145)
SODIUM BLD-SCNC: 140 MEQ/L (ref 135–145)
SODIUM URINE: 141 MEQ/L
SODIUM, WHOLE BLOOD: 134 MEQ/L (ref 138–146)
SOURCE, BLOOD GAS: ABNORMAL
SOURCE, BLOOD GAS: ABNORMAL
SOURCE: ABNORMAL
SPECIMEN ACCEPTABILITY: ABNORMAL
STAPH AUREUS BY PCR: DETECTED
STREP AGALACTIAE BY PCR: NOT DETECTED
STREP PNEUMONIAE BY PCR: NOT DETECTED
STREP PYOGENES BY PCR: NOT DETECTED
TOTAL IRON BINDING CAPACITY: 230
TOTAL PROTEIN: 6.4 G/DL (ref 6.1–8)
TOTAL PROTEIN: 6.8 G/DL (ref 6.1–8)
TOTAL PROTEIN: 6.9 G/DL (ref 6.1–8)
TOTAL PROTEIN: 7 G/DL (ref 6.1–8)
TOTAL PROTEIN: 7.1 G/DL (ref 6.1–8)
TOTAL PROTEIN: 7.1 G/DL (ref 6.1–8)
TOTAL PROTEIN: 7.2 G/DL (ref 6.1–8)
TROPONIN T: 0.12 NG/ML
TROPONIN T: 0.13 NG/ML
TROPONIN T: 0.23 NG/ML
TROPONIN T: 0.33 NG/ML
TROPONIN T: 0.37 NG/ML
TROPONIN T: 0.44 NG/ML
TSH SERPL DL<=0.05 MIU/L-ACNC: 1.83 UIU/ML (ref 0.4–4.2)
URINE CULTURE, ROUTINE: NORMAL
VANCOMYCIN RESISTANT ENTEROCOCCUS: NEGATIVE
WBC # BLD: 10.1 THOU/MM3 (ref 4.8–10.8)
WBC # BLD: 13.9 THOU/MM3 (ref 4.8–10.8)
WBC # BLD: 14 THOU/MM3 (ref 4.8–10.8)
WBC # BLD: 14.5 THOU/MM3 (ref 4.8–10.8)
WBC # BLD: 15.6 THOU/MM3 (ref 4.8–10.8)
WBC # BLD: 16.3 THOU/MM3 (ref 4.8–10.8)
WBC # BLD: 17.2 THOU/MM3 (ref 4.8–10.8)
WBC # BLD: 17.2 THOU/MM3 (ref 4.8–10.8)
WBC # BLD: 17.4 THOU/MM3 (ref 4.8–10.8)
WBC # BLD: 18.5 THOU/MM3 (ref 4.8–10.8)
WBC # BLD: 19 THOU/MM3 (ref 4.8–10.8)
WBC # BLD: 20 THOU/MM3 (ref 4.8–10.8)
WBC # BLD: 7.58 10^3/ML
WBC # BLD: 8.9 THOU/MM3 (ref 4.8–10.8)
WBC # BLD: 9.7 THOU/MM3 (ref 4.8–10.8)

## 2020-01-01 PROCEDURE — 85610 PROTHROMBIN TIME: CPT

## 2020-01-01 PROCEDURE — 93294 REM INTERROG EVL PM/LDLS PM: CPT | Performed by: NUCLEAR MEDICINE

## 2020-01-01 PROCEDURE — 36415 COLL VENOUS BLD VENIPUNCTURE: CPT

## 2020-01-01 PROCEDURE — 6360000002 HC RX W HCPCS: Performed by: NURSE PRACTITIONER

## 2020-01-01 PROCEDURE — 84100 ASSAY OF PHOSPHORUS: CPT

## 2020-01-01 PROCEDURE — G0438 PPPS, INITIAL VISIT: HCPCS | Performed by: EMERGENCY MEDICINE

## 2020-01-01 PROCEDURE — 94761 N-INVAS EAR/PLS OXIMETRY MLT: CPT

## 2020-01-01 PROCEDURE — C9113 INJ PANTOPRAZOLE SODIUM, VIA: HCPCS | Performed by: NURSE PRACTITIONER

## 2020-01-01 PROCEDURE — 74018 RADEX ABDOMEN 1 VIEW: CPT

## 2020-01-01 PROCEDURE — 87205 SMEAR GRAM STAIN: CPT

## 2020-01-01 PROCEDURE — 85025 COMPLETE CBC W/AUTO DIFF WBC: CPT

## 2020-01-01 PROCEDURE — 85027 COMPLETE CBC AUTOMATED: CPT

## 2020-01-01 PROCEDURE — 2580000003 HC RX 258: Performed by: INTERNAL MEDICINE

## 2020-01-01 PROCEDURE — 36416 COLLJ CAPILLARY BLOOD SPEC: CPT

## 2020-01-01 PROCEDURE — 90935 HEMODIALYSIS ONE EVALUATION: CPT

## 2020-01-01 PROCEDURE — 99284 EMERGENCY DEPT VISIT MOD MDM: CPT

## 2020-01-01 PROCEDURE — 6370000000 HC RX 637 (ALT 250 FOR IP): Performed by: INTERNAL MEDICINE

## 2020-01-01 PROCEDURE — 82962 GLUCOSE BLOOD TEST: CPT | Performed by: EMERGENCY MEDICINE

## 2020-01-01 PROCEDURE — 69210 REMOVE IMPACTED EAR WAX UNI: CPT | Performed by: EMERGENCY MEDICINE

## 2020-01-01 PROCEDURE — 84484 ASSAY OF TROPONIN QUANT: CPT

## 2020-01-01 PROCEDURE — 90935 HEMODIALYSIS ONE EVALUATION: CPT | Performed by: INTERNAL MEDICINE

## 2020-01-01 PROCEDURE — 6370000000 HC RX 637 (ALT 250 FOR IP): Performed by: NURSE PRACTITIONER

## 2020-01-01 PROCEDURE — 82565 ASSAY OF CREATININE: CPT

## 2020-01-01 PROCEDURE — 0BH17EZ INSERTION OF ENDOTRACHEAL AIRWAY INTO TRACHEA, VIA NATURAL OR ARTIFICIAL OPENING: ICD-10-PCS | Performed by: EMERGENCY MEDICINE

## 2020-01-01 PROCEDURE — 83735 ASSAY OF MAGNESIUM: CPT

## 2020-01-01 PROCEDURE — G8427 DOCREV CUR MEDS BY ELIG CLIN: HCPCS | Performed by: NURSE PRACTITIONER

## 2020-01-01 PROCEDURE — 95819 EEG AWAKE AND ASLEEP: CPT

## 2020-01-01 PROCEDURE — 36600 WITHDRAWAL OF ARTERIAL BLOOD: CPT

## 2020-01-01 PROCEDURE — 2700000000 HC OXYGEN THERAPY PER DAY

## 2020-01-01 PROCEDURE — 71250 CT THORAX DX C-: CPT

## 2020-01-01 PROCEDURE — 94760 N-INVAS EAR/PLS OXIMETRY 1: CPT

## 2020-01-01 PROCEDURE — 94640 AIRWAY INHALATION TREATMENT: CPT

## 2020-01-01 PROCEDURE — 83605 ASSAY OF LACTIC ACID: CPT

## 2020-01-01 PROCEDURE — 1036F TOBACCO NON-USER: CPT | Performed by: EMERGENCY MEDICINE

## 2020-01-01 PROCEDURE — 99213 OFFICE O/P EST LOW 20 MIN: CPT | Performed by: EMERGENCY MEDICINE

## 2020-01-01 PROCEDURE — 82948 REAGENT STRIP/BLOOD GLUCOSE: CPT

## 2020-01-01 PROCEDURE — 1200000003 HC TELEMETRY R&B

## 2020-01-01 PROCEDURE — 82330 ASSAY OF CALCIUM: CPT

## 2020-01-01 PROCEDURE — G8417 CALC BMI ABV UP PARAM F/U: HCPCS | Performed by: EMERGENCY MEDICINE

## 2020-01-01 PROCEDURE — 94003 VENT MGMT INPAT SUBQ DAY: CPT

## 2020-01-01 PROCEDURE — 6360000002 HC RX W HCPCS: Performed by: EMERGENCY MEDICINE

## 2020-01-01 PROCEDURE — 99211 OFF/OP EST MAY X REQ PHY/QHP: CPT

## 2020-01-01 PROCEDURE — 1123F ACP DISCUSS/DSCN MKR DOCD: CPT | Performed by: EMERGENCY MEDICINE

## 2020-01-01 PROCEDURE — 1036F TOBACCO NON-USER: CPT | Performed by: NURSE PRACTITIONER

## 2020-01-01 PROCEDURE — 4040F PNEUMOC VAC/ADMIN/RCVD: CPT | Performed by: EMERGENCY MEDICINE

## 2020-01-01 PROCEDURE — 36620 INSERTION CATHETER ARTERY: CPT | Performed by: NURSE PRACTITIONER

## 2020-01-01 PROCEDURE — 6360000002 HC RX W HCPCS: Performed by: INTERNAL MEDICINE

## 2020-01-01 PROCEDURE — 84145 PROCALCITONIN (PCT): CPT

## 2020-01-01 PROCEDURE — C9254 INJECTION, LACOSAMIDE: HCPCS | Performed by: STUDENT IN AN ORGANIZED HEALTH CARE EDUCATION/TRAINING PROGRAM

## 2020-01-01 PROCEDURE — 87449 NOS EACH ORGANISM AG IA: CPT

## 2020-01-01 PROCEDURE — 6360000002 HC RX W HCPCS

## 2020-01-01 PROCEDURE — 36556 INSERT NON-TUNNEL CV CATH: CPT | Performed by: INTERNAL MEDICINE

## 2020-01-01 PROCEDURE — 2580000003 HC RX 258: Performed by: EMERGENCY MEDICINE

## 2020-01-01 PROCEDURE — 3E1M39Z IRRIGATION OF PERITONEAL CAVITY USING DIALYSATE, PERCUTANEOUS APPROACH: ICD-10-PCS | Performed by: INTERNAL MEDICINE

## 2020-01-01 PROCEDURE — 71045 X-RAY EXAM CHEST 1 VIEW: CPT

## 2020-01-01 PROCEDURE — 83036 HEMOGLOBIN GLYCOSYLATED A1C: CPT | Performed by: EMERGENCY MEDICINE

## 2020-01-01 PROCEDURE — 82570 ASSAY OF URINE CREATININE: CPT

## 2020-01-01 PROCEDURE — 2580000003 HC RX 258: Performed by: NURSE PRACTITIONER

## 2020-01-01 PROCEDURE — 93296 REM INTERROG EVL PM/IDS: CPT | Performed by: INTERNAL MEDICINE

## 2020-01-01 PROCEDURE — 1123F ACP DISCUSS/DSCN MKR DOCD: CPT | Performed by: NURSE PRACTITIONER

## 2020-01-01 PROCEDURE — 95816 EEG AWAKE AND DROWSY: CPT

## 2020-01-01 PROCEDURE — APPSS180 APP SPLIT SHARED TIME > 60 MINUTES: Performed by: NURSE PRACTITIONER

## 2020-01-01 PROCEDURE — G8427 DOCREV CUR MEDS BY ELIG CLIN: HCPCS | Performed by: EMERGENCY MEDICINE

## 2020-01-01 PROCEDURE — 95822 EEG COMA OR SLEEP ONLY: CPT | Performed by: PSYCHIATRY & NEUROLOGY

## 2020-01-01 PROCEDURE — 94002 VENT MGMT INPAT INIT DAY: CPT

## 2020-01-01 PROCEDURE — 80053 COMPREHEN METABOLIC PANEL: CPT

## 2020-01-01 PROCEDURE — G0378 HOSPITAL OBSERVATION PER HR: HCPCS

## 2020-01-01 PROCEDURE — 89220 SPUTUM SPECIMEN COLLECTION: CPT

## 2020-01-01 PROCEDURE — 2500000003 HC RX 250 WO HCPCS

## 2020-01-01 PROCEDURE — 90945 DIALYSIS ONE EVALUATION: CPT

## 2020-01-01 PROCEDURE — 5A1945Z RESPIRATORY VENTILATION, 24-96 CONSECUTIVE HOURS: ICD-10-PCS | Performed by: EMERGENCY MEDICINE

## 2020-01-01 PROCEDURE — 37799 UNLISTED PX VASCULAR SURGERY: CPT

## 2020-01-01 PROCEDURE — 6360000002 HC RX W HCPCS: Performed by: STUDENT IN AN ORGANIZED HEALTH CARE EDUCATION/TRAINING PROGRAM

## 2020-01-01 PROCEDURE — 2580000003 HC RX 258: Performed by: STUDENT IN AN ORGANIZED HEALTH CARE EDUCATION/TRAINING PROGRAM

## 2020-01-01 PROCEDURE — 96375 TX/PRO/DX INJ NEW DRUG ADDON: CPT

## 2020-01-01 PROCEDURE — 3023F SPIROM DOC REV: CPT | Performed by: NURSE PRACTITIONER

## 2020-01-01 PROCEDURE — 99291 CRITICAL CARE FIRST HOUR: CPT | Performed by: INTERNAL MEDICINE

## 2020-01-01 PROCEDURE — 82803 BLOOD GASES ANY COMBINATION: CPT

## 2020-01-01 PROCEDURE — 99496 TRANSJ CARE MGMT HIGH F2F 7D: CPT | Performed by: EMERGENCY MEDICINE

## 2020-01-01 PROCEDURE — 99213 OFFICE O/P EST LOW 20 MIN: CPT | Performed by: NURSE PRACTITIONER

## 2020-01-01 PROCEDURE — 02H633Z INSERTION OF INFUSION DEVICE INTO RIGHT ATRIUM, PERCUTANEOUS APPROACH: ICD-10-PCS | Performed by: INTERNAL MEDICINE

## 2020-01-01 PROCEDURE — 82947 ASSAY GLUCOSE BLOOD QUANT: CPT

## 2020-01-01 PROCEDURE — 95012 NITRIC OXIDE EXP GAS DETER: CPT | Performed by: NURSE PRACTITIONER

## 2020-01-01 PROCEDURE — 74176 CT ABD & PELVIS W/O CONTRAST: CPT

## 2020-01-01 PROCEDURE — 99232 SBSQ HOSP IP/OBS MODERATE 35: CPT | Performed by: PSYCHIATRY & NEUROLOGY

## 2020-01-01 PROCEDURE — 82150 ASSAY OF AMYLASE: CPT

## 2020-01-01 PROCEDURE — 87798 DETECT AGENT NOS DNA AMP: CPT

## 2020-01-01 PROCEDURE — 99233 SBSQ HOSP IP/OBS HIGH 50: CPT | Performed by: INTERNAL MEDICINE

## 2020-01-01 PROCEDURE — 36416 COLLJ CAPILLARY BLOOD SPEC: CPT | Performed by: PHARMACIST

## 2020-01-01 PROCEDURE — 86850 RBC ANTIBODY SCREEN: CPT

## 2020-01-01 PROCEDURE — 4040F PNEUMOC VAC/ADMIN/RCVD: CPT | Performed by: NURSE PRACTITIONER

## 2020-01-01 PROCEDURE — 99211 OFF/OP EST MAY X REQ PHY/QHP: CPT | Performed by: PHARMACIST

## 2020-01-01 PROCEDURE — 90945 DIALYSIS ONE EVALUATION: CPT | Performed by: INTERNAL MEDICINE

## 2020-01-01 PROCEDURE — 87077 CULTURE AEROBIC IDENTIFY: CPT

## 2020-01-01 PROCEDURE — 80048 BASIC METABOLIC PNL TOTAL CA: CPT

## 2020-01-01 PROCEDURE — 82435 ASSAY OF BLOOD CHLORIDE: CPT

## 2020-01-01 PROCEDURE — 6370000000 HC RX 637 (ALT 250 FOR IP)

## 2020-01-01 PROCEDURE — 87147 CULTURE TYPE IMMUNOLOGIC: CPT

## 2020-01-01 PROCEDURE — 86901 BLOOD TYPING SEROLOGIC RH(D): CPT

## 2020-01-01 PROCEDURE — 84133 ASSAY OF URINE POTASSIUM: CPT

## 2020-01-01 PROCEDURE — 87070 CULTURE OTHR SPECIMN AEROBIC: CPT

## 2020-01-01 PROCEDURE — 87631 RESP VIRUS 3-5 TARGETS: CPT

## 2020-01-01 PROCEDURE — 2500000003 HC RX 250 WO HCPCS: Performed by: NURSE PRACTITIONER

## 2020-01-01 PROCEDURE — G8417 CALC BMI ABV UP PARAM F/U: HCPCS | Performed by: NURSE PRACTITIONER

## 2020-01-01 PROCEDURE — G8926 SPIRO NO PERF OR DOC: HCPCS | Performed by: NURSE PRACTITIONER

## 2020-01-01 PROCEDURE — 99214 OFFICE O/P EST MOD 30 MIN: CPT | Performed by: NURSE PRACTITIONER

## 2020-01-01 PROCEDURE — G8484 FLU IMMUNIZE NO ADMIN: HCPCS | Performed by: NURSE PRACTITIONER

## 2020-01-01 PROCEDURE — 2500000003 HC RX 250 WO HCPCS: Performed by: INTERNAL MEDICINE

## 2020-01-01 PROCEDURE — 86900 BLOOD TYPING SEROLOGIC ABO: CPT

## 2020-01-01 PROCEDURE — 99285 EMERGENCY DEPT VISIT HI MDM: CPT

## 2020-01-01 PROCEDURE — 87541 LEGION PNEUMO DNA AMP PROB: CPT

## 2020-01-01 PROCEDURE — 93296 REM INTERROG EVL PM/IDS: CPT | Performed by: NUCLEAR MEDICINE

## 2020-01-01 PROCEDURE — 99223 1ST HOSP IP/OBS HIGH 75: CPT | Performed by: INTERNAL MEDICINE

## 2020-01-01 PROCEDURE — 84443 ASSAY THYROID STIM HORMONE: CPT

## 2020-01-01 PROCEDURE — 2580000003 HC RX 258

## 2020-01-01 PROCEDURE — 87500 VANOMYCIN DNA AMP PROBE: CPT

## 2020-01-01 PROCEDURE — 2000000000 HC ICU R&B

## 2020-01-01 PROCEDURE — 93005 ELECTROCARDIOGRAM TRACING: CPT | Performed by: NURSE PRACTITIONER

## 2020-01-01 PROCEDURE — 93005 ELECTROCARDIOGRAM TRACING: CPT | Performed by: EMERGENCY MEDICINE

## 2020-01-01 PROCEDURE — 87486 CHLMYD PNEUM DNA AMP PROBE: CPT

## 2020-01-01 PROCEDURE — 36592 COLLECT BLOOD FROM PICC: CPT

## 2020-01-01 PROCEDURE — 87086 URINE CULTURE/COLONY COUNT: CPT

## 2020-01-01 PROCEDURE — 85610 PROTHROMBIN TIME: CPT | Performed by: PHARMACIST

## 2020-01-01 PROCEDURE — 5A1D70Z PERFORMANCE OF URINARY FILTRATION, INTERMITTENT, LESS THAN 6 HOURS PER DAY: ICD-10-PCS | Performed by: INTERNAL MEDICINE

## 2020-01-01 PROCEDURE — 99238 HOSP IP/OBS DSCHRG MGMT 30/<: CPT | Performed by: INTERNAL MEDICINE

## 2020-01-01 PROCEDURE — 87081 CULTURE SCREEN ONLY: CPT

## 2020-01-01 PROCEDURE — 99292 CRITICAL CARE ADDL 30 MIN: CPT | Performed by: INTERNAL MEDICINE

## 2020-01-01 PROCEDURE — 70450 CT HEAD/BRAIN W/O DYE: CPT

## 2020-01-01 PROCEDURE — 83880 ASSAY OF NATRIURETIC PEPTIDE: CPT

## 2020-01-01 PROCEDURE — 2500000003 HC RX 250 WO HCPCS: Performed by: EMERGENCY MEDICINE

## 2020-01-01 PROCEDURE — 87040 BLOOD CULTURE FOR BACTERIA: CPT

## 2020-01-01 PROCEDURE — 87150 DNA/RNA AMPLIFIED PROBE: CPT

## 2020-01-01 PROCEDURE — 93294 REM INTERROG EVL PM/LDLS PM: CPT | Performed by: INTERNAL MEDICINE

## 2020-01-01 PROCEDURE — 84132 ASSAY OF SERUM POTASSIUM: CPT

## 2020-01-01 PROCEDURE — 1111F DSCHRG MED/CURRENT MED MERGE: CPT | Performed by: EMERGENCY MEDICINE

## 2020-01-01 PROCEDURE — 02HV33Z INSERTION OF INFUSION DEVICE INTO SUPERIOR VENA CAVA, PERCUTANEOUS APPROACH: ICD-10-PCS | Performed by: INTERNAL MEDICINE

## 2020-01-01 PROCEDURE — P9047 ALBUMIN (HUMAN), 25%, 50ML: HCPCS | Performed by: INTERNAL MEDICINE

## 2020-01-01 PROCEDURE — 36556 INSERT NON-TUNNEL CV CATH: CPT

## 2020-01-01 PROCEDURE — 83690 ASSAY OF LIPASE: CPT

## 2020-01-01 PROCEDURE — 84300 ASSAY OF URINE SODIUM: CPT

## 2020-01-01 PROCEDURE — 99223 1ST HOSP IP/OBS HIGH 75: CPT | Performed by: PSYCHIATRY & NEUROLOGY

## 2020-01-01 PROCEDURE — 87581 M.PNEUMON DNA AMP PROBE: CPT

## 2020-01-01 PROCEDURE — 90935 HEMODIALYSIS ONE EVALUATION: CPT | Performed by: NURSE PRACTITIONER

## 2020-01-01 PROCEDURE — 87186 SC STD MICRODIL/AGAR DIL: CPT

## 2020-01-01 PROCEDURE — U0002 COVID-19 LAB TEST NON-CDC: HCPCS

## 2020-01-01 PROCEDURE — 5A12012 PERFORMANCE OF CARDIAC OUTPUT, SINGLE, MANUAL: ICD-10-PCS | Performed by: INTERNAL MEDICINE

## 2020-01-01 PROCEDURE — 99291 CRITICAL CARE FIRST HOUR: CPT

## 2020-01-01 PROCEDURE — 99221 1ST HOSP IP/OBS SF/LOW 40: CPT | Performed by: INTERNAL MEDICINE

## 2020-01-01 PROCEDURE — 96374 THER/PROPH/DIAG INJ IV PUSH: CPT

## 2020-01-01 PROCEDURE — 31500 INSERT EMERGENCY AIRWAY: CPT

## 2020-01-01 PROCEDURE — 84295 ASSAY OF SERUM SODIUM: CPT

## 2020-01-01 RX ORDER — MIDODRINE HYDROCHLORIDE 10 MG/1
10 TABLET ORAL
Qty: 270 TABLET | Refills: 1 | Status: SHIPPED | OUTPATIENT
Start: 2020-01-01

## 2020-01-01 RX ORDER — ALBUTEROL SULFATE 2.5 MG/3ML
SOLUTION RESPIRATORY (INHALATION)
Status: COMPLETED
Start: 2020-01-01 | End: 2020-01-01

## 2020-01-01 RX ORDER — FLUDROCORTISONE ACETATE 0.1 MG/1
0.1 TABLET ORAL DAILY
Status: DISCONTINUED | OUTPATIENT
Start: 2020-01-01 | End: 2020-01-01 | Stop reason: HOSPADM

## 2020-01-01 RX ORDER — INSULIN GLARGINE 100 [IU]/ML
40 INJECTION, SOLUTION SUBCUTANEOUS NIGHTLY
Status: DISCONTINUED | OUTPATIENT
Start: 2020-01-01 | End: 2020-01-01 | Stop reason: HOSPADM

## 2020-01-01 RX ORDER — DEXTROSE MONOHYDRATE 25 G/50ML
12.5 INJECTION, SOLUTION INTRAVENOUS PRN
Status: DISCONTINUED | OUTPATIENT
Start: 2020-01-01 | End: 2020-01-01 | Stop reason: HOSPADM

## 2020-01-01 RX ORDER — FUROSEMIDE 10 MG/ML
40 INJECTION INTRAMUSCULAR; INTRAVENOUS ONCE
Status: COMPLETED | OUTPATIENT
Start: 2020-01-01 | End: 2020-01-01

## 2020-01-01 RX ORDER — INSULIN GLARGINE 100 [IU]/ML
50 INJECTION, SOLUTION SUBCUTANEOUS NIGHTLY
Status: DISCONTINUED | OUTPATIENT
Start: 2020-01-01 | End: 2020-01-01

## 2020-01-01 RX ORDER — DEXTROSE MONOHYDRATE 50 MG/ML
100 INJECTION, SOLUTION INTRAVENOUS PRN
Status: DISCONTINUED | OUTPATIENT
Start: 2020-01-01 | End: 2020-01-01 | Stop reason: HOSPADM

## 2020-01-01 RX ORDER — ALBUMIN (HUMAN) 12.5 G/50ML
25 SOLUTION INTRAVENOUS ONCE
Status: COMPLETED | OUTPATIENT
Start: 2020-01-01 | End: 2020-01-01

## 2020-01-01 RX ORDER — TIMOLOL MALEATE 5 MG/ML
1 SOLUTION/ DROPS OPHTHALMIC 2 TIMES DAILY
Status: DISCONTINUED | OUTPATIENT
Start: 2020-01-01 | End: 2020-01-01 | Stop reason: HOSPADM

## 2020-01-01 RX ORDER — PANTOPRAZOLE SODIUM 40 MG/10ML
40 INJECTION, POWDER, LYOPHILIZED, FOR SOLUTION INTRAVENOUS DAILY
Status: DISCONTINUED | OUTPATIENT
Start: 2020-01-01 | End: 2020-01-01

## 2020-01-01 RX ORDER — PROPOFOL 10 MG/ML
INJECTION, EMULSION INTRAVENOUS
Status: DISPENSED
Start: 2020-01-01 | End: 2020-01-01

## 2020-01-01 RX ORDER — INSULIN GLARGINE 100 [IU]/ML
50 INJECTION, SOLUTION SUBCUTANEOUS NIGHTLY
Qty: 15 PEN | Refills: 1 | Status: SHIPPED | OUTPATIENT
Start: 2020-01-01 | End: 2020-01-01 | Stop reason: SDUPTHER

## 2020-01-01 RX ORDER — DEXTROSE MONOHYDRATE 50 MG/ML
100 INJECTION, SOLUTION INTRAVENOUS PRN
Status: DISCONTINUED | OUTPATIENT
Start: 2020-01-01 | End: 2020-01-01

## 2020-01-01 RX ORDER — AMIODARONE HYDROCHLORIDE 50 MG/ML
INJECTION, SOLUTION INTRAVENOUS DAILY PRN
Status: COMPLETED | OUTPATIENT
Start: 2020-01-01 | End: 2020-01-01

## 2020-01-01 RX ORDER — ONDANSETRON 2 MG/ML
4 INJECTION INTRAMUSCULAR; INTRAVENOUS EVERY 6 HOURS PRN
Status: DISCONTINUED | OUTPATIENT
Start: 2020-01-01 | End: 2020-01-01

## 2020-01-01 RX ORDER — NICOTINE POLACRILEX 4 MG
15 LOZENGE BUCCAL PRN
Status: DISCONTINUED | OUTPATIENT
Start: 2020-01-01 | End: 2020-01-01

## 2020-01-01 RX ORDER — NOREPINEPHRINE BIT/0.9 % NACL 16MG/250ML
5 INFUSION BOTTLE (ML) INTRAVENOUS CONTINUOUS
Status: DISCONTINUED | OUTPATIENT
Start: 2020-01-01 | End: 2020-01-01

## 2020-01-01 RX ORDER — MIDAZOLAM HYDROCHLORIDE 2 MG/2ML
2 INJECTION, SOLUTION INTRAMUSCULAR; INTRAVENOUS ONCE
Status: COMPLETED | OUTPATIENT
Start: 2020-01-01 | End: 2020-01-01

## 2020-01-01 RX ORDER — ATORVASTATIN CALCIUM 40 MG/1
40 TABLET, FILM COATED ORAL DAILY
Status: DISCONTINUED | OUTPATIENT
Start: 2020-01-01 | End: 2020-01-01 | Stop reason: HOSPADM

## 2020-01-01 RX ORDER — DEXTROSE MONOHYDRATE 25 G/50ML
25 INJECTION, SOLUTION INTRAVENOUS ONCE
Status: DISCONTINUED | OUTPATIENT
Start: 2020-01-01 | End: 2020-01-01

## 2020-01-01 RX ORDER — WARFARIN SODIUM 2.5 MG/1
2.5 TABLET ORAL DAILY
Status: DISCONTINUED | OUTPATIENT
Start: 2020-01-01 | End: 2020-01-01

## 2020-01-01 RX ORDER — MIDODRINE HYDROCHLORIDE 10 MG/1
10 TABLET ORAL
Status: DISCONTINUED | OUTPATIENT
Start: 2020-01-01 | End: 2020-01-01 | Stop reason: HOSPADM

## 2020-01-01 RX ORDER — ASPIRIN 81 MG/1
81 TABLET, CHEWABLE ORAL DAILY
Status: DISCONTINUED | OUTPATIENT
Start: 2020-01-01 | End: 2020-01-01

## 2020-01-01 RX ORDER — ASPIRIN 81 MG/1
81 TABLET, CHEWABLE ORAL DAILY
Status: DISCONTINUED | OUTPATIENT
Start: 2020-01-01 | End: 2020-01-01 | Stop reason: HOSPADM

## 2020-01-01 RX ORDER — RENO CAPS 100; 1.5; 1.7; 20; 10; 1; 150; 5; 6 MG/1; MG/1; MG/1; MG/1; MG/1; MG/1; UG/1; MG/1; UG/1
CAPSULE ORAL
Qty: 90 CAPSULE | Refills: 0 | Status: SHIPPED | OUTPATIENT
Start: 2020-01-01 | End: 2020-01-01

## 2020-01-01 RX ORDER — INSULIN GLARGINE 100 [IU]/ML
30 INJECTION, SOLUTION SUBCUTANEOUS 2 TIMES DAILY
Status: DISCONTINUED | OUTPATIENT
Start: 2020-01-01 | End: 2020-01-01

## 2020-01-01 RX ORDER — SODIUM CHLORIDE 0.9 % (FLUSH) 0.9 %
10 SYRINGE (ML) INJECTION PRN
Status: DISCONTINUED | OUTPATIENT
Start: 2020-01-01 | End: 2020-01-01

## 2020-01-01 RX ORDER — INSULIN ASPART 100 [IU]/ML
INJECTION, SOLUTION INTRAVENOUS; SUBCUTANEOUS
Qty: 45 PEN | Refills: 3 | Status: SHIPPED | OUTPATIENT
Start: 2020-01-01

## 2020-01-01 RX ORDER — CALCIUM CHLORIDE 100 MG/ML
INJECTION INTRAVENOUS; INTRAVENTRICULAR
Status: COMPLETED
Start: 2020-01-01 | End: 2020-01-01

## 2020-01-01 RX ORDER — MORPHINE SULFATE 2 MG/ML
INJECTION, SOLUTION INTRAMUSCULAR; INTRAVENOUS
Status: COMPLETED
Start: 2020-01-01 | End: 2020-01-01

## 2020-01-01 RX ORDER — CHLORHEXIDINE GLUCONATE 0.12 MG/ML
15 RINSE ORAL 2 TIMES DAILY
Status: DISCONTINUED | OUTPATIENT
Start: 2020-01-01 | End: 2020-01-01

## 2020-01-01 RX ORDER — ATORVASTATIN CALCIUM 40 MG/1
40 TABLET, FILM COATED ORAL NIGHTLY
Status: DISCONTINUED | OUTPATIENT
Start: 2020-01-01 | End: 2020-01-01

## 2020-01-01 RX ORDER — ATORVASTATIN CALCIUM 40 MG/1
40 TABLET, FILM COATED ORAL DAILY
Qty: 90 TABLET | Refills: 1 | Status: SHIPPED | OUTPATIENT
Start: 2020-01-01

## 2020-01-01 RX ORDER — BLOOD SUGAR DIAGNOSTIC
STRIP MISCELLANEOUS
Qty: 300 STRIP | Refills: 3 | Status: SHIPPED | OUTPATIENT
Start: 2020-01-01

## 2020-01-01 RX ORDER — SODIUM CHLORIDE 0.9 % (FLUSH) 0.9 %
10 SYRINGE (ML) INJECTION EVERY 12 HOURS SCHEDULED
Status: DISCONTINUED | OUTPATIENT
Start: 2020-01-01 | End: 2020-01-01

## 2020-01-01 RX ORDER — ACETAMINOPHEN 325 MG/1
650 TABLET ORAL EVERY 6 HOURS PRN
Status: DISCONTINUED | OUTPATIENT
Start: 2020-01-01 | End: 2020-01-01

## 2020-01-01 RX ORDER — DEXTROSE MONOHYDRATE 25 G/50ML
12.5 INJECTION, SOLUTION INTRAVENOUS PRN
Status: DISCONTINUED | OUTPATIENT
Start: 2020-01-01 | End: 2020-01-01

## 2020-01-01 RX ORDER — NICOTINE POLACRILEX 4 MG
15 LOZENGE BUCCAL PRN
Status: DISCONTINUED | OUTPATIENT
Start: 2020-01-01 | End: 2020-01-01 | Stop reason: SDUPTHER

## 2020-01-01 RX ORDER — DEXTROSE MONOHYDRATE 25 G/50ML
12.5 INJECTION, SOLUTION INTRAVENOUS PRN
Status: DISCONTINUED | OUTPATIENT
Start: 2020-01-01 | End: 2020-01-01 | Stop reason: SDUPTHER

## 2020-01-01 RX ORDER — CALCIUM CHLORIDE, MAGNESIUM CHLORIDE, DEXTROSE MONOHYDRATE, LACTIC ACID, SODIUM CHLORIDE, SODIUM BICARBONATE AND POTASSIUM CHLORIDE 5.15; 2.03; 22; 5.4; 6.46; 3.09; .157 G/L; G/L; G/L; G/L; G/L; G/L; G/L
INJECTION INTRAVENOUS CONTINUOUS
Status: DISCONTINUED | OUTPATIENT
Start: 2020-01-01 | End: 2020-01-01

## 2020-01-01 RX ORDER — WARFARIN SODIUM 3 MG/1
TABLET ORAL
Qty: 200 TABLET | Refills: 1 | Status: SHIPPED | OUTPATIENT
Start: 2020-01-01

## 2020-01-01 RX ORDER — FLUDROCORTISONE ACETATE 0.1 MG/1
0.1 TABLET ORAL DAILY
Qty: 90 TABLET | Refills: 1 | Status: SHIPPED | OUTPATIENT
Start: 2020-01-01

## 2020-01-01 RX ORDER — POLYETHYLENE GLYCOL 3350 17 G/17G
17 POWDER, FOR SOLUTION ORAL DAILY PRN
Status: DISCONTINUED | OUTPATIENT
Start: 2020-01-01 | End: 2020-01-01

## 2020-01-01 RX ORDER — ISOSORBIDE MONONITRATE 30 MG/1
30 TABLET, EXTENDED RELEASE ORAL DAILY
Qty: 90 TABLET | Refills: 1 | Status: SHIPPED | OUTPATIENT
Start: 2020-01-01 | End: 2020-01-01 | Stop reason: SDUPTHER

## 2020-01-01 RX ORDER — TAMSULOSIN HYDROCHLORIDE 0.4 MG/1
0.4 CAPSULE ORAL DAILY
Qty: 90 CAPSULE | Refills: 1 | Status: SHIPPED | OUTPATIENT
Start: 2020-01-01

## 2020-01-01 RX ORDER — INSULIN GLARGINE 100 [IU]/ML
50 INJECTION, SOLUTION SUBCUTANEOUS NIGHTLY
Qty: 5 PEN | Refills: 1 | Status: SHIPPED | OUTPATIENT
Start: 2020-01-01

## 2020-01-01 RX ORDER — SODIUM POLYSTYRENE SULFONATE 15 G/60ML
15 SUSPENSION ORAL; RECTAL ONCE
Status: COMPLETED | OUTPATIENT
Start: 2020-01-01 | End: 2020-01-01

## 2020-01-01 RX ORDER — FLUTICASONE PROPIONATE 50 MCG
2 SPRAY, SUSPENSION (ML) NASAL DAILY
Qty: 1 BOTTLE | Refills: 3 | Status: SHIPPED | OUTPATIENT
Start: 2020-01-01 | End: 2020-01-01 | Stop reason: SDUPTHER

## 2020-01-01 RX ORDER — 0.9 % SODIUM CHLORIDE 0.9 %
250 INTRAVENOUS SOLUTION INTRAVENOUS ONCE
Status: COMPLETED | OUTPATIENT
Start: 2020-01-01 | End: 2020-01-01

## 2020-01-01 RX ORDER — DEXTROSE MONOHYDRATE 50 MG/ML
100 INJECTION, SOLUTION INTRAVENOUS PRN
Status: DISCONTINUED | OUTPATIENT
Start: 2020-01-01 | End: 2020-01-01 | Stop reason: SDUPTHER

## 2020-01-01 RX ORDER — MIDODRINE HYDROCHLORIDE 10 MG/1
10 TABLET ORAL
Status: ON HOLD | COMMUNITY
End: 2020-01-01 | Stop reason: HOSPADM

## 2020-01-01 RX ORDER — MIDODRINE HYDROCHLORIDE 10 MG/1
10 TABLET ORAL ONCE
Status: COMPLETED | OUTPATIENT
Start: 2020-01-01 | End: 2020-01-01

## 2020-01-01 RX ORDER — MIDAZOLAM HYDROCHLORIDE 2 MG/2ML
2 INJECTION, SOLUTION INTRAMUSCULAR; INTRAVENOUS
Status: DISPENSED | OUTPATIENT
Start: 2020-01-01 | End: 2020-01-01

## 2020-01-01 RX ORDER — ACETAMINOPHEN 325 MG/1
650 TABLET ORAL EVERY 4 HOURS
Status: DISCONTINUED | OUTPATIENT
Start: 2020-01-01 | End: 2020-01-01 | Stop reason: HOSPADM

## 2020-01-01 RX ORDER — CALCIUM CHLORIDE 100 MG/ML
INJECTION INTRAVENOUS; INTRAVENTRICULAR DAILY PRN
Status: COMPLETED | OUTPATIENT
Start: 2020-01-01 | End: 2020-01-01

## 2020-01-01 RX ORDER — ACETAMINOPHEN 650 MG/1
650 SUPPOSITORY RECTAL EVERY 6 HOURS PRN
Status: DISCONTINUED | OUTPATIENT
Start: 2020-01-01 | End: 2020-01-01

## 2020-01-01 RX ORDER — MORPHINE SULFATE 2 MG/ML
2 INJECTION, SOLUTION INTRAMUSCULAR; INTRAVENOUS
Status: DISCONTINUED | OUTPATIENT
Start: 2020-01-01 | End: 2020-01-01 | Stop reason: HOSPADM

## 2020-01-01 RX ORDER — MIDODRINE HYDROCHLORIDE 5 MG/1
5 TABLET ORAL 3 TIMES DAILY
Status: DISCONTINUED | OUTPATIENT
Start: 2020-01-01 | End: 2020-01-01 | Stop reason: HOSPADM

## 2020-01-01 RX ORDER — PROMETHAZINE HYDROCHLORIDE 25 MG/1
12.5 TABLET ORAL EVERY 6 HOURS PRN
Status: DISCONTINUED | OUTPATIENT
Start: 2020-01-01 | End: 2020-01-01

## 2020-01-01 RX ORDER — INSULIN GLARGINE 100 [IU]/ML
50 INJECTION, SOLUTION SUBCUTANEOUS NIGHTLY
Qty: 5 PEN | Refills: 1 | Status: SHIPPED | OUTPATIENT
Start: 2020-01-01 | End: 2020-01-01 | Stop reason: SDUPTHER

## 2020-01-01 RX ORDER — MIDODRINE HYDROCHLORIDE 10 MG/1
10 TABLET ORAL
Qty: 270 TABLET | Refills: 1
Start: 2020-01-01 | End: 2020-01-01 | Stop reason: SDUPTHER

## 2020-01-01 RX ORDER — WARFARIN SODIUM 3 MG/1
6 TABLET ORAL ONCE
Status: COMPLETED | OUTPATIENT
Start: 2020-01-01 | End: 2020-01-01

## 2020-01-01 RX ORDER — ACETAMINOPHEN 325 MG/1
650 TABLET ORAL EVERY 4 HOURS
Qty: 120 TABLET | Refills: 3 | Status: SHIPPED | OUTPATIENT
Start: 2020-01-01

## 2020-01-01 RX ORDER — ISOSORBIDE MONONITRATE 30 MG/1
30 TABLET, EXTENDED RELEASE ORAL DAILY
Qty: 90 TABLET | Refills: 1 | Status: SHIPPED | OUTPATIENT
Start: 2020-01-01

## 2020-01-01 RX ORDER — FUROSEMIDE 10 MG/ML
INJECTION INTRAMUSCULAR; INTRAVENOUS DAILY PRN
Status: COMPLETED | OUTPATIENT
Start: 2020-01-01 | End: 2020-01-01

## 2020-01-01 RX ORDER — PROPOFOL 10 MG/ML
10 INJECTION, EMULSION INTRAVENOUS
Status: DISCONTINUED | OUTPATIENT
Start: 2020-01-01 | End: 2020-01-01 | Stop reason: HOSPADM

## 2020-01-01 RX ORDER — EPINEPHRINE 0.1 MG/ML
SYRINGE (ML) INJECTION DAILY PRN
Status: COMPLETED | OUTPATIENT
Start: 2020-01-01 | End: 2020-01-01

## 2020-01-01 RX ORDER — DEXTROSE MONOHYDRATE 25 G/50ML
INJECTION, SOLUTION INTRAVENOUS DAILY PRN
Status: COMPLETED | OUTPATIENT
Start: 2020-01-01 | End: 2020-01-01

## 2020-01-01 RX ORDER — FOLIC ACID/VIT B COMPLEX AND C 5 MG
1 TABLET ORAL DAILY
Status: DISCONTINUED | OUTPATIENT
Start: 2020-01-01 | End: 2020-01-01 | Stop reason: HOSPADM

## 2020-01-01 RX ORDER — IPRATROPIUM BROMIDE AND ALBUTEROL SULFATE 2.5; .5 MG/3ML; MG/3ML
1 SOLUTION RESPIRATORY (INHALATION)
Status: DISCONTINUED | OUTPATIENT
Start: 2020-01-01 | End: 2020-01-01 | Stop reason: HOSPADM

## 2020-01-01 RX ORDER — NICOTINE POLACRILEX 4 MG
15 LOZENGE BUCCAL PRN
Status: DISCONTINUED | OUTPATIENT
Start: 2020-01-01 | End: 2020-01-01 | Stop reason: HOSPADM

## 2020-01-01 RX ORDER — SODIUM POLYSTYRENE SULFONATE 15 G/60ML
SUSPENSION ORAL; RECTAL
Status: COMPLETED
Start: 2020-01-01 | End: 2020-01-01

## 2020-01-01 RX ORDER — INSULIN GLARGINE 100 [IU]/ML
50 INJECTION, SOLUTION SUBCUTANEOUS NIGHTLY
Qty: 15 PEN | Refills: 1 | Status: SHIPPED | OUTPATIENT
Start: 2020-01-01 | End: 2020-01-01

## 2020-01-01 RX ORDER — BUDESONIDE AND FORMOTEROL FUMARATE DIHYDRATE 80; 4.5 UG/1; UG/1
2 AEROSOL RESPIRATORY (INHALATION) 2 TIMES DAILY
Qty: 1 INHALER | Refills: 3 | Status: SHIPPED | OUTPATIENT
Start: 2020-01-01 | End: 2020-01-01

## 2020-01-01 RX ORDER — WARFARIN SODIUM 3 MG/1
6 TABLET ORAL DAILY
Status: DISCONTINUED | OUTPATIENT
Start: 2020-01-01 | End: 2020-01-01

## 2020-01-01 RX ORDER — AMMONIUM LACTATE 12 G/100G
LOTION TOPICAL PRN
Status: DISCONTINUED | OUTPATIENT
Start: 2020-01-01 | End: 2020-01-01 | Stop reason: HOSPADM

## 2020-01-01 RX ADMIN — Medication 2500 ML/HR: at 13:59

## 2020-01-01 RX ADMIN — Medication 2500 ML/HR: at 07:08

## 2020-01-01 RX ADMIN — Medication: at 10:30

## 2020-01-01 RX ADMIN — ASPIRIN 81 MG: 81 TABLET, CHEWABLE ORAL at 08:15

## 2020-01-01 RX ADMIN — INSULIN LISPRO 6 UNITS: 100 INJECTION, SOLUTION INTRAVENOUS; SUBCUTANEOUS at 06:23

## 2020-01-01 RX ADMIN — CALCIUM CHLORIDE 1 G: 100 INJECTION, SOLUTION INTRAVENOUS at 09:45

## 2020-01-01 RX ADMIN — Medication 2500 ML/HR: at 03:34

## 2020-01-01 RX ADMIN — CALCIUM CHLORIDE, MAGNESIUM CHLORIDE, DEXTROSE MONOHYDRATE, LACTIC ACID, SODIUM CHLORIDE, SODIUM BICARBONATE AND POTASSIUM CHLORIDE 1000 ML/HR: 5.15; 2.03; 22; 5.4; 6.46; 3.09; .157 INJECTION INTRAVENOUS at 19:47

## 2020-01-01 RX ADMIN — Medication 2500 ML/HR: at 11:54

## 2020-01-01 RX ADMIN — PROPOFOL 30 MCG/KG/MIN: 10 INJECTION, EMULSION INTRAVENOUS at 17:20

## 2020-01-01 RX ADMIN — MIDODRINE HYDROCHLORIDE 5 MG: 5 TABLET ORAL at 19:25

## 2020-01-01 RX ADMIN — MORPHINE SULFATE 2 MG: 2 INJECTION, SOLUTION INTRAMUSCULAR; INTRAVENOUS at 12:39

## 2020-01-01 RX ADMIN — AMIODARONE HYDROCHLORIDE 1 MG/MIN: 1.8 INJECTION, SOLUTION INTRAVENOUS at 10:40

## 2020-01-01 RX ADMIN — CALCIUM GLUCONATE 2 G: 98 INJECTION, SOLUTION INTRAVENOUS at 22:44

## 2020-01-01 RX ADMIN — LEVETIRACETAM 500 MG: 100 INJECTION, SOLUTION INTRAVENOUS at 18:06

## 2020-01-01 RX ADMIN — Medication 50 MEQ: at 09:45

## 2020-01-01 RX ADMIN — AMIODARONE HYDROCHLORIDE 0.5 MG/MIN: 1.8 INJECTION, SOLUTION INTRAVENOUS at 17:00

## 2020-01-01 RX ADMIN — Medication 50 MCG/MIN: at 21:12

## 2020-01-01 RX ADMIN — ATORVASTATIN CALCIUM 40 MG: 40 TABLET, FILM COATED ORAL at 13:12

## 2020-01-01 RX ADMIN — Medication 2500 ML/HR: at 01:09

## 2020-01-01 RX ADMIN — MIDODRINE HYDROCHLORIDE 5 MG: 5 TABLET ORAL at 07:29

## 2020-01-01 RX ADMIN — Medication 15 ML: at 21:35

## 2020-01-01 RX ADMIN — DEXTROSE MONOHYDRATE 100 MG: 50 INJECTION, SOLUTION INTRAVENOUS at 21:25

## 2020-01-01 RX ADMIN — PROPOFOL 15 MCG/KG/MIN: 10 INJECTION, EMULSION INTRAVENOUS at 11:01

## 2020-01-01 RX ADMIN — ALBUTEROL SULFATE 15 MG: 2.5 SOLUTION RESPIRATORY (INHALATION) at 10:13

## 2020-01-01 RX ADMIN — Medication 15 ML: at 08:52

## 2020-01-01 RX ADMIN — ATORVASTATIN CALCIUM 40 MG: 40 TABLET, FILM COATED ORAL at 22:43

## 2020-01-01 RX ADMIN — SODIUM CHLORIDE, PRESERVATIVE FREE 10 ML: 5 INJECTION INTRAVENOUS at 08:52

## 2020-01-01 RX ADMIN — IPRATROPIUM BROMIDE AND ALBUTEROL SULFATE 1 AMPULE: .5; 3 SOLUTION RESPIRATORY (INHALATION) at 09:23

## 2020-01-01 RX ADMIN — SODIUM CHLORIDE, PRESERVATIVE FREE 10 ML: 5 INJECTION INTRAVENOUS at 08:15

## 2020-01-01 RX ADMIN — VASOPRESSIN 0.03 UNITS/MIN: 20 INJECTION INTRAVENOUS at 19:18

## 2020-01-01 RX ADMIN — PANTOPRAZOLE SODIUM 40 MG: 40 INJECTION, POWDER, FOR SOLUTION INTRAVENOUS at 12:39

## 2020-01-01 RX ADMIN — CALCIUM CHLORIDE, MAGNESIUM CHLORIDE, DEXTROSE MONOHYDRATE, LACTIC ACID, SODIUM CHLORIDE, SODIUM BICARBONATE AND POTASSIUM CHLORIDE 500 ML/HR: 5.15; 2.03; 22; 5.4; 6.46; 3.09; .157 INJECTION INTRAVENOUS at 11:08

## 2020-01-01 RX ADMIN — CALCIUM CHLORIDE, MAGNESIUM CHLORIDE, DEXTROSE MONOHYDRATE, LACTIC ACID, SODIUM CHLORIDE, SODIUM BICARBONATE AND POTASSIUM CHLORIDE 500 ML/HR: 5.15; 2.03; 22; 5.4; 6.46; 3.09; .157 INJECTION INTRAVENOUS at 01:05

## 2020-01-01 RX ADMIN — Medication 1 MG: at 09:46

## 2020-01-01 RX ADMIN — INSULIN HUMAN 10 UNITS: 100 INJECTION, SOLUTION PARENTERAL at 04:22

## 2020-01-01 RX ADMIN — ACETAMINOPHEN 650 MG: 325 TABLET ORAL at 01:38

## 2020-01-01 RX ADMIN — PIPERACILLIN AND TAZOBACTAM 3.38 G: 3; .375 INJECTION, POWDER, LYOPHILIZED, FOR SOLUTION INTRAVENOUS at 18:04

## 2020-01-01 RX ADMIN — Medication 50 MEQ: at 10:04

## 2020-01-01 RX ADMIN — PROPOFOL 40 MCG/KG/MIN: 10 INJECTION, EMULSION INTRAVENOUS at 01:23

## 2020-01-01 RX ADMIN — AMIODARONE HYDROCHLORIDE 0.5 MG/MIN: 1.8 INJECTION, SOLUTION INTRAVENOUS at 04:57

## 2020-01-01 RX ADMIN — Medication 50 MCG/MIN: at 11:33

## 2020-01-01 RX ADMIN — Medication 38 MCG/MIN: at 04:21

## 2020-01-01 RX ADMIN — PIPERACILLIN AND TAZOBACTAM 3.38 G: 3; .375 INJECTION, POWDER, LYOPHILIZED, FOR SOLUTION INTRAVENOUS at 18:07

## 2020-01-01 RX ADMIN — MIDAZOLAM HYDROCHLORIDE 2 MG: 1 INJECTION, SOLUTION INTRAMUSCULAR; INTRAVENOUS at 00:40

## 2020-01-01 RX ADMIN — SODIUM CHLORIDE, PRESERVATIVE FREE 10 ML: 5 INJECTION INTRAVENOUS at 21:35

## 2020-01-01 RX ADMIN — Medication 2 PUFF: at 09:31

## 2020-01-01 RX ADMIN — PIPERACILLIN AND TAZOBACTAM 3.38 G: 3; .375 INJECTION, POWDER, LYOPHILIZED, FOR SOLUTION INTRAVENOUS at 08:09

## 2020-01-01 RX ADMIN — PROPOFOL 35 MCG/KG/MIN: 10 INJECTION, EMULSION INTRAVENOUS at 10:30

## 2020-01-01 RX ADMIN — PROPOFOL 35 MCG/KG/MIN: 10 INJECTION, EMULSION INTRAVENOUS at 08:14

## 2020-01-01 RX ADMIN — INSULIN LISPRO 7 UNITS: 100 INJECTION, SOLUTION INTRAVENOUS; SUBCUTANEOUS at 20:07

## 2020-01-01 RX ADMIN — MIDODRINE HYDROCHLORIDE 10 MG: 10 TABLET ORAL at 07:29

## 2020-01-01 RX ADMIN — PHENYLEPHRINE HYDROCHLORIDE 200 MCG/MIN: 10 INJECTION INTRAVENOUS at 06:55

## 2020-01-01 RX ADMIN — CALCIUM CHLORIDE: 100 INJECTION, SOLUTION INTRAVENOUS at 10:00

## 2020-01-01 RX ADMIN — Medication 2500 ML/HR: at 21:17

## 2020-01-01 RX ADMIN — CALCIUM CHLORIDE: 100 INJECTION, SOLUTION INTRAVENOUS at 11:15

## 2020-01-01 RX ADMIN — MIDAZOLAM HYDROCHLORIDE 2 MG/HR: 5 INJECTION, SOLUTION INTRAMUSCULAR; INTRAVENOUS at 20:26

## 2020-01-01 RX ADMIN — INSULIN LISPRO 2 UNITS: 100 INJECTION, SOLUTION INTRAVENOUS; SUBCUTANEOUS at 22:56

## 2020-01-01 RX ADMIN — Medication 2500 ML/HR: at 09:51

## 2020-01-01 RX ADMIN — ATORVASTATIN CALCIUM 40 MG: 40 TABLET, FILM COATED ORAL at 15:07

## 2020-01-01 RX ADMIN — PIPERACILLIN AND TAZOBACTAM 3.38 G: 3; .375 INJECTION, POWDER, LYOPHILIZED, FOR SOLUTION INTRAVENOUS at 05:45

## 2020-01-01 RX ADMIN — PHENYLEPHRINE HYDROCHLORIDE 200 MCG/MIN: 10 INJECTION INTRAVENOUS at 16:47

## 2020-01-01 RX ADMIN — INSULIN GLARGINE 40 UNITS: 100 INJECTION, SOLUTION SUBCUTANEOUS at 20:06

## 2020-01-01 RX ADMIN — DEXTROSE MONOHYDRATE 200 MG: 50 INJECTION, SOLUTION INTRAVENOUS at 16:25

## 2020-01-01 RX ADMIN — CALCIUM GLUCONATE 2 G: 98 INJECTION, SOLUTION INTRAVENOUS at 20:32

## 2020-01-01 RX ADMIN — CALCIUM CHLORIDE, MAGNESIUM CHLORIDE, DEXTROSE MONOHYDRATE, LACTIC ACID, SODIUM CHLORIDE, SODIUM BICARBONATE AND POTASSIUM CHLORIDE 1000 ML/HR: 5.15; 2.03; 22; 5.4; 6.46; 3.09; .157 INJECTION INTRAVENOUS at 02:47

## 2020-01-01 RX ADMIN — INSULIN GLARGINE 30 UNITS: 100 INJECTION, SOLUTION SUBCUTANEOUS at 08:16

## 2020-01-01 RX ADMIN — DEXTROSE MONOHYDRATE 100 MG: 50 INJECTION, SOLUTION INTRAVENOUS at 08:59

## 2020-01-01 RX ADMIN — SODIUM CHLORIDE 250 ML: 9 INJECTION, SOLUTION INTRAVENOUS at 11:59

## 2020-01-01 RX ADMIN — CALCIUM CHLORIDE 1 G: 100 INJECTION, SOLUTION INTRAVENOUS at 09:48

## 2020-01-01 RX ADMIN — MIDODRINE HYDROCHLORIDE 10 MG: 10 TABLET ORAL at 06:22

## 2020-01-01 RX ADMIN — TIMOLOL MALEATE 1 DROP: 5 SOLUTION/ DROPS OPHTHALMIC at 19:25

## 2020-01-01 RX ADMIN — Medication 15 ML: at 08:14

## 2020-01-01 RX ADMIN — AMIODARONE HYDROCHLORIDE 0.5 MG/MIN: 1.8 INJECTION, SOLUTION INTRAVENOUS at 17:21

## 2020-01-01 RX ADMIN — IPRATROPIUM BROMIDE AND ALBUTEROL SULFATE 1 AMPULE: .5; 3 SOLUTION RESPIRATORY (INHALATION) at 13:59

## 2020-01-01 RX ADMIN — ALBUMIN (HUMAN) 25 G: 0.25 INJECTION, SOLUTION INTRAVENOUS at 13:45

## 2020-01-01 RX ADMIN — CALCIUM CHLORIDE, MAGNESIUM CHLORIDE, DEXTROSE MONOHYDRATE, LACTIC ACID, SODIUM CHLORIDE, SODIUM BICARBONATE AND POTASSIUM CHLORIDE 1000 ML/HR: 5.15; 2.03; 22; 5.4; 6.46; 3.09; .157 INJECTION INTRAVENOUS at 11:47

## 2020-01-01 RX ADMIN — PROPOFOL 40 MCG/KG/MIN: 10 INJECTION, EMULSION INTRAVENOUS at 04:19

## 2020-01-01 RX ADMIN — CALCIUM CHLORIDE, MAGNESIUM CHLORIDE, DEXTROSE MONOHYDRATE, LACTIC ACID, SODIUM CHLORIDE, SODIUM BICARBONATE AND POTASSIUM CHLORIDE 1000 ML/HR: 5.15; 2.03; 22; 5.4; 6.46; 3.09; .157 INJECTION INTRAVENOUS at 20:10

## 2020-01-01 RX ADMIN — CALCIUM CHLORIDE, MAGNESIUM CHLORIDE, DEXTROSE MONOHYDRATE, LACTIC ACID, SODIUM CHLORIDE, SODIUM BICARBONATE AND POTASSIUM CHLORIDE 1000 ML/HR: 5.15; 2.03; 22; 5.4; 6.46; 3.09; .157 INJECTION INTRAVENOUS at 14:36

## 2020-01-01 RX ADMIN — Medication 50 MEQ: at 09:56

## 2020-01-01 RX ADMIN — PROPOFOL 40 MCG/KG/MIN: 10 INJECTION, EMULSION INTRAVENOUS at 07:11

## 2020-01-01 RX ADMIN — PHENYLEPHRINE HYDROCHLORIDE 50 MCG/MIN: 10 INJECTION INTRAVENOUS at 09:15

## 2020-01-01 RX ADMIN — MIDAZOLAM HYDROCHLORIDE 2 MG: 1 INJECTION, SOLUTION INTRAMUSCULAR; INTRAVENOUS at 10:50

## 2020-01-01 RX ADMIN — PROPOFOL 15 MCG/KG/MIN: 10 INJECTION, EMULSION INTRAVENOUS at 02:48

## 2020-01-01 RX ADMIN — Medication 15 ML: at 09:22

## 2020-01-01 RX ADMIN — HEPARIN SODIUM: 1000 INJECTION INTRAVENOUS; SUBCUTANEOUS at 11:35

## 2020-01-01 RX ADMIN — PROPOFOL 20 MCG/KG/MIN: 10 INJECTION, EMULSION INTRAVENOUS at 19:16

## 2020-01-01 RX ADMIN — Medication 2500 ML/HR: at 17:02

## 2020-01-01 RX ADMIN — PIPERACILLIN AND TAZOBACTAM 3.38 G: 3; .375 INJECTION, POWDER, LYOPHILIZED, FOR SOLUTION INTRAVENOUS at 17:47

## 2020-01-01 RX ADMIN — PHENYLEPHRINE HYDROCHLORIDE 300 MCG/MIN: 10 INJECTION INTRAVENOUS at 23:26

## 2020-01-01 RX ADMIN — PROPOFOL 35 MCG/KG/MIN: 10 INJECTION, EMULSION INTRAVENOUS at 16:20

## 2020-01-01 RX ADMIN — MIDODRINE HYDROCHLORIDE 10 MG: 10 TABLET ORAL at 16:58

## 2020-01-01 RX ADMIN — CALCIUM CHLORIDE, MAGNESIUM CHLORIDE, DEXTROSE MONOHYDRATE, LACTIC ACID, SODIUM CHLORIDE, SODIUM BICARBONATE AND POTASSIUM CHLORIDE 1000 ML/HR: 5.15; 2.03; 22; 5.4; 6.46; 3.09; .157 INJECTION INTRAVENOUS at 01:08

## 2020-01-01 RX ADMIN — INSULIN GLARGINE 30 UNITS: 100 INJECTION, SOLUTION SUBCUTANEOUS at 09:00

## 2020-01-01 RX ADMIN — CALCIUM CHLORIDE, MAGNESIUM CHLORIDE, DEXTROSE MONOHYDRATE, LACTIC ACID, SODIUM CHLORIDE, SODIUM BICARBONATE AND POTASSIUM CHLORIDE 500 ML/HR: 5.15; 2.03; 22; 5.4; 6.46; 3.09; .157 INJECTION INTRAVENOUS at 02:35

## 2020-01-01 RX ADMIN — Medication 50 MCG/MIN: at 04:53

## 2020-01-01 RX ADMIN — INSULIN GLARGINE 30 UNITS: 100 INJECTION, SOLUTION SUBCUTANEOUS at 10:17

## 2020-01-01 RX ADMIN — INSULIN LISPRO 6 UNITS: 100 INJECTION, SOLUTION INTRAVENOUS; SUBCUTANEOUS at 13:15

## 2020-01-01 RX ADMIN — Medication 2500 ML/HR: at 19:08

## 2020-01-01 RX ADMIN — Medication 2 PUFF: at 09:23

## 2020-01-01 RX ADMIN — ASPIRIN 81 MG: 81 TABLET, CHEWABLE ORAL at 15:07

## 2020-01-01 RX ADMIN — INSULIN LISPRO 9 UNITS: 100 INJECTION, SOLUTION INTRAVENOUS; SUBCUTANEOUS at 16:59

## 2020-01-01 RX ADMIN — Medication 50 MCG/MIN: at 04:39

## 2020-01-01 RX ADMIN — INSULIN LISPRO 8 UNITS: 100 INJECTION, SOLUTION INTRAVENOUS; SUBCUTANEOUS at 01:43

## 2020-01-01 RX ADMIN — CALCIUM GLUCONATE 2 G: 98 INJECTION, SOLUTION INTRAVENOUS at 09:35

## 2020-01-01 RX ADMIN — PROPOFOL 40 MCG/KG/MIN: 10 INJECTION, EMULSION INTRAVENOUS at 13:45

## 2020-01-01 RX ADMIN — MIDAZOLAM HYDROCHLORIDE 2 MG: 1 INJECTION, SOLUTION INTRAMUSCULAR; INTRAVENOUS at 20:00

## 2020-01-01 RX ADMIN — ATORVASTATIN CALCIUM 40 MG: 40 TABLET, FILM COATED ORAL at 21:34

## 2020-01-01 RX ADMIN — VASOPRESSIN 0.04 UNITS/MIN: 20 INJECTION INTRAVENOUS at 22:19

## 2020-01-01 RX ADMIN — MIDODRINE HYDROCHLORIDE 5 MG: 5 TABLET ORAL at 15:06

## 2020-01-01 RX ADMIN — PANTOPRAZOLE SODIUM 40 MG: 40 INJECTION, POWDER, FOR SOLUTION INTRAVENOUS at 05:31

## 2020-01-01 RX ADMIN — Medication 15 ML: at 22:37

## 2020-01-01 RX ADMIN — PIPERACILLIN AND TAZOBACTAM 3.38 G: 3; .375 INJECTION, POWDER, LYOPHILIZED, FOR SOLUTION INTRAVENOUS at 05:39

## 2020-01-01 RX ADMIN — SODIUM CHLORIDE, PRESERVATIVE FREE 10 ML: 5 INJECTION INTRAVENOUS at 22:51

## 2020-01-01 RX ADMIN — VASOPRESSIN 0.03 UNITS/MIN: 20 INJECTION INTRAVENOUS at 12:00

## 2020-01-01 RX ADMIN — ASPIRIN 81 MG: 81 TABLET, CHEWABLE ORAL at 09:35

## 2020-01-01 RX ADMIN — Medication 15 MCG/MIN: at 11:06

## 2020-01-01 RX ADMIN — FUROSEMIDE 40 MG: 10 INJECTION, SOLUTION INTRAMUSCULAR; INTRAVENOUS at 10:16

## 2020-01-01 RX ADMIN — PROPOFOL 35 MCG/KG/MIN: 10 INJECTION, EMULSION INTRAVENOUS at 22:37

## 2020-01-01 RX ADMIN — CALCIUM CHLORIDE 1 G: 100 INJECTION, SOLUTION INTRAVENOUS at 09:58

## 2020-01-01 RX ADMIN — Medication 50 MCG/MIN: at 10:30

## 2020-01-01 RX ADMIN — Medication 2500 ML/HR: at 20:51

## 2020-01-01 RX ADMIN — INSULIN GLARGINE 40 UNITS: 100 INJECTION, SOLUTION SUBCUTANEOUS at 22:56

## 2020-01-01 RX ADMIN — SODIUM CHLORIDE, PRESERVATIVE FREE 10 ML: 5 INJECTION INTRAVENOUS at 21:20

## 2020-01-01 RX ADMIN — VASOPRESSIN 0.04 UNITS/MIN: 20 INJECTION INTRAVENOUS at 02:44

## 2020-01-01 RX ADMIN — Medication 2500 ML/HR: at 07:39

## 2020-01-01 RX ADMIN — Medication 50 MEQ: at 10:01

## 2020-01-01 RX ADMIN — DEXTROSE MONOHYDRATE 25 G: 25 INJECTION, SOLUTION INTRAVENOUS at 09:43

## 2020-01-01 RX ADMIN — AMIODARONE HYDROCHLORIDE 150 MG: 50 INJECTION, SOLUTION INTRAVENOUS at 09:49

## 2020-01-01 RX ADMIN — LEVETIRACETAM 500 MG: 100 INJECTION, SOLUTION INTRAVENOUS at 17:25

## 2020-01-01 RX ADMIN — INSULIN GLARGINE 30 UNITS: 100 INJECTION, SOLUTION SUBCUTANEOUS at 21:20

## 2020-01-01 RX ADMIN — Medication 45 MCG/MIN: at 00:10

## 2020-01-01 RX ADMIN — PROPOFOL 35 MCG/KG/MIN: 10 INJECTION, EMULSION INTRAVENOUS at 01:45

## 2020-01-01 RX ADMIN — INSULIN GLARGINE 30 UNITS: 100 INJECTION, SOLUTION SUBCUTANEOUS at 21:31

## 2020-01-01 RX ADMIN — ASPIRIN 81 MG: 81 TABLET, CHEWABLE ORAL at 08:52

## 2020-01-01 RX ADMIN — Medication 2500 ML/HR: at 23:25

## 2020-01-01 RX ADMIN — ASPIRIN 81 MG: 81 TABLET, CHEWABLE ORAL at 13:13

## 2020-01-01 RX ADMIN — PROPOFOL 25 MCG/KG/MIN: 10 INJECTION, EMULSION INTRAVENOUS at 21:34

## 2020-01-01 RX ADMIN — Medication 1 UNITS: at 10:00

## 2020-01-01 RX ADMIN — FLUDROCORTISONE ACETATE 0.1 MG: 0.1 TABLET ORAL at 06:23

## 2020-01-01 RX ADMIN — Medication 2 PUFF: at 21:23

## 2020-01-01 RX ADMIN — TIMOLOL MALEATE 1 DROP: 5 SOLUTION/ DROPS OPHTHALMIC at 22:56

## 2020-01-01 RX ADMIN — PROPOFOL 10 MCG/KG/MIN: 10 INJECTION, EMULSION INTRAVENOUS at 08:52

## 2020-01-01 RX ADMIN — CALCIUM GLUCONATE 2 G: 98 INJECTION, SOLUTION INTRAVENOUS at 16:42

## 2020-01-01 RX ADMIN — Medication 1 TABLET: at 13:12

## 2020-01-01 RX ADMIN — VASOPRESSIN 0.04 UNITS/MIN: 20 INJECTION INTRAVENOUS at 08:15

## 2020-01-01 RX ADMIN — Medication 2500 ML/HR: at 09:16

## 2020-01-01 RX ADMIN — FLUDROCORTISONE ACETATE 0.1 MG: 0.1 TABLET ORAL at 07:29

## 2020-01-01 RX ADMIN — Medication 50 MCG/MIN: at 22:55

## 2020-01-01 RX ADMIN — PHENYLEPHRINE HYDROCHLORIDE 250 MCG/MIN: 10 INJECTION INTRAVENOUS at 20:26

## 2020-01-01 RX ADMIN — Medication 1 MG: at 09:43

## 2020-01-01 RX ADMIN — Medication 15 ML: at 21:34

## 2020-01-01 RX ADMIN — Medication 1 MG: at 09:49

## 2020-01-01 RX ADMIN — Medication 2500 ML/HR: at 16:42

## 2020-01-01 RX ADMIN — SODIUM POLYSTYRENE SULFONATE 15 G: 15 SUSPENSION ORAL; RECTAL at 07:12

## 2020-01-01 RX ADMIN — MIDODRINE HYDROCHLORIDE 5 MG: 5 TABLET ORAL at 13:14

## 2020-01-01 RX ADMIN — Medication 2500 ML/HR: at 05:19

## 2020-01-01 RX ADMIN — CALCIUM CHLORIDE, MAGNESIUM CHLORIDE, DEXTROSE MONOHYDRATE, LACTIC ACID, SODIUM CHLORIDE, SODIUM BICARBONATE AND POTASSIUM CHLORIDE 1000 ML/HR: 5.15; 2.03; 22; 5.4; 6.46; 3.09; .157 INJECTION INTRAVENOUS at 08:07

## 2020-01-01 RX ADMIN — Medication 2500 ML/HR: at 00:43

## 2020-01-01 RX ADMIN — PANTOPRAZOLE SODIUM 40 MG: 40 INJECTION, POWDER, FOR SOLUTION INTRAVENOUS at 21:41

## 2020-01-01 RX ADMIN — VASOPRESSIN 0.04 UNITS/MIN: 20 INJECTION INTRAVENOUS at 17:07

## 2020-01-01 RX ADMIN — Medication 25 MCG/MIN: at 15:34

## 2020-01-01 RX ADMIN — CALCIUM CHLORIDE 1 G: 100 INJECTION, SOLUTION INTRAVENOUS at 09:42

## 2020-01-01 RX ADMIN — Medication 2500 ML/HR: at 02:52

## 2020-01-01 RX ADMIN — SODIUM CHLORIDE, PRESERVATIVE FREE 10 ML: 5 INJECTION INTRAVENOUS at 08:33

## 2020-01-01 RX ADMIN — Medication 1 MG: at 09:52

## 2020-01-01 RX ADMIN — PANTOPRAZOLE SODIUM 40 MG: 40 INJECTION, POWDER, FOR SOLUTION INTRAVENOUS at 07:11

## 2020-01-01 RX ADMIN — Medication 50 MCG/MIN: at 17:06

## 2020-01-01 RX ADMIN — MIDAZOLAM HYDROCHLORIDE 5 MG/HR: 5 INJECTION, SOLUTION INTRAMUSCULAR; INTRAVENOUS at 10:31

## 2020-01-01 RX ADMIN — CALCIUM GLUCONATE 2 G: 98 INJECTION, SOLUTION INTRAVENOUS at 04:37

## 2020-01-01 RX ADMIN — Medication 2500 ML/HR: at 14:35

## 2020-01-01 RX ADMIN — IPRATROPIUM BROMIDE AND ALBUTEROL SULFATE 1 AMPULE: .5; 3 SOLUTION RESPIRATORY (INHALATION) at 17:11

## 2020-01-01 RX ADMIN — PROPOFOL 35 MCG/KG/MIN: 10 INJECTION, EMULSION INTRAVENOUS at 04:53

## 2020-01-01 RX ADMIN — INSULIN LISPRO 6 UNITS: 100 INJECTION, SOLUTION INTRAVENOUS; SUBCUTANEOUS at 22:38

## 2020-01-01 RX ADMIN — Medication: at 10:00

## 2020-01-01 RX ADMIN — MIDODRINE HYDROCHLORIDE 10 MG: 10 TABLET ORAL at 13:14

## 2020-01-01 RX ADMIN — ATORVASTATIN CALCIUM 40 MG: 40 TABLET, FILM COATED ORAL at 22:11

## 2020-01-01 RX ADMIN — WARFARIN SODIUM 6 MG: 3 TABLET ORAL at 17:38

## 2020-01-01 RX ADMIN — IPRATROPIUM BROMIDE AND ALBUTEROL SULFATE 1 AMPULE: .5; 3 SOLUTION RESPIRATORY (INHALATION) at 13:34

## 2020-01-01 RX ADMIN — MIDAZOLAM HYDROCHLORIDE 5 MG/HR: 5 INJECTION, SOLUTION INTRAMUSCULAR; INTRAVENOUS at 20:09

## 2020-01-01 RX ADMIN — Medication 2500 ML/HR: at 04:55

## 2020-01-01 RX ADMIN — FUROSEMIDE 40 MG: 10 INJECTION, SOLUTION INTRAMUSCULAR; INTRAVENOUS at 10:28

## 2020-01-01 RX ADMIN — IPRATROPIUM BROMIDE AND ALBUTEROL SULFATE 1 AMPULE: .5; 3 SOLUTION RESPIRATORY (INHALATION) at 21:16

## 2020-01-01 RX ADMIN — TIMOLOL MALEATE 1 DROP: 5 SOLUTION/ DROPS OPHTHALMIC at 13:10

## 2020-01-01 RX ADMIN — Medication 2500 ML/HR: at 18:45

## 2020-01-01 RX ADMIN — MIDODRINE HYDROCHLORIDE 10 MG: 10 TABLET ORAL at 15:06

## 2020-01-01 RX ADMIN — LEVETIRACETAM 500 MG: 100 INJECTION, SOLUTION INTRAVENOUS at 17:15

## 2020-01-01 RX ADMIN — Medication 2 PUFF: at 22:28

## 2020-01-01 RX ADMIN — Medication 1 TABLET: at 15:08

## 2020-01-01 RX ADMIN — MIDAZOLAM 2 MG: 1 INJECTION INTRAMUSCULAR; INTRAVENOUS at 19:06

## 2020-01-01 RX ADMIN — PROPOFOL 35 MCG/KG/MIN: 10 INJECTION, EMULSION INTRAVENOUS at 14:12

## 2020-01-01 RX ADMIN — INSULIN GLARGINE 50 UNITS: 100 INJECTION, SOLUTION SUBCUTANEOUS at 22:44

## 2020-01-01 RX ADMIN — AMIODARONE HYDROCHLORIDE 0.5 MG/MIN: 1.8 INJECTION, SOLUTION INTRAVENOUS at 04:19

## 2020-01-01 RX ADMIN — MIDODRINE HYDROCHLORIDE 10 MG: 10 TABLET ORAL at 17:07

## 2020-01-01 RX ADMIN — VASOPRESSIN 0.03 UNITS/MIN: 20 INJECTION INTRAVENOUS at 01:39

## 2020-01-01 RX ADMIN — Medication 50 MEQ: at 09:47

## 2020-01-01 RX ADMIN — CALCIUM CHLORIDE, MAGNESIUM CHLORIDE, DEXTROSE MONOHYDRATE, LACTIC ACID, SODIUM CHLORIDE, SODIUM BICARBONATE AND POTASSIUM CHLORIDE 1000 ML/HR: 5.15; 2.03; 22; 5.4; 6.46; 3.09; .157 INJECTION INTRAVENOUS at 14:02

## 2020-01-01 RX ADMIN — HEPARIN SODIUM: 1000 INJECTION INTRAVENOUS; SUBCUTANEOUS at 14:37

## 2020-01-01 RX ADMIN — CALCIUM CHLORIDE, MAGNESIUM CHLORIDE, DEXTROSE MONOHYDRATE, LACTIC ACID, SODIUM CHLORIDE, SODIUM BICARBONATE AND POTASSIUM CHLORIDE 1000 ML/HR: 5.15; 2.03; 22; 5.4; 6.46; 3.09; .157 INJECTION INTRAVENOUS at 05:20

## 2020-01-01 RX ADMIN — CALCIUM CHLORIDE, MAGNESIUM CHLORIDE, DEXTROSE MONOHYDRATE, LACTIC ACID, SODIUM CHLORIDE, SODIUM BICARBONATE AND POTASSIUM CHLORIDE 500 ML/HR: 5.15; 2.03; 22; 5.4; 6.46; 3.09; .157 INJECTION INTRAVENOUS at 13:57

## 2020-01-01 RX ADMIN — CALCIUM CHLORIDE, MAGNESIUM CHLORIDE, DEXTROSE MONOHYDRATE, LACTIC ACID, SODIUM CHLORIDE, SODIUM BICARBONATE AND POTASSIUM CHLORIDE 500 ML/HR: 5.15; 2.03; 22; 5.4; 6.46; 3.09; .157 INJECTION INTRAVENOUS at 14:32

## 2020-01-01 RX ADMIN — PHENYLEPHRINE HYDROCHLORIDE 250 MCG/MIN: 10 INJECTION INTRAVENOUS at 02:46

## 2020-01-01 ASSESSMENT — ENCOUNTER SYMPTOMS
ABDOMINAL PAIN: 0
ABDOMINAL PAIN: 0
RHINORRHEA: 0
ABDOMINAL PAIN: 0
SORE THROAT: 0
TROUBLE SWALLOWING: 0
COUGH: 0
SINUS PAIN: 0
WHEEZING: 0
VOMITING: 0
DIARRHEA: 0
TROUBLE SWALLOWING: 0
ABDOMINAL PAIN: 0
VISUAL CHANGE: 0
WHEEZING: 0
NAUSEA: 0
VOMITING: 0
DIARRHEA: 0
SHORTNESS OF BREATH: 0
COUGH: 0
NAUSEA: 0
WHEEZING: 0
CHEST TIGHTNESS: 0
SINUS PRESSURE: 0
COUGH: 0
VOICE CHANGE: 0
VISUAL CHANGE: 0
COUGH: 0
VOICE CHANGE: 0
NAUSEA: 0
BACK PAIN: 0
VOMITING: 0
WHEEZING: 0
SINUS PRESSURE: 0
VOICE CHANGE: 0
SHORTNESS OF BREATH: 1
CONSTIPATION: 0
CONSTIPATION: 0
DIARRHEA: 0
SORE THROAT: 0
ABDOMINAL PAIN: 0
STRIDOR: 0
CONSTIPATION: 0
CONSTIPATION: 0
SORE THROAT: 0
WHEEZING: 0
BACK PAIN: 0
VOMITING: 0
SORE THROAT: 0
VOMITING: 0
COUGH: 0
NAUSEA: 0
TROUBLE SWALLOWING: 0
EYES NEGATIVE: 1
VOICE CHANGE: 0
SINUS PRESSURE: 0
DIARRHEA: 0
COUGH: 0
BACK PAIN: 0
CHEST TIGHTNESS: 0
SHORTNESS OF BREATH: 0
SINUS PRESSURE: 0
CHEST TIGHTNESS: 0
RHINORRHEA: 0
TROUBLE SWALLOWING: 0
SHORTNESS OF BREATH: 0
VISUAL CHANGE: 0
SHORTNESS OF BREATH: 1
DIARRHEA: 0
CHEST TIGHTNESS: 0
RHINORRHEA: 0
VOICE CHANGE: 0
SINUS PRESSURE: 0
SORE THROAT: 0
SHORTNESS OF BREATH: 1
WHEEZING: 0
ABDOMINAL PAIN: 0
SORE THROAT: 0
NAUSEA: 0
SHORTNESS OF BREATH: 0
ABDOMINAL PAIN: 0
DIARRHEA: 0
ABDOMINAL PAIN: 0
TROUBLE SWALLOWING: 0
NAUSEA: 0
CONSTIPATION: 0
CHEST TIGHTNESS: 0
CHEST TIGHTNESS: 0
CONSTIPATION: 0
SORE THROAT: 0
NAUSEA: 0
DIARRHEA: 0
WHEEZING: 0
DIARRHEA: 0
COUGH: 1
BACK PAIN: 0
VOMITING: 0
SHORTNESS OF BREATH: 0
CHEST TIGHTNESS: 0
SINUS PRESSURE: 0
TROUBLE SWALLOWING: 0
COUGH: 0
RHINORRHEA: 0
BACK PAIN: 0
RHINORRHEA: 0
VOMITING: 0

## 2020-01-01 ASSESSMENT — PULMONARY FUNCTION TESTS
PIF_VALUE: 31
PIF_VALUE: 33
PIF_VALUE: 34
PIF_VALUE: 32
PIF_VALUE: 38
PIF_VALUE: 33
PIF_VALUE: 32
PIF_VALUE: 34
PIF_VALUE: 33
PIF_VALUE: 32
PIF_VALUE: 31
PIF_VALUE: 34
PIF_VALUE: 31
PIF_VALUE: 31
PIF_VALUE: 33
PIF_VALUE: 31
PIF_VALUE: 34
PIF_VALUE: 34
PIF_VALUE: 32

## 2020-01-01 ASSESSMENT — PAIN SCALES - GENERAL
PAINLEVEL_OUTOF10: 0

## 2020-01-01 ASSESSMENT — PATIENT HEALTH QUESTIONNAIRE - PHQ9
SUM OF ALL RESPONSES TO PHQ QUESTIONS 1-9: 0
1. LITTLE INTEREST OR PLEASURE IN DOING THINGS: 0
SUM OF ALL RESPONSES TO PHQ9 QUESTIONS 1 & 2: 0
SUM OF ALL RESPONSES TO PHQ QUESTIONS 1-9: 0
2. FEELING DOWN, DEPRESSED OR HOPELESS: 0

## 2020-01-01 ASSESSMENT — LIFESTYLE VARIABLES: HOW OFTEN DO YOU HAVE A DRINK CONTAINING ALCOHOL: 0

## 2020-01-08 NOTE — PROGRESS NOTES
[place] [facility]     Patient: Miguel Bell, 80 y.o.   : 1934    Pt of Dr. Myra Kraus   Patient presents with    Follow-up     COPD 2 mo f/u medication check        HPI  Natasha Castellanos is here for 2 month med follow up for his COPD,   Wife is with him today   FENO level 36 last visit, started pt on Low dose Symbicort 80/4.5 mcg BID- prior to this was not using any inhalers. PFT 10/2019- severe obstruction, mod restriction, mod diffusion defect. Former smoker  Feels his SOB is about the same, occurs with exertional activity, house chores  Occasional cough- sometimes productive clear mucous. -  Not bothersome, not affecting sleep     Past Medical hx   PMH:  Past Medical History:   Diagnosis Date    Acute on chronic combined systolic and diastolic congestive heart failure ([address]     Aortic stenosis     Atrial fibrillation ([address] 2017    Atrial flutter ([address] 2017    COPD (chronic obstructive pulmonary disease) ([address]     Coronary artery disease     DM (diabetes mellitus), type 2 with renal complications ([address]     DVT (deep venous thrombosis) ([address]     Hemodialysis patient ([address] 10/22/2016    [facility]    Hyperlipidemia     Hypertension     Morbid obesity with BMI of 50.0-59.9, adult ([address]     Obstructive sleep apnea     On home oxygen therapy     KELSEY treated with BiPAP     Osteoarthritis     Pacemaker     [place] dual pacer    Prostate enlargement      SURGICAL HISTORY:  Past Surgical History:   Procedure Laterality Date    AORTIC VALVE REPLACEMENT  2/3/2010    tissue valve    CARDIAC CATHETERIZATION  2010    Severe AS. Mild MV stenosis. Normal coronary arteries. Normal LV systolic function. EF 70%. Moderate concentric LVH. SPAP 47/20.  CARDIAC CATHETERIZATION  8 12     Transvenous dual chamber permanent pacemaer implantation.     [address]  11 16  Normal coronary arterties. EF 65%. LVH. Mild to moderate AS w/ AV area of 1.47 squared. SPAP 45/19    CARDIOVASCULAR STRESS TEST  7 15 2009    Sinus rhythm w/ average heart rate of 60 bpm. Borderline to mild MN interval prolongation throughout majority of recording. Increased QRS duration compatible w/ right BBB. Intermittent episodes of Mobitz type 2 secondary AV block, manifested as non-conducted sinus impulses which were not premature. Intermittent episodes of high-grade AV block terminated by an idioventricular escape rhythm, 30bpm.    CARDIOVASCULAR STRESS TEST  11 09 2007    Normal stress test. HTN. DM type 2.     COLONOSCOPY      DIALYSIS FISTULA CREATION  07/27/11    Rt Wrist    EYE SURGERY      OTHER SURGICAL HISTORY  09/13/2016    FIBEROPTIC BRONCHOSCOPY    PACEMAKER PLACEMENT      MN COLONOSCOPY FLX DX W/COLLJ SPEC WHEN PFRMD N/A 9/7/2018    COLONOSCOPY performed by Jose Sweet MD at [facility]    MN EGD TRANSORAL BIOPSY SINGLE/MULTIPLE N/A 9/7/2018    EGD performed by Jose Sweet MD at [address] [place], [place] [address]vd  6yrs old   [address] ECHOCARDIOGRAM  12 09 2009    Severely dilated LV w/ moderate LVH. EF 55-56.4%. Mildly dilated RV w/ normal systolic function. Grade 2 diastolic dysfunction w/ severely elevated LA pressure. Severe calcific AS. No AI. Mild MV stenosis w/ mild to moderate MR. Mild TR w/ mildly elevated RVSP, 40-45mmHg. Moderately dilated La dn RA.  TRANSTHORACIC ECHOCARDIOGRAM  11 09 2007    Technically limited d/t body habitus. Preserved systolic function of LV w/ diffuse LVH. EF 50-55%. Moderate to moderately severe AS. Trace AI. Slight enlargement of LA, trace MR. RA and RV normal contractility. Trace TR. Pulmonary vein and pulmonary valve were not visualized very well, neither aortic arch.      UPPER GASTROINTESTINAL ENDOSCOPY       SOCIAL HISTORY:  Social History     Tobacco Use    Smoking status: Former Smoker     Years: 20.00     Types: Pipe, Cigars     Last attempt to quit: 1993     Years since quittin.8    Smokeless tobacco: Former User     Types: Chew   Substance Use Topics    Alcohol use: No    Drug use: No     ALLERGIES:No Known Allergies  FAMILY HISTORY:  Family History   Problem Relation Age of Onset    Diabetes Father     Diabetes Sister     Diabetes Brother      CURRENT MEDICATIONS:  Current Outpatient Medications   Medication Sig Dispense Refill    Spacer/Aero-Holding Chambers ZOHREH 1 Device by Does not apply route 2 times daily With Symbicort inhaler 1 Device 0    SALINE NASAL SPRAY NA by Nasal route as needed Indications: Dry Nose      budesonide-formoterol (SYMBICORT) 80-4.5 MCG/ACT AERO Inhale 2 puffs into the lungs 2 times daily Rinse mouth after its use. 1 Inhaler 11    isosorbide mononitrate (IMDUR) 30 MG extended release tablet Take 1 tablet by mouth daily Indications: Treatment to Prevent Angina 90 tablet 1    fludrocortisone (FLORINEF) 0.1 MG tablet Take 1 tablet by mouth daily Indications: Blood Pressure Drop Upon Standing 90 tablet 1    tamsulosin (FLOMAX) 0.4 MG capsule Take 1 capsule by mouth daily 90 capsule 1    atorvastatin (LIPITOR) 40 MG tablet Take 1 tablet by mouth daily 90 tablet 1    warfarin (COUMADIN) 3 MG tablet Take as directed by [facility]. 200 tablets for 90 days (Patient taking differently: 6 mg Take as directed by [address] 200 tablets for 90 days) 200 tablet 3    fluticasone (FLONASE) 50 MCG/ACT nasal spray SPRAY 2 SPRAYS INTO EACH NOSTRIL EVERY DAY 48 g 0    zoster recombinant adjuvanted vaccine (SHINGRIX) 50 MCG/0.5ML SUSR injection 1 dose now and repeat in 2-6 months 0.5 mL 0    midodrine (PROAMATINE) 5 MG tablet TAKE 1 TABLET BY MOUTH 3 TIMES A DAY.   4    midodrine (PROAMATINE) 10 MG tablet Take 1 tablet by mouth 3 times daily (with meals) 270 tablet 1    insulin aspart (NOVOLOG FLEXPEN) 100 UNIT/ML

## 2020-01-28 NOTE — PROGRESS NOTES
Visit Date: 1/28/2020     Selvin Oconnor is a 80 y.o. male who presents today for:  Chief Complaint   Patient presents with   [facility] Check-Up    Diabetes         HPI:     Patient is here for follow-up, he does not have a specific complaint, doing well otherwise    Patient just came from dialysis, he does dialysis Tuesday Thursday and Saturday    Patient is otherwise feeling well no shortness of breath    Diabetes   He presents for his follow-up diabetic visit. He has type 1 diabetes mellitus. His disease course has been stable. Pertinent negatives for hypoglycemia include no dizziness or headaches. Pertinent negatives for diabetes include no chest pain, no fatigue, no foot paresthesias, no visual change, no weakness and no weight loss. Symptoms are stable. Hypertension   Pertinent negatives include no chest pain, headaches, neck pain, palpitations or shortness of breath. Hyperlipidemia   Pertinent negatives include no chest pain, myalgias or shortness of breath. Current Medications:  Current Outpatient Medications   Medication Sig Dispense Refill    insulin glargine (LANTUS SOLOSTAR) 100 UNIT/ML injection pen Inject 40 -50 units at bedtime 45 pen 1    insulin aspart (NOVOLOG FLEXPEN) 100 UNIT/ML injection pen 2-16 units 3 times a day as per scale 45 pen 3    Spacer/Aero-Holding Chambers ZOHREH 1 Device by Does not apply route 2 times daily With Symbicort inhaler 1 Device 0    SALINE NASAL SPRAY NA by Nasal route as needed Indications: Dry Nose      budesonide-formoterol (SYMBICORT) 80-4.5 MCG/ACT AERO Inhale 2 puffs into the lungs 2 times daily Rinse mouth after its use.  1 Inhaler 11    isosorbide mononitrate (IMDUR) 30 MG extended release tablet Take 1 tablet by mouth daily Indications: Treatment to Prevent Angina 90 tablet 1    fludrocortisone (FLORINEF) 0.1 MG tablet Take 1 tablet by mouth daily Indications: Blood Pressure Drop Upon Standing 90 tablet 1    tamsulosin (FLOMAX) 0.4 MG capsule Take 1 capsule by mouth daily 90 capsule 1    atorvastatin (LIPITOR) 40 MG tablet Take 1 tablet by mouth daily 90 tablet 1    warfarin (COUMADIN) 3 MG tablet Take as directed by [facility]. 200 tablets for 90 days (Patient taking differently: 6 mg Take as directed by [address] 200 tablets for 90 days) 200 tablet 3    fluticasone (FLONASE) 50 MCG/ACT nasal spray SPRAY 2 SPRAYS INTO EACH NOSTRIL EVERY DAY 48 g 0    midodrine (PROAMATINE) 5 MG tablet 3 times daily Take with midodrine 10 mg 3 times a day to total 15 mg per dose. 4    midodrine (PROAMATINE) 10 MG tablet Take 1 tablet by mouth 3 times daily (with meals) 270 tablet 1    ammonium lactate (AMLACTIN) 12 % cream APPLY TO AFFECTED AREA 2 TIMES DAILY  11    B Complex-C-Folic Acid (ALBAN CAPS) 1 MG CAPS TAKE ONE CAPSULE BY MOUTH EVERY DAY  11    Insulin Pen Needle (B-D UF III MINI PEN NEEDLES) 31G X 5 MM MISC 1 each by Does not apply route 4 times daily 300 each 1    ONETOUCH VERIO strip TEST BLOOD SUGAR 3 TIMES A DAY E11.9 300 strip 3    acetaminophen (TYLENOL 8 HOUR) 650 MG extended release tablet Take 650 mg by mouth every 4 hours Don't take more than 3,000 mg each day.  metoprolol tartrate (LOPRESSOR) 25 MG tablet Take 0.5 tablets by mouth Daily 60 tablet 5    Ferric Citrate (AURYXIA) 1  MG(Fe) TABS Take 1 tablet by mouth 3 times daily (Patient taking differently: Take 2 tablets by mouth 3 times daily ) 270 tablet 1    aspirin 81 MG chewable tablet Take 1 tablet by mouth daily 100 tablet 1    timolol (TIMOPTIC) 0.5 % ophthalmic solution Place 1 drop into both eyes 2 times daily Indications: Disease of the Eye 15 mL 1    OXYGEN by Nasal route See Admin Instructions Indications: Oxygen Therapy      warfarin (COUMADIN) 2.5 MG tablet Indications: Anticoagulant Therapy, Atrial Fibrillation, Blockage of Blood Vessel to Lung by a Particle Take as directed by [facility].         No current is normal.      Breath sounds: Normal breath sounds. No wheezing or rales. Chest:      Chest wall: No tenderness. Abdominal:      General: Bowel sounds are normal.      Palpations: Abdomen is soft. There is no mass. Tenderness: There is no abdominal tenderness. Lymphadenopathy:      Cervical: No cervical adenopathy. Skin:     Findings: No rash. Neurological:      Mental Status: He is alert and oriented to person, place, and time. Psychiatric:         Behavior: Behavior is cooperative. Assessment:       Diagnosis Orders   1. Type 2 diabetes mellitus with stage 4 chronic kidney disease, with long-term current use of insulin ([facility])  POCT Glucose    POCT glycosylated hemoglobin (Hb A1C)   2. Essential hypertension     3. Chronic obstructive pulmonary disease, unspecified COPD type ([facility]         Plan:      Medications Prescribed:  Orders Placed This Encounter   Medications    insulin glargine (LANTUS SOLOSTAR) 100 UNIT/ML injection pen     Sig: Inject 40 -50 units at bedtime     Dispense:  45 pen     Refill:  1    insulin aspart (NOVOLOG FLEXPEN) 100 UNIT/ML injection pen     Si-16 units 3 times a day as per scale     Dispense:  45 pen     Refill:  3       Orders Placed:  Orders Placed This Encounter   Procedures    POCT Glucose    POCT glycosylated hemoglobin (Hb A1C)     Lab Results   Component Value Date    LABA1C 7.6 (A) 2020    LABA1C 7.8 10/22/2019    LABA1C 7.7 2019     Lab Results   Component Value Date    LDLCALC 151 2018    CREATININE 7.8 ([facility]) 2018     Continue to monitor blood sugars 3 times a day. Return in about 3 months (around 2020). Discussed use, benefit, and side effectsof prescribed medications. All patient questions answered. Pt voiced understanding. Instructed to continue current medications, diet and exercise. Patient agreedwith treatment plan.

## 2020-02-04 PROBLEM — I95.9 LOW BLOOD PRESSURE: Status: ACTIVE | Noted: 2020-01-01

## 2020-02-04 NOTE — H&P
Dr. Brenden Aldridge ( Internal Medicine Specialties )  H&P  2/4/2020  3:38 PM    Patient:  Eliu Acosta  YOB: 1934    MRN: [id number]   Acct:  [de-identified]   23/023A  Primary Care Physician: Johanny Cohen MD    dictated    Electronically signed by Vianey Erazo MD on 2/4/2020 at 3:38 PM         Copy: Primary Care Physician: Johanny Cohen MD

## 2020-02-04 NOTE — ED NOTES
Pt resting in bed. VSS. Updated on POC. No distress noted. WIll continue to monitor.       Yovanny Ramirez RN  02/04/20 [id number]

## 2020-02-04 NOTE — ED NOTES
Pt resting in bed. VSS. Updated on POC. No distress noted. Will continue to monitor.       Jesus Nicole RN  02/04/20 [id number]

## 2020-02-04 NOTE — ED NOTES
ED nurse-to-nurse bedside report    Chief Complaint   Patient presents with    Shortness of Breath      LOC: alert and orientated to name, place, date  Vital signs   Vitals:    02/04/20 1032 02/04/20 1045 02/04/20 1159   BP: (!) 110/46 (!) 95/47 (!) 107/48   Pulse: 70 70 72   Resp: 18 16 16   Temp: 98.9 °F (37.2 °C)     TempSrc: Oral     SpO2: 97% 98% 98%   Weight: (!) 348 lb (157.9 kg)     Height: 5' 10\" (1.778 m)        Pain:    Pain Interventions: none  Pain Goal: none  Oxygen: Yes    Current needs required none   Telemetry: Yes  LDAs:   Peripheral IV 02/04/20 Left Forearm (Active)   Site Assessment Dry; Intact; Clean 2/4/2020 11:59 AM   Line Status Infusing 2/4/2020 11:59 AM   Dressing Status Clean;Dry; Intact 2/4/2020 11:59 AM     Continuous Infusions:   Mobility: Requires assistance * 1  Stern Fall Risk Score:    Fall Risk 10/22/2019 1/30/2019 7/10/2018 1/4/2018 3/28/2017 1/14/2016 3/13/2015   2 or more falls in past year? no no no no no no no   Fall with injury in past year? no no no no no no no     Fall Interventions: fall band  Report given to: Palmer Beltran RN  02/04/20 6007

## 2020-02-04 NOTE — CONSULTS
Nephrology Consult Note  Patient's Name: Guy Faith  6:44 PM  2/4/2020    Nephrologist: Nuha Choudhary    Reason for Consult: End stage kidney disease. Volume overload. Hemodialysis management. Requesting Physician:  Hector Salmon MD  PCP: Brandi Hatfield MD    Chief Complaint: Shortness of breath  Assessment  1. End stage kidney disease on hemodialysis. 2. Pulmonary edema. 3. Bilateral pleural effusion right worse than the left. 4. Metabolic acidosis. 5. Elevated troponin level of uncertain significant. 6. Diabetes mellitus type 2.  7. COPD. 8. Anemia of chronic disease. Plan    1. I discussed my thoughts with the patient at length. 2. He understood. 3. Labs reviewed. 4. Medications reviewed. 5. Chest x-ray report and images reviewed. 6. Hemodialysis is in progress. 7. Seen in hemodialysis unit. 8. He is tolerating treatment well so far. 9. He is hemodynamically stable. 10. Goal ultrafiltration is 4 kg. 11. He is  8 kg above his dry weight. 12. He received  10 mg midodrine before initiation of dialysis. 13. Repeat dialysis tomorrow hopefully to bring him down to his baseline dry weight. 14. Discussed with dialysis staff. 15. Discussed with the patient. 16. Will follow. History Obtained From: Patient, staff and medical record  History of Present Ilness:    Guy Faith is a 80 y.o. 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 Patient was last dialyzed  3 days ago. He was supposed to go there for hemodialysis. However, he became very short of breath early this morning. According to him he woke up and just had difficulties breathing. As a result he came to the emergency department. He was found to be in pulmonary edema. Admitted for evaluation and management.   I was asked to see him for hemodialysis management and volume overload. He was seen in the hemodialysis unit as dialysis was very promptly arrange once I received the call. In fact he was transported to dialysis unit directly from the emergency department. Shortness of breath is better now according to him. No chest pain. He had a chest x-ray done in the emergency department which revealed a pulmonary vascular congestion with bilateral pleural effusion worse on the right compared to the left. Past Medical History:   Diagnosis Date    Acute on chronic combined systolic and diastolic congestive heart failure (HCC)     Aortic stenosis     Atrial fibrillation ([facility]) 1/25/2017    Atrial flutter ([facility] 1/25/2017    COPD (chronic obstructive pulmonary disease) ([facility])     Coronary artery disease     DM (diabetes mellitus), type 2 with [facility] complications [facility]     DVT (deep venous thrombosis) [facility]     Hemod[facility] patient ([facility] 10/22/2016    with [facility]    Hyperlipidemia     Hypertension     Morbid obesity with BMI of 50.0-59.9, adult ([facility]     Obstructive sleep apnea     On home oxygen therapy 2013    KELSEY treated with BiPAP     Osteoarthritis     Pacemaker     [place] dual pacer    Prostate enlargement        Past Surgical History:   Procedure Laterality Date    AORTIC VALVE REPLACEMENT  2/3/2010    tissue valve    CARDIAC CATHETERIZATION  1 20 2010    Severe AS. Mild MV stenosis. Normal coronary arteries. Normal LV systolic function. EF 70%. Moderate concentric LVH. SPAP 47/20.  CARDIAC CATHETERIZATION  8 12 2009    Transvenous dual chamber permanent pacemaer implantation.  CARDIAC CATHETERIZATION  11 16 2007    Normal coronary arterties. EF 65%. LVH. Mild to moderate AS w/ AV area of 1.47 squared. SPAP 45/19    CARDIOVASCULAR STRESS TEST  7 15 2009    Sinus rhythm w/ average heart rate of 60 bpm. Borderline to mild IN interval prolongation throughout majority of recording.

## 2020-02-04 NOTE — ED NOTES
Pt. Resting comfortably in bed w/ family at bedside. Pt. Has no complaints at this time. Patient appears to be feeling better. Pt.'s V/S stable.       Mars Cuevas RN  02/04/20 2223

## 2020-02-04 NOTE — ED NOTES
ED to inpatient nurses report    Chief Complaint   Patient presents with    Shortness of Breath      Present to ED from home  LOC: alert and orientated to name, place, date  Vital signs   Vitals:    02/04/20 1159 02/04/20 1351 02/04/20 1508 02/04/20 1625   BP: (!) 107/48 (!) 108/55 (!) 95/57 (!) 154/72   Pulse: 72 70 70 70   Resp: 16 18 20 18   Temp:       TempSrc:       SpO2: 98% 98% 95% 99%   Weight:       Height:          Oxygen Baseline 2LNC    Current needs required 2LNC   LDAs:   Peripheral IV 02/04/20 Left Forearm (Active)   Site Assessment Dry; Intact; Clean 2/4/2020  4:26 PM   Line Status Normal saline locked 2/4/2020  4:26 PM   Dressing Status Clean;Dry; Intact 2/4/2020  4:26 PM     Mobility: Requires assistance * 1  Pending ED orders: N/A  Present condition: Wearing O2. No acute distress.      Electronically signed by Abby Yang RN on 2/4/2020 at 4:54 PM       Abby Yang RN  02/04/20 1268

## 2020-02-05 PROBLEM — D63.1 ANEMIA DUE TO CHRONIC KIDNEY DISEASE: Status: ACTIVE | Noted: 2020-01-01

## 2020-02-05 PROBLEM — N18.9 ANEMIA DUE TO CHRONIC KIDNEY DISEASE: Status: ACTIVE | Noted: 2020-01-01

## 2020-02-05 NOTE — FLOWSHEET NOTE
02/05/20 1523   Provider Notification   Reason for Communication Evaluate   Provider Name Vincent Sheth    Provider Notification Nurse Practitioner   Method of Communication Secure Message   Response En route   Notification Time [phone] with Vincent Sheth NP regarding plans for patient hemodialysis tomorrow. She said patient is ok to be discharged form their standpoint. And he if possible should be discharged today or tomorrow AM before his outpatient hemodialysis at 9:50 AM so he could be transferred over to [facility] in the morning by transport after he is discharged to complete his scheduled outpatient hemo.      This RN called central transport and they confirmed they can take patient to [facility]

## 2020-02-05 NOTE — FLOWSHEET NOTE
4.5 hour treatment completed without complications. tolerated 4L fluid removal well. Midodrine 10mg given pre tx per dr Jacky Andrade order. pressure held to each site x10min. dressing clean dry and intact upon leaving the unit. report called to primary nurse. tx to be scanned into EMR.

## 2020-02-05 NOTE — CARE COORDINATION
2/5/20, 7:31 AM  DISCHARGE PLANNING EVALUATION:    Jaqueline Griffith       Admitted from: ED 2/4/2020/ [facility] day: 0   Location: -26/026-A Reason for admit: Low blood pressure [I95.9] Status: OBS  Admit order signed?: yes  PMH:  has a past medical history of Acute on chronic combined systolic and diastolic congestive heart failure ([facility] Aortic stenosis, Atrial fibrillation ([facility] Atrial flutter ([facility] COPD (chronic obstructive pulmonary disease) ([facility] Coronary artery disease, DM (diabetes mellitus), type 2 with renal complications ([facility] DVT (deep venous thrombosis) ([facility] Hemodialysis patient ([facility] Hyperlipidemia, Hypertension, Morbid obesity with BMI of 50.0-59.9, adult ([facility] Obstructive sleep apnea, On home oxygen therapy, KELSEY treated with BiPAP, Osteoarthritis, Pacemaker, and Prostate enlargement. Procedure: none  Pertinent abnormal Imaging:CXR   Impression:        1. Moderate coronary megaly. Metallic sternotomy sutures. Permanent dual-chamber pacemaker. 2. Moderately severe pleural thickening around the periphery of right lung with some associated fibrocalcific plaque formation, not appreciably changed from prior study. Increased parenchymal markings present both mid and lower lung fields, worse on the   right, which appear chronic.  .       Medications:  Scheduled Meds:   acetaminophen  650 mg Oral Q4H    aspirin  81 mg Oral Daily    atorvastatin  40 mg Oral Daily    folbee plus  1 tablet Oral Daily    mometasone-formoterol  2 puff Inhalation BID    Ferric Citrate  1 tablet Oral TID    fludrocortisone  0.1 mg Oral Daily    insulin glargine  40 Units Subcutaneous Nightly    midodrine  10 mg Oral TID WC    midodrine  5 mg Oral TID    timolol  1 drop Both Eyes BID    insulin lispro  0-18 Units Subcutaneous TID WC    insulin lispro  0-9 Units Subcutaneous Nightly    ipratropium-albuterol  1 ampule Inhalation Q4H WA    warfarin (COUMADIN) daily dosing (placeholder)   Other RX Placeholder Continuous Infusions:   Pertinent Info/Orders/Treatment Plan: creatinine 11.8, troponin 0.134, BNP 9677.0, INR 2.52, Pharmacy dosing coumadin, diabetes management, Nephrology consult. Diet: DIET RENAL;   Smoking status:  reports that he quit smoking about 26 years ago. His smoking use included pipe and cigars. He quit after 20.00 years of use. He has quit using smokeless tobacco.  His smokeless tobacco use included chew. PCP: Lynsey Frausto MD  Readmission 30 days or less: no   %    Discharge Planning Evaluation  Current Residence:  Private Residence  Living Arrangements:  Spouse/Significant Other, Children   Support Systems:  Spouse/Significant Other, Children  Current Services PTA:     Potential Assistance Needed:  N/A  Potential Assistance Purchasing Medications:  No  Does patient want to participate in local refill/ meds to beds program?  No  Type of Home Care Services:  None  Patient expects to be discharged to:  home with wife  Expected Discharge date:  02/06/20  Follow Up Appointment: Best Day/ Time: Monday AM(M-W-F preferred due to hemo)    Patient Goals/Plan/Treatment Preferences: Met with Connor's wife Becca Lindsay while he was in dialysis they currently live at home. Vladislav Chau uses a walker and Is a hemodialysis patient at [facility] on T-TH-Sat at 1000. He uses the [facility] for transportation on Tuesdays and Thursdays. . Plan at discharge is to return home. She denies need for DME and will consider HH. Will follow. Transportation/Food Security/Housekeeping Addressed:  No issues identified.     Evaluation: no

## 2020-02-05 NOTE — H&P
[address] [place] [address]                              HISTORY AND PHYSICAL    PATIENT NAME: Melissa Pope                       :        1934  MED REC NO:   [id number]                           ROOM:       0026  ACCOUNT NO:   [de-identified]                           ADMIT DATE: 2020  PROVIDER:     Starr Aldridge M.D.      [address]:  The patient presented today with complaint of  shortness of breath. HISTORY OF PRESENT ILLNESS:  The patient is 80years old who has  end-stage renal disease, CAD, hypertension, chronic atrial fibrillation,  COPD, diabetes mellitus, old DVT who in the last few days having  worsening shortness of breath. The family, i.e., wife at the bedside  indicates that the patient had this issue about a week and did have  dialysis last Saturday on schedule. He did feel somewhat better then,  but then quickly revived to shortness of breath with very minimal  exertion. The patient typically sleeps in a recliner at home for years  now. He has also noticed some slight increase in swelling of the lower  extremities however. No cough, fever, or chills reportedly. The  patient also does not make any urine at all and desperately on dialysis;  however, he was seen at that time, was slightly hypertensive as well,  and confirmed to be on end-stage renal disease with possible congestive  heart failure. Admitted to our service. The patient denies any  pleuritic chest pain also. PAST MEDICAL HISTORY:  Looking through medical records, he does have  again chronic atrial flutter/fibrillation; COPD; diabetes; end-stage  renal disease, on hemodialysis; morbidly obese patient with pacer, dual  chamber. PAST SURGICAL HISTORY:  Remarkable for aortic valve replacement, tissue;  also the pacer. Dialysis fistula placement as well.     HOME MEDICATIONS:  To be reconciled include insulin shots, Symbicort,  Imdur, pulmonary  congestion though not reported in the official report and has massive  cardiomegaly which is chronic in nature considerate. ASSESSMENT AND PLAN:  1. CHF:  The patient is on hemodialysis three times a week. Last  dialysis was on saturday. He does need hemodialysis now. We will  continue to support him with O2 supplement and then start him on  dialysis. 2.  The other issue is bronchospasm. He does not clinically appear to  be, but we will maintain his O2 as tolerable and titrate as needed. We  will continue p.r.n. bronchodilators should there be need for such. After review of the medications, we will resume them including the blood  pressure medication, diabetes medication, and Coumadin. [address] [place] [address]  Parker Cabrera M.D.    D: 02/04/2020 15:56:22       T: 02/04/2020 18:13:32     MICHEAL_MISHA  Job#: [id number]     Doc#: [id number]    CC:

## 2020-02-05 NOTE — PROGRESS NOTES
Pharmacy Medication History Note      List of current medications patient is taking is complete. Source of information: patient's wife, Rx bottles     Changes made to medication list:  Medications removed (include reason, ex. therapy complete or physician discontinued): APAP- pt choice   Auryxia- alternate dose   Lantus 40-50 units- dose clarification   Metoprolol tartrate 12.5 mg daily- dose clarification   Warfarin 2.5 mg- alternate tablet strength     Medications added/doses adjusted: Auryxia 2 tablets TID meals- ran out ~ 1 week ago. [facility] normally is able to supply, but they had low inventory  Lantus 50 units nightly   Metoprolol Tartrate 25 mg- take 1/2 tablet Sunday, Monday, Wednesday and Friday (non-dialysis days)    Other notes (ex. Recent course of antibiotics, Coumadin dosing):   Warfarin 3 mg -3 mg every Mon, Fri; 6 mg all other days per [address] [place] [address] [facility]     Electronically signed by Brynn [facility] on 2/5/2020 at 12:02 PM

## 2020-02-05 NOTE — PROGRESS NOTES
Dr. Rina Delgado note       Internal Medicine              Patient:  Damon Urban  YOB: 1934    MRN: [id number]   Acct:  [id number]   [id number]  Primary Care Physician: Regina Hopkins MD    Admit Date: 2/4/2020           Subjective: he feels much better today with the HD. Objective:      Physical Exam:    Vitals:  Patient Vitals for the past 24 hrs:   BP Temp Temp src Pulse Resp SpO2 Height Weight   02/05/20 1300 (!) 104/52 98 °F (36.7 °C) Oral 68 18 95 % -- --   02/05/20 1239 (!) 114/56 98 °F (36.7 °C) -- 69 18 100 % -- (!) 333 lb 12.4 oz (151.4 kg)   02/05/20 0933 -- -- -- -- -- 99 % -- --   02/05/20 0745 (!) 115/57 97.8 °F (36.6 °C) -- 73 18 97 % -- (!) 343 lb 4.1 oz (155.7 kg)   02/05/20 0621 (!) 103/51 -- -- -- -- -- -- --   02/05/20 0415 (!) 86/50 -- -- -- -- -- -- --   02/05/20 0356 -- 98.4 °F (36.9 °C) Oral 68 20 97 % -- (!) 345 lb (156.5 kg)   02/04/20 2230 (!) 105/50 98 °F (36.7 °C) Oral 71 22 95 % 5' 10\" (1.778 m) (!) 347 lb 12.8 oz (157.8 kg)   02/04/20 2228 -- -- -- -- -- 98 % -- --   02/04/20 2205 118/60 98 °F (36.7 °C) -- 69 18 -- -- (!) 354 lb 11.5 oz (160.9 kg)   02/04/20 1707 (!) 107/43 97.6 °F (36.4 °C) -- 69 18 -- -- (!) 363 lb 5.1 oz (164.8 kg)   02/04/20 1625 (!) 154/72 -- -- 70 18 99 % -- --   02/04/20 1508 (!) 95/57 -- -- 70 20 95 % -- --     Weight: Weight: (!) 333 lb 12.4 oz (151.4 kg)     24 hour intake/output:    Intake/Output Summary (Last 24 hours) at 2/5/2020 1455  Last data filed at 2/5/2020 1239  Gross per 24 hour   Intake 400 ml   Output 8800 ml   Net -8400 ml       HEENT:  Head normocephalic. No icterus or pallor. NECK:  Shows some jugular venous distention at the angle of the jaw. Short neck otherwise. Trachea is central.  LUNGS:  Very, very diminished bilaterally. CARDIOVASCULAR SYSTEM:  Diffuse PMI. S1, S2 though heard. No gallop. ABDOMEN:  Morbidly obese.   Bowel sounds positive though nontender. Could not palpate for any enlarged organs due to the body habitus as it  was difficult to evaluate. NEUROLOGIC:  He is alert, oriented x3. There is no clinical evidence of  focal deficits. EXTREMITIES:  Show no edema in the upper extremities. Lower extremities  with +1 to +2 edema. Review of Labs and Diagnostic Testing:    CBC:   Recent Labs     02/04/20  1041   WBC 10.1   HGB 7.7*   HCT 26.4*   MCV 93.0        BMP:   Recent Labs     02/04/20  1041      K 5.0   CL 96*   CO2 21*   BUN 74*   CREATININE 11.8*   CALCIUM 8.5   GLUCOSE 249*     PT/INR:   Recent Labs     02/05/20  0603   INR 2.52*     APTT: No results for input(s): APTT in the last 72 hours. Lipids: No results for input(s): ALKPHOS, ALT, AST, BILITOT, BILIDIR, LABALBU, AMYLASE, LIPASE in the last 72 hours. Troponin: No results for input(s): TROPONINI in the last 72 hours. Imaging:  Xr Chest Portable    Result Date: 2/4/2020  PROCEDURE: XR CHEST PORTABLE CLINICAL INFORMATION: sob COMPARISON: 12/23/2016 TECHNIQUE: A single mobile view of the chest was obtained. 1. Moderate coronary megaly. Metallic sternotomy sutures. Permanent dual-chamber pacemaker. 2. Moderately severe pleural thickening around the periphery of right lung with some associated fibrocalcific plaque formation, not appreciably changed from prior study. Increased parenchymal markings present both mid and lower lung fields, worse on the right, which appear chronic. Sharon Bansal **This report has been created using voice recognition software. It may contain minor errors which are inherent in voice recognition technology. ** Final report electronically signed by Dr. Jacqueline Seaman on 2/4/2020 11:40 AM      EKG:      Diet: DIET RENAL;        Data:   Scheduled Meds: Scheduled Meds:   warfarin  6 mg Oral Once    acetaminophen  650 mg Oral Q4H    aspirin  81 mg Oral Daily    atorvastatin  40 mg Oral Daily    folbee plus  1 tablet Oral Daily    mometasone-formoterol  2 puff Inhalation BID    [Held by provider] Ferric Citrate  1 tablet Oral TID    fludrocortisone  0.1 mg Oral Daily    insulin glargine  40 Units Subcutaneous Nightly    midodrine  10 mg Oral TID WC    midodrine  5 mg Oral TID    timolol  1 drop Both Eyes BID    insulin lispro  0-18 Units Subcutaneous TID WC    insulin lispro  0-9 Units Subcutaneous Nightly    ipratropium-albuterol  1 ampule Inhalation Q4H WA    warfarin (COUMADIN) daily dosing (placeholder)   Other RX Placeholder     Continuous Infusions:  PRN Meds:.ammonium lactate  Continuous Infusions:      Assessment   Active Problems:    Combined systolic and diastolic congestive heart failure (HCC)    ESRD (end stage renal disease) (HCC)    Low blood pressure    Anemia due to chronic kidney disease  Resolved Problems:    * No resolved hospital problems.  *        Patient Active Problem List   Diagnosis    [employer] SINGLE PACEMAKER    KELSEY on CPAP    Type 2 diabetes mellitus with stage 4 chronic kidney disease, with long-term current use of insulin (HCC)    Combined systolic and diastolic congestive heart failure ([address]    Coronary artery disease involving native coronary artery of native heart without angina pectoris    Anemia, chronic disease    Secondary hyperparathyroidism of renal origin ([address]    Iron deficiency anemia    Vitamin D deficiency    Hyperlipidemia    Lesion of left native kidney    Essential hypertension    Prostate enlargement    ESRD (end stage renal disease) ([address]    Cholelithiases    Hyperphosphatemia    Chronic bronchitis (HCC)    Pulmonary embolus (HCC)    Atrial flutter ([address]    Anticoagulated on Coumadin    On home oxygen therapy    Morbid obesity with BMI of 50.0-59.9, adult ([address]    Hemodialysis patient ([address]    Paroxysmal atrial fibrillation ([address]    COPD (chronic obstructive pulmonary disease) ([address]    Pacemaker at end of battery life    Chronic respiratory failure with hypoxia and hypercapnia (HCC)    Low blood pressure    Anemia due to chronic kidney disease        Plan   Will keep on the current  For hd tomorrow  Plan d/c home tomorrow      Electronically signed by Robinson Wynn MD on 2/5/2020 at 2:55 PM  Over 35 mins spent on this evaluation

## 2020-02-06 PROBLEM — I50.42 CHF NYHA CLASS III, CHRONIC, COMBINED (HCC): Status: ACTIVE | Noted: 2020-01-01

## 2020-02-06 NOTE — CARE COORDINATION
2/6/20, 3:11 PM    Patient goals/plan/ treatment preferences discussed by  and . Patient goals/plan/ treatment preferences reviewed with patient/ family. Patient/ family verbalize understanding of discharge plan and are in agreement with goal/plan/treatment preferences. Understanding was demonstrated using the teach back method. AVS provided by RN at time of discharge, which includes all necessary medical information pertaining to the patients current course of illness, treatment, post-discharge goals of care, and treatment preferences. IMM Letter  IMM Letter given to Patient/Family/Significant other/Guardian/POA/by[de-identified] staff  IMM Letter date given[de-identified] 02/06/20  IMM Letter time given[de-identified] 0391     Pt to be discharged to home with spouse. Denies needs or services.

## 2020-02-06 NOTE — DISCHARGE INSTR - MEDS
Recommendations for discharge:   Date Warfarin Dose   2/6/2020 6 mg   2/7/2020 6 mg   2/8/2020 6 mg   2/9/2020 6 mg   2/10/2020 3 mg   2/11/2020 6 mg   2/12/2020 INR       Provider dosing warfarin: [facility] INR:  Wednesday, 2/12/2020 at 3 PM

## 2020-02-06 NOTE — PLAN OF CARE
Problem: Falls - Risk of:  Goal: Will remain free from falls  Description  Will remain free from falls  Outcome: Ongoing  Note:   Discussed use of alarms and hourly rounding with patient. Belongings within reach including call light. Up with gait belt, non skid footwear and walker. Problem: Risk for Impaired Skin Integrity  Goal: Tissue integrity - skin and mucous membranes  Description  Structural intactness and normal physiological function of skin and  mucous membranes. Outcome: Ongoing  Note:   Skin assessed every 4 hours prn. Assisting with turns as needed. Problem: Fluid Volume:  Goal: Will show no signs or symptoms of fluid imbalance  Description  Will show no signs or symptoms of fluid imbalance  Outcome: Ongoing  Note:   Hemodialysis patient with treatment in am. Daily wt. Strict I&O     Problem: Discharge Planning:  Goal: Discharged to appropriate level of care  Description  Discharged to appropriate level of care  Outcome: Ongoing  Note:   Plans discharge to home with wife. Continue to assess discharge needs      Problem: Serum Glucose Level - Abnormal:  Goal: Ability to maintain appropriate glucose levels will improve  Description  Ability to maintain appropriate glucose levels will improve  Outcome: Ongoing  Note:   Chems ACHS, renal diet. Insulin as ordered     Problem: Pain:  Goal: Pain level will decrease  Description  Pain level will decrease  Outcome: Ongoing  Note:   0-10 pain scale explained and utilized with a pain goal of no pain. Able to voice pain concerns well. Assisted with repositioning. Denies pain this shift. Care plan reviewed with patient. Patient verbalize understanding of the plan of care and contribute to goal setting.   No family present
Problem: Impaired respiratory status  Goal: Lung sounds clear or within normal limits for patient  2/6/2020 0329 by Ame Garrido RCP  Outcome: Ongoing  Note:   Pt has clear/diminished breath sounds. Txs to help improve lung aeration.
Problem: Impaired respiratory status  Goal: Lung sounds clear or within normal limits for patient  Outcome: Ongoing   Breaths sounds clear, continuing treatments as ordered.
Planning:  Goal: Discharged to appropriate level of care  Description  Discharged to appropriate level of care   2/6/2020 1053 by Otis Lopez RN  Outcome: Ongoing  Note:   Plan to discharge home with wife   2/6/2020 1053 by Otis Lopez RN  Reactivated  2/6/2020 0800 by Otis Lopez RN  Outcome: Completed     Problem: Serum Glucose Level - Abnormal:  Goal: Ability to maintain appropriate glucose levels will improve  Description  Ability to maintain appropriate glucose levels will improve   2/6/2020 1053 by Otis Lopez RN  Outcome: Ongoing  Note:   Chems ACHS, insulin SS  2/6/2020 1053 by Otis Lopez RN  Reactivated  2/6/2020 0800 by Otis Lopez RN  Outcome: Completed     Problem: Pain:  Description  Pain management should include both nonpharmacologic and pharmacologic interventions. Goal: Pain level will decrease  Description  Pain level will decrease   2/6/2020 1053 by Otis Lopez RN  Outcome: Ongoing  Note:   Patient denies pain so far this shift, will continue to monitor.      2/6/2020 1053 by Otis Lopez RN  Reactivated  2/6/2020 0800 by Otis Lopez RN  Outcome: Completed     Problem: Impaired respiratory status  Goal: Lung sounds clear or within normal limits for patient  2/6/2020 1053 by Otis Lopez RN  Outcome: Ongoing  Note:   Lungs clear and diminished     2/6/2020 0800 by Otis Lopez RN  Outcome: Completed
patient and family. Patient and family verbalize understanding of the plan of care and contribute to goal setting.

## 2020-02-06 NOTE — FLOWSHEET NOTE
02/06/20 0815   Provider Notification   Reason for Communication Review case  (discharge, outpatient HD at 9:50)   Provider Name Dr. Jennifer Mcdaniels   Provider Notification Physician   Method of Communication Secure Message   Response Waiting for response   Notification Time 0815   : Call from Dr. Jennifer Mcdaniels, unsure about discharge    1331: Notified Dr. Dwayne Macdonald: call back from Dr. Debbie Schumacher, will give patient inpatient dialysis.      This RN notified dialysis staff, updated patient, updated Dr. Jennifer Mcdaniels

## 2020-02-06 NOTE — PROGRESS NOTES
Clinical Pharmacy Note                                               Warfarin Discharge Recommendations      Pt discharged from [facility] today after admission for fluid overload     INR today:  Recent Labs     02/06/20  0526   INR 2.01*       Coumadin 3 mg tabs    Interacting medications at discharge: Aspirin    INR goal during admission:2-3    Recommendations for discharge:   Date Warfarin Dose   2/6/2020 6 mg   2/7/2020 6 mg   2/8/2020 6 mg   2/9/2020 6 mg   2/10/2020 3 mg   2/11/2020 6 mg   2/12/2020 INR       Provider dosing warfarin: [facility] INR:  [facility] PharmD 2/6/2020 12:08 PM

## 2020-02-06 NOTE — PROGRESS NOTES
CLINICAL PHARMACY: DISCHARGE MED RECONCILIATION/REVIEW    [facility] ([facility]) Select Patient?: No  Total # of Interventions Recommended: 2 - Increased Dose #: 1  - Updated Order #: 1   -   Total # Interventions Accepted: 2  Intervention Severity:   - Level 1 Intervention Present?: No   - Level 2 #: 1   - Level 3 #: 1   Time Spent (min): 805 [facility] PharmD 2/6/2020 1:06 PM

## 2020-02-06 NOTE — PROGRESS NOTES
Discharge teaching and instructions for diagnosis/procedure of low BP completed with patient using teachback method. AVS reviewed. Printed prescriptions given to patient. Patient voiced understanding regarding prescriptions, follow up appointments, and care of self at home. Discharged ambulatory and in a wheelchair to  home with support per family     Educated on follow up appointments, educated on coumadin instructions and that he still needs to take tonight's dose, showed him recommendations from pharmacy. Educated on new medications and which he still needs to take tonight. No questions or concerns voiced. Discharged with his home oxygen.

## 2020-02-06 NOTE — DISCHARGE INSTR - DIET

## 2020-02-06 NOTE — DISCHARGE SUMMARY
Dr. Candelaria Brothers Discharge Summary  2/6/2020  11:42 AM    Patient:  Rudy Carreno  YOB: 1934    MRN: [id number]   Acct: [id number]   [id number]   Primary Care Physician: Elena Echeverria MD    Admit date:  2/4/2020    Discharge date:  2/6/2020    Discharge Diagnoses:    Patient Active Problem List   Diagnosis    [facility] KELSEY on CPAP    Type 2 diabetes mellitus with stage 4 chronic kidney disease, with long-term current use of insulin (HCC)    Combined systolic and diastolic congestive heart failure ([address]    Coronary artery disease involving native coronary artery of native heart without angina pectoris    Anemia, chronic disease    Secondary hyperparathyroidism of renal origin ([address]    Iron deficiency anemia    Vitamin D deficiency    Hyperlipidemia    Lesion of left native kidney    Essential hypertension    Prostate enlargement    ESRD (end stage renal disease) ([address]    Cholelithiases    Hyperphosphatemia    Chronic bronchitis ([address]    Pulmonary embolus ([address]    Atrial flutter ([address]    Anticoagulated on Coumadin    On home oxygen therapy    Morbid obesity with BMI of 50.0-59.9, adult ([address]    Hemodialysis patient ([address]    Paroxysmal atrial fibrillation ([address]    COPD (chronic obstructive pulmonary disease) ([address]    Pacemaker at end of battery life    Chronic respiratory failure with hypoxia and hypercapnia (HCC)    Low blood pressure    Anemia due to chronic kidney disease    CHF NYHA class III, chronic, combined ([address]       Discharge Medications:       Marilee Machado   Home Medication Instructions [id number]:[id number]    Printed on:02/06/20 1142   Medication Information                      acetaminophen (TYLENOL) 325 MG tablet  Take 2 tablets by mouth every 4 hours Indications: Pain             ammonium lactate (AMLACTIN) 12 % cream  2 times daily as needed To feet             aspirin 81 MG chewable tablet  Take 1 tablet by mouth daily nontender. Could not palpate for any enlarged organs due to the body habitus as it  was difficult to evaluate. NEUROLOGIC:  He is alert, oriented x3. [place] Hoose is no clinical evidence of  focal deficits. EXTREMITIES:  Show no edema in the upper extremities.  Lower extremities  with +1 to +2 edema. Disposition: home    Condition: Stable        Socrates Joseph MD     Copy: Primary Care Physician: Nan Jaimes MD  Internal Medicine  Over 30 mins spent.

## 2020-02-06 NOTE — FLOWSHEET NOTE
02/06/20 0905 02/06/20 1405   Vital Signs   BP (!) 102/59 (!) 94/46   Temp 98.7 °F (37.1 °C) 97.9 °F (36.6 °C)   Pulse 75 70   Resp 17 21   SpO2 100 % 95 %   Weight (!) 336 lb 6.8 oz (152.6 kg) (!) 334 lb (151.5 kg)   Weight Method Bed scale Bed scale   Percent Weight Change -0.2 -0.72   Post-Hemodialysis Assessment   Post-Treatment Procedures  --  Blood returned; Access bleeding time < 10 minutes   Machine Disinfection Process  --  Acid/Vinegar Clean;Heat Disinfect; Exterior Machine Disinfection   Rinseback Volume (ml)  --  400 ml   Total Liters Processed (l/min)  --  111.5 l/min   Dialyzer Clearance  --  Lightly streaked   Duration of Treatment (minutes)  --  270 minutes   Heparin amount administered during treatment (units)  --  0 units   Hemodialysis Output (ml)  --  1400 ml   NET Removed (ml)  --  1000 ml   Tolerated Treatment  --  Good   PT completed stable 4.5 HR TX and removed 1 liter of fluid. PT TX terminated and all blood returned back to PT at end of [address]. Removed X2 needles 15g with clean gauze applied with light pressure at sites. Prior to PT returning to his room his access dressing is clean, dry and intact. Record printed and then to be scanned into PT's EMR. Report given to primary nurse.

## 2020-02-06 NOTE — PROGRESS NOTES
Renal Progress Note    Assessment and Plan:    1. End stage kidney disease on hemodialysis. 2.  Hypertension primary. 3.  Diabetes mellitus type 2.  4.  Volume overload resolved. 5.  Macrocytic anemia  PLAN:   Labs reviewed  Medications reviewed. No changes  Seen in the hemodialysis unit. Procedure in progress. Tolerating treatment well. Hemodynamically stable. Okay to discharge when discharged by the primary service. Patient Active Problem List:     ST THUY SINGLE PACEMAKER     KELSEY on CPAP     Type 2 diabetes mellitus with stage 4 chronic kidney disease, with long-term current use of insulin (HCC)     Combined systolic and diastolic congestive heart failure (HCC)     Coronary artery disease involving native coronary artery of native heart without angina pectoris     Anemia, chronic disease     Secondary hyperparathyroidism of renal origin ([address]     Iron deficiency anemia     Vitamin D deficiency     Hyperlipidemia     Lesion of left native kidney     Essential hypertension     Prostate enlargement     ESRD (end stage renal disease) (HCC)     Cholelithiases     Hyperphosphatemia     Chronic bronchitis (HCC)     Pulmonary embolus (HCC)     Atrial flutter (HCC)     Anticoagulated on Coumadin     On home oxygen therapy     Morbid obesity with BMI of 50.0-59.9, adult ([address]     Hemodialysis patient ([address]     Paroxysmal atrial fibrillation ([address]     COPD (chronic obstructive pulmonary disease) ([address]     Pacemaker at end of battery life     Chronic respiratory failure with hypoxia and hypercapnia (HCC)     Low blood pressure     Anemia due to chronic kidney disease     CHF NYHA class III, chronic, combined ([address]      Subjective:   Admit Date: 2/4/2020    Interval History:   Seen for end-stage renal hemodialysis. Awake and alert. No complaint. Blood pressure is stable. Updated by the staff. No new issues.         Medications:   Scheduled Meds:   acetaminophen  650 mg Oral Q4H    aspirin  81 mg Oral Daily    atorvastatin  40 mg Oral Daily    folbee plus  1 tablet Oral Daily    mometasone-formoterol  2 puff Inhalation BID    [Held by provider] Ferric Citrate  1 tablet Oral TID    fludrocortisone  0.1 mg Oral Daily    insulin glargine  40 Units Subcutaneous Nightly    midodrine  10 mg Oral TID     midodrine  5 mg Oral TID    timolol  1 drop Both Eyes BID    insulin lispro  0-18 Units Subcutaneous TID WC    insulin lispro  0-9 Units Subcutaneous Nightly    ipratropium-albuterol  1 ampule Inhalation Q4H WA    warfarin (COUMADIN) daily dosing (placeholder)   Other RX Placeholder     Continuous Infusions:   dextrose         CBC:   Recent Labs     02/04/20  1041 02/06/20  0526   WBC 10.1 8.9   HGB 7.7* 8.1*    265     CMP:    Recent Labs     02/04/20  1041 02/06/20  0526    136   K 5.0 4.2   CL 96* 93*   CO2 21* 26   BUN 74* 29*   CREATININE 11.8* 6.0*   GLUCOSE 249* 109*   CALCIUM 8.5 8.1*   LABGLOM 5* 11*     Troponin: No results for input(s): TROPONINI in the last 72 hours. BNP: No results for input(s): BNP in the last 72 hours. INR:   Recent Labs     02/04/20  2241 02/05/20  0603 02/06/20  0526   INR 2.44* 2.52* 2.01*     Lipids: No results for input(s): CHOL, LDLDIRECT, TRIG, HDL, AMYLASE, LIPASE in the last 72 hours. Liver: No results for input(s): AST, ALT, ALKPHOS, PROT, LABALBU, BILITOT in the last 72 hours. Invalid input(s): BILDIR  Iron:  No results for input(s): IRONS, FERRITIN in the last 72 hours. Invalid input(s): LABIRONS  XR CHEST PORTABLE   Final Result   1. Moderate coronary megaly. Metallic sternotomy sutures. Permanent dual-chamber pacemaker. 2. Moderately severe pleural thickening around the periphery of right lung with some associated fibrocalcific plaque formation, not appreciably changed from prior study. Increased parenchymal markings present both mid and lower lung fields, worse on the    right, which appear chronic. Prabhu Ruvalcaba             **This report has been created using voice recognition software. It may contain minor errors which are inherent in voice recognition technology. **      Final report electronically signed by Dr. Junie Reynolds on 2/4/2020 11:40 AM            Objective:   Vitals: BP (!) 102/59   Pulse 75   Temp 98.7 °F (37.1 °C)   Resp 17   Ht 5' 10\" (1.778 m)   Wt (!) 336 lb 6.8 oz (152.6 kg)   SpO2 99% Comment: home o2  BMI 48.27 kg/m²    Wt Readings from Last 3 Encounters:   02/06/20 (!) 336 lb 6.8 oz (152.6 kg)   01/28/20 (!) 348 lb (157.9 kg)   01/08/20 (!) 352 lb 3.2 oz (159.8 kg)      24HR INTAKE/OUTPUT:      Intake/Output Summary (Last 24 hours) at 2/6/2020 1225  Last data filed at 2/6/2020 0511  Gross per 24 hour   Intake 550 ml   Output 4400 ml   Net -3850 ml       Constitutional:  Alert, awake, no apparent distress   Skin:normal with no rash or lesions. HEENT:Pupils are reactive . Throat is clear . Oral mucosa is moist   Neck:supple with no thyromegaly or carotid bruit  Cardiovascular:  S1, S2 without murmur or rubs   Respiratory:  Clear to ausculation without wheezes, rhonchi or rales. Abdomen: +bs, soft, no tenderness to palpation and no palpable mass. No abdominal bruit   Ext: No LE edema  Musculoskeletal:Intact  Neuro:Alert and oriented with no focal deficit. Speech is normal      Electronically signed by Lalitha Dudley MD on 2/6/2020 at 12:25 PM

## 2020-02-11 NOTE — TELEPHONE ENCOUNTER
Left msge and returning phone call to dietitian Janie Anna at [facility] - explained that patient did not have an appt tomorrow with the outpatient dietitian at the [place] [id number], but he has an Appt tomorrow 2/12/2020 @ 3 pm with the coumadin clinic.

## 2020-02-11 NOTE — PROGRESS NOTES
Post-Discharge Transitional Care Management Services or Hospital Follow Up      Shell Felipe   YOB: 1934    Date of Office Visit:  2/11/2020  Date of Hospital Admission: 2/4/20  Date of Hospital Discharge: 2/6/20  Readmission Risk Score(high >=14%.  Medium >=10%):Readmission Risk Score: 24      Care management risk score Rising risk (score 2-5) and Complex Care (Scores >=6): 8     Non face to face  following discharge, date last encounter closed (first attempt may have been earlier): *No documented post hospital discharge outreach found in the last 14 days *No documented post hospital discharge outreach found in the last 14 days    Call initiated 2 business days of discharge: *No response recorded in the last 14 days     Patient Active Problem List   Diagnosis    [facility] KELSEY on CPAP    Type 2 diabetes mellitus with stage 4 chronic kidney disease, with long-term current use of insulin ([address]    Combined systolic and diastolic congestive heart failure ([address]    Coronary artery disease involving native coronary artery of native heart without angina pectoris    Anemia, chronic disease    Secondary hyperparathyroidism of renal origin ([address]    Iron deficiency anemia    Vitamin D deficiency    Hyperlipidemia    Lesion of left native kidney    Essential hypertension    Prostate enlargement    ESRD (end stage renal disease) ([address]    Cholelithiases    Hyperphosphatemia    Chronic bronchitis (HCC)    Pulmonary embolus ([address]    Atrial flutter ([address]    Anticoagulated on Coumadin    On home oxygen therapy    Morbid obesity with BMI of 50.0-59.9, adult ([address]    Hemodialysis patient ([address]    Paroxysmal atrial fibrillation ([address]    COPD (chronic obstructive pulmonary disease) ([address]    Pacemaker at end of battery life    Chronic respiratory failure with hypoxia and hypercapnia (HCC)    Low blood pressure    Anemia due to chronic kidney disease    CHF NYHA class III, chronic, combined ([facility]       No Known Allergies    Medications listed as ordered at the time of discharge from hospital   [facility]ENN:    Printed on:02/11/20 5075   Medication Information                      acetaminophen (TYLENOL) 325 MG tablet  Take 2 tablets by mouth every 4 hours Indications: Pain             ammonium lactate (AMLACTIN) 12 % cream  2 times daily as needed To feet             aspirin 81 MG chewable tablet  Take 1 tablet by mouth daily             atorvastatin (LIPITOR) 40 MG tablet  Take 1 tablet by mouth daily             B Complex-C-Folic Acid (ALBAN CAPS) 1 MG CAPS  TAKE ONE CAPSULE BY MOUTH EVERY DAY             budesonide-formoterol (SYMBICORT) 80-4.5 MCG/ACT AERO  Inhale 2 puffs into the lungs 2 times daily Rinse mouth after its use.              Ferric Citrate (AURYXIA) 1  MG(Fe) TABS  Take 1 tablet by mouth 3 times daily             fludrocortisone (FLORINEF) 0.1 MG tablet  Take 1 tablet by mouth daily Indications: Blood Pressure Drop Upon Standing             fluticasone (FLONASE) 50 MCG/ACT nasal spray  SPRAY 2 SPRAYS INTO EACH NOSTRIL EVERY DAY             insulin aspart (NOVOLOG FLEXPEN) 100 UNIT/ML injection pen  2-16 units 3 times a day as per scale             insulin glargine (LANTUS SOLOSTAR) 100 UNIT/ML injection pen  Inject 50 Units into the skin nightly             Insulin Pen Needle (B-D UF III MINI PEN NEEDLES) 31G X 5 MM MISC  1 each by Does not apply route 4 times daily             isosorbide mononitrate (IMDUR) 30 MG extended release tablet  Take 1 tablet by mouth daily Indications: Treatment to Prevent Angina             metoprolol tartrate (LOPRESSOR) 25 MG tablet  Take 12.5 mg by mouth See Admin Instructions Sunday, Monday, Wednesday and Friday (non-dialysis days only)             midodrine (PROAMATINE) 10 MG tablet  Take 1 tablet by mouth 3 times daily (with meals) Take with 5 mg to total 15 mg             midodrine MCG/ACT AERO Inhale 2 puffs into the lungs 2 times daily Rinse mouth after its use. 1 Inhaler 11    isosorbide mononitrate (IMDUR) 30 MG extended release tablet Take 1 tablet by mouth daily Indications: Treatment to Prevent Angina 90 tablet 1    fludrocortisone (FLORINEF) 0.1 MG tablet Take 1 tablet by mouth daily Indications: Blood Pressure Drop Upon Standing 90 tablet 1    tamsulosin (FLOMAX) 0.4 MG capsule Take 1 capsule by mouth daily 90 capsule 1    atorvastatin (LIPITOR) 40 MG tablet Take 1 tablet by mouth daily 90 tablet 1    warfarin (COUMADIN) 3 MG tablet Take as directed by [facility]. 200 tablets for 90 days 200 tablet 3    fluticasone (FLONASE) 50 MCG/ACT nasal spray SPRAY 2 SPRAYS INTO EACH NOSTRIL EVERY DAY 48 g 0    midodrine (PROAMATINE) 5 MG tablet 5 mg 3 times daily (with meals) With 10 mg to total 15 mg TID  4    ammonium lactate (AMLACTIN) 12 % cream 2 times daily as needed To feet  11    B Complex-C-Folic Acid (ALBAN CAPS) 1 MG CAPS TAKE ONE CAPSULE BY MOUTH EVERY DAY  11    Insulin Pen Needle (B-D UF III MINI PEN NEEDLES) 31G X 5 MM MISC 1 each by Does not apply route 4 times daily 300 each 1    ONETOUCH VERIO strip TEST BLOOD SUGAR 3 TIMES A DAY E11.9 300 strip 3    aspirin 81 MG chewable tablet Take 1 tablet by mouth daily 100 tablet 1    timolol (TIMOPTIC) 0.5 % ophthalmic solution Place 1 drop into both eyes 2 times daily Indications: Disease of the Eye 15 mL 1    OXYGEN by Nasal route See Admin Instructions Indications: Oxygen Therapy          Medications patient taking as of now reconciled against medications ordered at time of hospital discharge: Yes    Chief Complaint   Patient presents with    Follow-Up from Hospital       HPI     Admitted 2/4/20 yo 2/6/20 due to SOB , got dialyzed daily for 3 day and lsoo few pound , now he is back to 3 times a week dialysis    Patient is doing well , No SOB, No chest pain , No fatigue.  No nausea nor abdominal rate and regular rhythm. Heart sounds: No murmur. No friction rub. Pulmonary:      Effort: Pulmonary effort is normal.      Breath sounds: Decreased breath sounds present. No wheezing or rales. Chest:      Chest wall: No tenderness. Abdominal:      General: Bowel sounds are normal.      Palpations: Abdomen is soft. There is no mass. Tenderness: There is no abdominal tenderness. Lymphadenopathy:      Cervical: No cervical adenopathy. Skin:     Findings: No rash. Neurological:      Mental Status: He is alert and oriented to person, place, and time. Psychiatric:         Behavior: Behavior is cooperative. Assessment/Plan:  1. Essential hypertension      2. Type 2 diabetes mellitus with stage 4 chronic kidney disease, with long-term current use of insulin ([facility]      3. Chronic obstructive pulmonary disease, unspecified COPD type ([facility]      4. ESRD on dialysis ([facility]      5. Hemodialysis patient ([facility]      6. Chronic respiratory failure with hypoxia and hypercapnia [facility])          Medical Decision Making: moderate complexity      Lab Results   Component Value Date    LABA1C 7.6 (A) 01/28/2020    LABA1C 7.8 10/22/2019    LABA1C 7.7 07/18/2019     Lab Results   Component Value Date    LDLCALC 151 07/09/2018    CREATININE 6.0 ([facility]) 02/06/2020     New Prescriptions    No medications on file     Updated home medications.  (New prescriptions plus current medications)   Current Outpatient Medications   Medication Sig Dispense Refill    acetaminophen (TYLENOL) 325 MG tablet Take 2 tablets by mouth every 4 hours Indications: Pain 120 tablet 3    Ferric Citrate (AURYXIA) 1  MG(Fe) TABS Take 1 tablet by mouth 3 times daily 270 tablet 1    midodrine (PROAMATINE) 10 MG tablet Take 1 tablet by mouth 3 times daily (with meals) Take with 5 mg to total 15 mg 270 tablet 1    insulin glargine (LANTUS SOLOSTAR) 100 UNIT/ML injection pen Inject 50 Units into the skin nightly      metoprolol tartrate (LOPRESSOR) 25 MG tablet Take 12.5 mg by mouth See Admin Instructions Sunday, Monday, Wednesday and Friday (non-dialysis days only)      insulin aspart (NOVOLOG FLEXPEN) 100 UNIT/ML injection pen 2-16 units 3 times a day as per scale 45 pen 3    Spacer/Aero-Holding Chambers ZOHREH 1 Device by Does not apply route 2 times daily With Symbicort inhaler 1 Device 0    SALINE NASAL SPRAY NA 1 spray by Nasal route as needed Indications: Dry Nose       budesonide-formoterol (SYMBICORT) 80-4.5 MCG/ACT AERO Inhale 2 puffs into the lungs 2 times daily Rinse mouth after its use. 1 Inhaler 11    isosorbide mononitrate (IMDUR) 30 MG extended release tablet Take 1 tablet by mouth daily Indications: Treatment to Prevent Angina 90 tablet 1    fludrocortisone (FLORINEF) 0.1 MG tablet Take 1 tablet by mouth daily Indications: Blood Pressure Drop Upon Standing 90 tablet 1    tamsulosin (FLOMAX) 0.4 MG capsule Take 1 capsule by mouth daily 90 capsule 1    atorvastatin (LIPITOR) 40 MG tablet Take 1 tablet by mouth daily 90 tablet 1    warfarin (COUMADIN) 3 MG tablet Take as directed by [facility].  200 tablets for 90 days 200 tablet 3    fluticasone (FLONASE) 50 MCG/ACT nasal spray SPRAY 2 SPRAYS INTO EACH NOSTRIL EVERY DAY 48 g 0    midodrine (PROAMATINE) 5 MG tablet 5 mg 3 times daily (with meals) With 10 mg to total 15 mg TID  4    ammonium lactate (AMLACTIN) 12 % cream 2 times daily as needed To feet  11    B Complex-C-Folic Acid (ALBAN CAPS) 1 MG CAPS TAKE ONE CAPSULE BY MOUTH EVERY DAY  11    Insulin Pen Needle (B-D UF III MINI PEN NEEDLES) 31G X 5 MM MISC 1 each by Does not apply route 4 times daily 300 each 1    ONETOUCH VERIO strip TEST BLOOD SUGAR 3 TIMES A DAY E11.9 300 strip 3    aspirin 81 MG chewable tablet Take 1 tablet by mouth daily 100 tablet 1    timolol (TIMOPTIC) 0.5 % ophthalmic solution Place 1 drop into both eyes 2 times daily Indications: Disease of the Eye 15 mL 1    OXYGEN by Nasal

## 2020-03-09 NOTE — PROGRESS NOTES
[facility] David Peter         [id number]  3/9/2020   Chief Complaint   Patient presents with    Follow-up     Pt machine not working properly, needs F2F for new machine and parts        Pt of Dr. Brendon JAMISON Download:   Original or initial AHI: NA   Date of initial study: NA  Weight of initial study: NA  3/7/16 re-titration   Weight of initial study: Unknown    Neck Size: 19  Mallampati Mallampati 3  ESS:  6    Here is a scan of the most recent download:  NA    Presentation:   Florecita Winn presents for sleep medicine follow up for obstructive sleep apnea. Since the last visit, Connor's BiPAP machine is no longer working properly and is obsolete and unable to obtain parts to fix. Prior to this, he had been compliant and doing well (last seen in Sept 2019). Equipment issues: The pressure is acceptable, the mask is acceptable and he is using the humidity. Sleep issues:  Do you feel better? Yes  More rested? Yes   Better concentration? yes    Progress History:   Since last visit any new medical issues? No  New ER or hospitlal visits? No  Any new or changes in medicines? No  Any new sleep medicines?  No      Past Medical History:   Diagnosis Date    Acute on chronic combined systolic and diastolic congestive heart failure (HCC)     Aortic stenosis     Atrial fibrillation ([address] 1/25/2017    Atrial flutter ([address] 1/25/2017    COPD (chronic obstructive pulmonary disease) ([address]     Coronary artery disease     DM (diabetes mellitus), type 2 with renal complications ([address]     DVT (deep venous thrombosis) ([address]     Hemodialysis patient ([address] 10/22/2016    [facility]    Hyperlipidemia     Hypertension     Morbid obesity with BMI of 50.0-59.9, adult ([address]     Obstructive sleep apnea     On home oxygen therapy 2013    KELSEY treated with BiPAP     Osteoarthritis     Pacemaker     [place] dual pacer    Prostate enlargement        Past Surgical     Technically limited d/t body habitus. Preserved systolic function of LV w/ diffuse LVH. EF 50-55%. Moderate to moderately severe AS. Trace AI. Slight enlargement of LA, trace MR. RA and RV normal contractility. Trace TR. Pulmonary vein and pulmonary valve were not visualized very well, neither aortic arch.  UPPER GASTROINTESTINAL ENDOSCOPY         Social History     Tobacco Use    Smoking status: Former Smoker     Years: 20.00     Types: Pipe, Cigars     Last attempt to quit: 1993     Years since quittin.0    Smokeless tobacco: Former User     Types: Chew   Substance Use Topics    Alcohol use: No    Drug use: No       No Known Allergies    Current Outpatient Medications   Medication Sig Dispense Refill    acetaminophen (TYLENOL) 325 MG tablet Take 2 tablets by mouth every 4 hours Indications: Pain (Patient not taking: Reported on 2020) 120 tablet 3    Ferric Citrate (AURYXIA) 1  MG(Fe) TABS Take 1 tablet by mouth 3 times daily 270 tablet 1    midodrine (PROAMATINE) 10 MG tablet Take 1 tablet by mouth 3 times daily (with meals) Take with 5 mg to total 15 mg 270 tablet 1    insulin glargine (LANTUS SOLOSTAR) 100 UNIT/ML injection pen Inject 50 Units into the skin nightly      metoprolol tartrate (LOPRESSOR) 25 MG tablet Take 12.5 mg by mouth See Admin Instructions , Monday, Wednesday and Friday (non-dialysis days only)      insulin aspart (NOVOLOG FLEXPEN) 100 UNIT/ML injection pen 2-16 units 3 times a day as per scale 45 pen 3    Spacer/Aero-Holding Chambers ZOHREH 1 Device by Does not apply route 2 times daily With Symbicort inhaler 1 Device 0    SALINE NASAL SPRAY NA 1 spray by Nasal route as needed Indications: Dry Nose       budesonide-formoterol (SYMBICORT) 80-4.5 MCG/ACT AERO Inhale 2 puffs into the lungs 2 times daily Rinse mouth after its use.  1 Inhaler 11    isosorbide mononitrate (IMDUR) 30 MG extended release tablet Take 1 tablet by mouth daily Indications: Treatment to Prevent Angina 90 tablet 1    fludrocortisone (FLORINEF) 0.1 MG tablet Take 1 tablet by mouth daily Indications: Blood Pressure Drop Upon Standing 90 tablet 1    tamsulosin (FLOMAX) 0.4 MG capsule Take 1 capsule by mouth daily 90 capsule 1    atorvastatin (LIPITOR) 40 MG tablet Take 1 tablet by mouth daily 90 tablet 1    warfarin (COUMADIN) 3 MG tablet Take as directed by [facility]. 200 tablets for 90 days 200 tablet 3    fluticasone (FLONASE) 50 MCG/ACT nasal spray SPRAY 2 SPRAYS INTO EACH NOSTRIL EVERY DAY 48 g 0    midodrine (PROAMATINE) 5 MG tablet 5 mg 3 times daily (with meals) With 10 mg to total 15 mg TID  4    ammonium lactate (AMLACTIN) 12 % cream 2 times daily as needed for Dry Skin To feet  11    B Complex-C-Folic Acid (ALBAN CAPS) 1 MG CAPS TAKE ONE CAPSULE BY MOUTH EVERY DAY  11    Insulin Pen Needle (B-D UF III MINI PEN NEEDLES) 31G X 5 MM MISC 1 each by Does not apply route 4 times daily 300 each 1    ONETOUCH VERIO strip TEST BLOOD SUGAR 3 TIMES A DAY E11.9 300 strip 3    aspirin 81 MG chewable tablet Take 1 tablet by mouth daily 100 tablet 1    timolol (TIMOPTIC) 0.5 % ophthalmic solution Place 1 drop into both eyes 2 times daily Indications: Disease of the Eye 15 mL 1    OXYGEN by Nasal route See Admin Instructions Indications: Oxygen Therapy       No current facility-administered medications for this visit. Family History   Problem Relation Age of Onset    Diabetes Father     Diabetes Sister     Diabetes Brother           Review of Systems   General/Constitutional: No recent loss of weight or appetite changes. No fever or chills. HENT: Negative. Eyes: Negative. Upper respiratory tract: No nasal stuffiness or post nasal drip. Lower respiratory tract/ lungs: No cough or sputum production. No hemoptysis. Cardiovascular: No palpitations or chest pain. Gastrointestinal: No nausea or vomiting.   Neurological: No focal neurologiacal

## 2020-03-18 NOTE — PROGRESS NOTES
During appointment, discussed possibility of having INR drawn at HD sessions with patient and wife. Explained that [facility] has agreed to [facility]Rs for our patients during this time as a courtesy. This will extend through the COVID-19 situation and will NOT continue after it has resolved. Explained that INR result would be faxed to [facility] and that the patient would receive a phone call to conduct anticoagulation interview and to provide warfarin dosing instructions. Patient and wife were agreeable to this plan. Patient stated that he has blood drawn on Thursdays when he goes to HD. Ordered 1x PT/INR to be drawn 4/23/2020 with the following comment: \"Lab to be drawn on Thursday 4/23/2020 before any medication or heparinization. Results to be faxed to [facility] at [phone]. \"    Order faxed to Becky ([phone]). Next INR check anticipated for 4/23/2020 (5 weeks) to be drawn at HD. In clinic appointment arranged for 4/22/2020 in case the situation changes or [facility] becomes unwilling to draw this lab. Jacinta Marcos, PharmD  PGY-1 Pharmacy Resident  3/18/2020, 1:26 PM      Correction to the above - Error in fax number. Fax from 3/18/2020 failed. Updated [facility] fax number, [phone]. Fax of lab order re-sent to [facility]. Labs will be processed by [facility] lab at [address],[facility] [address]. Anticipate that lab result will populate in [facility]. Jacinta MICHELLE  [facility], PharmD  PGY-1 Pharmacy Resident  3/19/2020, 2:11 PM

## 2020-03-18 NOTE — PROGRESS NOTES
Medication Management [address]   [phone] (phone)  [phone] (fax)      Mr. Guy Faith is a 80 y.o.  male with history of Afib who presents today for anticoagulation monitoring and adjustment. Patient verifies current dosing regimen and tablet strength. No missed or extra doses. Patient denies s/s bleeding/bruising/swelling/SOB/chest pain   No blood in urine or stool. No dietary changes. No changes in medication/OTC agents/Herbals. No change in alcohol use or tobacco use. No change in activity level. Denies headaches/dizziness/falls. Lightheaded after dialysis. No vomiting/diarrhea or acute illness. No Procedures scheduled in the future at this time. Patient interview performed by Cori Rashid PharmD Candidate [id number]. Assessment:   Lab Results   Component Value Date    INR 2.40 (H) 03/18/2020    INR 2.20 (H) 02/26/2020    INR 1.90 (H) 02/12/2020     INR therapeutic   Recent Labs     03/18/20  1149   INR 2.40*     Plan:  Continue Coumadin 6mg SuTuWThSa and 3mg MonFri. Recheck INR in 5 weeks. Plan to check INR via blood draw at [facility]). Appointment scheduled as back up in clinic in case COVID-19 concern is resolved or [facility] becomes unwilling to draw lab. Patient reminded to call the Anticoagulation Clinic with any signs or symptoms of bleeding or with any medication changes. Patient given instructions utilizing the teach back method. Discharged ambulatory in no apparent distress. After visit summary printed and reviewed with patient.       Medications reviewed and updated on home medication list -Yes    Influenza vaccine:     [] given    [x] declined   [x] received previously   [] plans to receive at a later time   [] refused    [x] documented in [address]: MED RECONCILIATION/REVIEW [address]: No  Total # of Interventions Recommended: 0  - Maintenance

## 2020-05-08 NOTE — TELEPHONE ENCOUNTER
Notified patient's wife that the RIGHT hearing aid came back from repair at that it can be picked up at ENT office. Placed hearing aid at ENT office today. No charge.

## 2020-05-11 NOTE — TELEPHONE ENCOUNTER
Date of last visit:  5/5/2020  Date of next visit:  8/11/2020    Requested Prescriptions     Pending Prescriptions Disp Refills    Clarisa Sosa strip [Pharmacy Med Name: ONE TOUCH VERIO TEST STRIP] 300 strip 3     Sig: TEST BLOOD SUGAR 3 TIMES A DAY E11.9

## 2020-05-26 NOTE — PROGRESS NOTES
Medication Management [address]  [phone] (phone)  [phone] (fax)      Anticoagulation encounter completed via telephone. Patient had lab result completed at outside lab. Mr. Ayo Pagan is a 80 y.o.  male with history of Afib/PE. Patient verifies current dosing regimen and tablet strength. No missed or extra doses. Patient denies s/s bleeding/bruising/swelling/SOB/chest pain  No blood in urine or stool. No dietary changes. No changes in medication/OTC agents/Herbals. No change in alcohol use or tobacco use. No change in activity level. Patient denies headaches/dizziness/lightheadedness/falls. No vomiting/diarrhea or acute illness. No Procedures scheduled in the future at this time. Assessment:   Lab Results   Component Value Date    INR 2.28 (H) 05/26/2020    INR 2.17 (H) 04/21/2020    INR 2.40 (H) 03/18/2020     INR therapeutic   Recent Labs     05/26/20  0955   INR 2.28*     Plan:  Continue Coumadin 3 mg MF and 6 mg [id number]. Recheck INR in 5 weeks. Order for next INR faxed to dialysis. Patient reminded to call the Anticoagulation Clinic with any signs or symptoms of bleeding or with any medication changes. Patient given instructions utilizing the teach back method. The following statement was review with patient regarding this virtual visit:  We want to confirm that, for purposes of billing, this is a virtual visit with your provider for which we will submit a claim for reimbursement with your insurance company. You may be responsible for any copays, coinsurance amounts or other amounts not covered by your insurance company. If you do not accept this, unfortunately we will not be able to schedule a virtual visit with the provider. Do you accept? Yes    Verbal Consent for Consult Agreement participation during COVID-19 Pandemic  Verbiage for CPA Consent:  Discussed with patient the Pharmacist Collaborative Practice Agreement.   Patient provided verbal and/or electronic (ex. mychart) consent to be managed by a pharmacist per collaborative practice agreement between the pharmacist and referring physician. This is in lieu of paper consent due to COVID-19 precautions and the use of remote/virtual visits.      Chepe Garvin PharmD, BCPS  5/26/2020 10:49 AM    CLINICAL PHARMACY CONSULT: MED RECONCILIATION/REVIEW ADDENDUM    For Pharmacy Admin Tracking Only    PHSO: No  Total # of Interventions Recommended: 0  - Maintenance Safety Lab Monitoring #: 1  Total Interventions Accepted: 0  Time Spent (min): 15

## 2020-06-26 NOTE — PROGRESS NOTES
 Obstructive sleep apnea     On home oxygen therapy 2013    KELSEY treated with BiPAP     Osteoarthritis     Pacemaker     [place] dual pacer    Prostate enlargement        Past Surgical History:   Procedure Laterality Date    AORTIC VALVE REPLACEMENT  2/3/2010    tissue valve    CARDIAC CATHETERIZATION  1 20 2010    Severe AS. Mild MV stenosis. Normal coronary arteries. Normal LV systolic function. EF 70%. Moderate concentric LVH. SPAP 47/20.  CARDIAC CATHETERIZATION  8 12 2009    Transvenous dual chamber permanent pacemaer implantation.  CARDIAC CATHETERIZATION  11 16 2007    Normal coronary arterties. EF 65%. LVH. Mild to moderate AS w/ AV area of 1.47 squared. SPAP 45/19    CARDIOVASCULAR STRESS TEST  7 15 2009    Sinus rhythm w/ average heart rate of 60 bpm. Borderline to mild GA interval prolongation throughout majority of recording. Increased QRS duration compatible w/ right BBB. Intermittent episodes of Mobitz type 2 secondary AV block, manifested as non-conducted sinus impulses which were not premature. Intermittent episodes of high-grade AV block terminated by an idioventricular escape rhythm, 30bpm.    CARDIOVASCULAR STRESS TEST  11 09 2007    Normal stress test. HTN. DM type 2.     COLONOSCOPY      DIALYSIS FISTULA CREATION  07/27/11    Rt Wrist    ENDOSCOPY, COLON, DIAGNOSTIC      EYE SURGERY      OTHER SURGICAL HISTORY  09/13/2016    FIBEROPTIC BRONCHOSCOPY    PACEMAKER PLACEMENT      GA COLONOSCOPY FLX DX W/COLLJ SPEC WHEN PFRMD N/A 9/7/2018    COLONOSCOPY performed by Jose Luis [facility]ilar [facility] at [facility]    GA EGD TRANSORAL BIOPSY SINGLE/MULTIPLE N/A 9/7/2018    EGD performed by Jose Luis Aguilar MD at [address] [place] [address] Blvd  6yrs old   [address] ECHOCARDIOGRAM  12 09 2009    Severely dilated LV w/ moderate LVH. EF 55-56.4%. Mildly dilated RV w/ normal systolic function.  Grade 2 diastolic dysfunction w/ severely elevated LA tablet 1    metoprolol tartrate (LOPRESSOR) 25 MG tablet Take 12.5 mg by mouth See Admin Instructions Sunday, Monday, Wednesday and Friday (non-dialysis days only)      insulin aspart (NOVOLOG FLEXPEN) 100 UNIT/ML injection pen 2-16 units 3 times a day as per scale 45 pen 3    Spacer/Aero-Holding Chambers ZOHREH 1 Device by Does not apply route 2 times daily With Symbicort inhaler 1 Device 0    SALINE NASAL SPRAY NA 1 spray by Nasal route as needed Indications: Dry Nose       budesonide-formoterol (SYMBICORT) 80-4.5 MCG/ACT AERO Inhale 2 puffs into the lungs 2 times daily Rinse mouth after its use. 1 Inhaler 11    isosorbide mononitrate (IMDUR) 30 MG extended release tablet Take 1 tablet by mouth daily Indications: Treatment to Prevent Angina 90 tablet 1    warfarin (COUMADIN) 3 MG tablet Take as directed by [facility]. 200 tablets for 90 days 200 tablet 3    midodrine (PROAMATINE) 5 MG tablet 5 mg 3 times daily (with meals) With 10 mg to total 15 mg TID  4    ammonium lactate (AMLACTIN) 12 % cream 2 times daily as needed for Dry Skin To feet  11    Insulin Pen Needle (B-D UF III MINI PEN NEEDLES) 31G X 5 MM MISC 1 each by Does not apply route 4 times daily 300 each 1    aspirin 81 MG chewable tablet Take 1 tablet by mouth daily 100 tablet 1    timolol (TIMOPTIC) 0.5 % ophthalmic solution Place 1 drop into both eyes 2 times daily Indications: Disease of the Eye 15 mL 1    OXYGEN by Nasal route See Admin Instructions Indications: Oxygen Therapy       No current facility-administered medications for this visit. Family History   Problem Relation Age of Onset    Diabetes Father     Diabetes Sister     Diabetes Brother           Review of Systems   General/Constitutional: No recent loss of weight or appetite changes. No fever or chills. HENT: Negative. Eyes: Negative. Upper respiratory tract: No nasal stuffiness or post nasal drip.   Lower respiratory tract/ lungs: No cough or advised to call DME company regarding supplies if needed.   -He is to call my office for earlier appointment if needed for worsening of sleep symptoms.   - He was instructed on weight loss. - Freddy Biswas was educated about my impression and plan and verbalizes understanding. We will see Rob Gilbert back in: 1 year with download.       Electronically signed by NIC Morgan CNP on 6/26/2020 at 10:32 AM

## 2020-06-30 NOTE — PROGRESS NOTES
[facility] [address]   [phone] (phone)  [phone] (fax)      Anticoagulation encounter completed via telephone. Patient had lab result completed at outside lab. Spoke with patient's wife Margarita Pemberton. Mr. Tyler Martin is a 80 y.o.  male with history of atrial fibrillation, PE. Patient's wife Margarita Pemberton verifies current dosing regimen and tablet strength. No missed or extra doses. Patient denies s/s bleeding/bruising/swelling/chest pain. Baseline SOB that is not unusual.  No blood in urine or stool. No dietary changes. No changes in medication/OTC agents/Herbals. No change in alcohol use or tobacco use. No change in activity level. Patient denies headaches/dizziness/lightheadedness/falls. No vomiting/diarrhea or acute illness. No Procedures scheduled in the future at this time. Assessment:   Lab Results   Component Value Date    INR 1.98 (H) 06/30/2020    INR 2.28 (H) 05/26/2020    INR 2.17 (H) 04/21/2020     INR subtherapeutic   Recent Labs     06/30/20  1006   INR 1.98*     Patient is slightly subtherapeutic today. Previously, patient had four consecutive therapeutic INRs while on current regimen. Plan:  Coumadin 7.5 mg x 1 dose today 6/30/20 then continue Coumadin 3 mg MF and 6 mg [id number]. Recheck INR in 5 weeks at dialysis- order faxed to [employer]. Patient's wife Margarita Pemberton reminded to call the Anticoagulation Clinic with any signs or symptoms of bleeding or with any medication changes. Patient's wife Margarita Pemberton given instructions utilizing the teach back method. Najma Fitch PharmD, BCPS  6/30/2020  12:59 PM    The following statement was review with patient regarding this virtual visit:  We want to confirm that, for purposes of billing, this is a virtual visit with your provider for which we will submit a claim for reimbursement with your insurance company.  You may be responsible for any copays, coinsurance amounts or other amounts not covered by your insurance company. If you do not accept this, unfortunately we will not be able to schedule a virtual visit with the provider. Do you accept?   Yes    CLINICAL PHARMACY CONSULT: MED RECONCILIATION/REVIEW ADDENDUM    For Pharmacy Admin Tracking Only    PHSO: No  Total # of Interventions Recommended: 1  - Increased Dose #: 1  - Maintenance Safety Lab Monitoring #: 1  Total Interventions Accepted: 1  Time Spent (min): 2297  [facility] [address] [address], PharmD

## 2020-07-29 NOTE — PROGRESS NOTES
[facility]    Patient: Jordin Connolly, 80 y.o.   : 1934    Pt of Dr. Francesca Myers   Patient presents with    Follow-up     COPD 6 month  with no testing        HPI      Nyla Rosario is with his wife for a follow up on COPD. Patient is currently on Low dose Symbicort 80/4.5 mcg BID and rinsing his mouth out after on a regular schedule. Patient does not use any other inhalers   Patient reports he still get short of breath with exertion however the degree of shortness of breath is less now that he is on Symbicort and using regularly. Patient also uses BiPAP and follow in sleep clinic     Patient denies cough, congestion, fever, chill or sick contact    Patient is a former smoker but does not currently smoke     Progress History:   Since last visit any new medical issues? No  New ER or hospital visits? No  Any new or changes in medicines? No  Using inhalers? Yes Symbicort   Are they helpful?  Yes     Past Medical hx   PMH:  Past Medical History:   Diagnosis Date    Acute on chronic combined systolic and diastolic congestive heart failure (HCC)     Aortic stenosis     Atrial fibrillation (HCC) 2017    Atrial flutter ([facility] 2017    COPD (chronic obstructive pulmonary disease) (HCC)     Coronary artery disease     DM (diabetes mellitus), type 2 with renal complications ([facility]     DVT (deep venous thrombosis) ([facility]     Hemodialysis patient ([facility] 10/22/2016    [facility]    Hyperlipidemia     Hypertension     Morbid obesity with BMI of 50.0-59.9, adult ([facility]     Obstructive sleep apnea     On home oxygen therapy     KELSEY treated with BiPAP     Osteoarthritis     Pacemaker     [place] dual pacer    Prostate enlargement      SURGICAL HISTORY:  Past Surgical History:   Procedure Laterality Date    AORTIC VALVE REPLACEMENT  2/3/2010    tissue valve    CARDIAC CATHETERIZATION  2010 Severe AS. Mild MV stenosis. Normal coronary arteries. Normal LV systolic function. EF 70%. Moderate concentric LVH. SPAP 47/20.  CARDIAC CATHETERIZATION  8 12 2009    Transvenous dual chamber permanent pacemaer implantation.  CARDIAC CATHETERIZATION  11 16 2007    Normal coronary arterties. EF 65%. LVH. Mild to moderate AS w/ AV area of 1.47 squared. SPAP 45/19    CARDIOVASCULAR STRESS TEST  7 15 2009    Sinus rhythm w/ average heart rate of 60 bpm. Borderline to mild MS interval prolongation throughout majority of recording. Increased QRS duration compatible w/ right BBB. Intermittent episodes of Mobitz type 2 secondary AV block, manifested as non-conducted sinus impulses which were not premature. Intermittent episodes of high-grade AV block terminated by an idioventricular escape rhythm, 30bpm.    CARDIOVASCULAR STRESS TEST  11 09 2007    Normal stress test. HTN. DM type 2.     COLONOSCOPY      DIALYSIS FISTULA CREATION  07/27/11    Rt Wrist    ENDOSCOPY, COLON, DIAGNOSTIC      EYE SURGERY      OTHER SURGICAL HISTORY  09/13/2016    FIBEROPTIC BRONCHOSCOPY    PACEMAKER PLACEMENT      MS COLONOSCOPY FLX DX W/COLLJ SPEC WHEN PFRMD N/A 9/7/2018    COLONOSCOPY performed [facility]    MS EGD TRANSORAL BIOPSY SINGLE/MULTIPLE N/A 9/7/2018    EGD performed by Melony Lee MD at [address]  6yrs old   [address] ECHOCARDIOGRAM  12 09 2009    Severely dilated LV w/ moderate LVH. EF 55-56.4%. Mildly dilated RV w/ normal systolic function. Grade 2 diastolic dysfunction w/ severely elevated LA pressure. Severe calcific AS. No AI. Mild MV stenosis w/ mild to moderate MR. Mild TR w/ mildly elevated RVSP, 40-45mmHg. Moderately dilated La dn RA.  TRANSTHORACIC ECHOCARDIOGRAM  11 09 2007    Technically limited d/t body habitus. Preserved systolic function of LV w/ diffuse LVH. EF 50-55%.  Moderate to moderately (non-dialysis days only)      insulin aspart (NOVOLOG FLEXPEN) 100 UNIT/ML injection pen 2-16 units 3 times a day as per scale 45 pen 3    Spacer/Aero-Holding Chambers ZOHREH 1 Device by Does not apply route 2 times daily With Symbicort inhaler 1 Device 0    SALINE NASAL SPRAY NA 1 spray by Nasal route as needed Indications: Dry Nose       budesonide-formoterol (SYMBICORT) 80-4.5 MCG/ACT AERO Inhale 2 puffs into the lungs 2 times daily Rinse mouth after its use. 1 Inhaler 11    isosorbide mononitrate (IMDUR) 30 MG extended release tablet Take 1 tablet by mouth daily Indications: Treatment to Prevent Angina 90 tablet 1    warfarin (COUMADIN) 3 MG tablet Take as directed by [facility]. 200 tablets for 90 days 200 tablet 3    midodrine (PROAMATINE) 5 MG tablet 5 mg 3 times daily (with meals) With 10 mg to total 15 mg TID  4    ammonium lactate (AMLACTIN) 12 % cream 2 times daily as needed for Dry Skin To feet  11    Insulin Pen Needle (B-D UF III MINI PEN NEEDLES) 31G X 5 MM MISC 1 each by Does not apply route 4 times daily 300 each 1    aspirin 81 MG chewable tablet Take 1 tablet by mouth daily 100 tablet 1    timolol (TIMOPTIC) 0.5 % ophthalmic solution Place 1 drop into both eyes 2 times daily Indications: Disease of the Eye 15 mL 1    OXYGEN by Nasal route See Admin Instructions Indications: Oxygen Therapy       No current facility-administered medications for this visit. Marry WILLIS   Review of Systems   Constitutional: Negative for chills, fever and unexpected weight change. HENT: Negative for congestion, sinus pain and sore throat. Eyes: Negative for visual disturbance. Respiratory: Positive for shortness of breath. Negative for cough, chest tightness and wheezing. Cardiovascular: Positive for leg swelling. Negative for chest pain. Gastrointestinal: Negative for abdominal pain, constipation and diarrhea.    Genitourinary:        Dialysis 3 days a week    Musculoskeletal: Negative for joint swelling and myalgias. Neurological: Negative for dizziness and headaches. Physical exam   /78 (Site: Left Lower Arm, Position: Sitting, Cuff Size: Medium Adult)   Pulse 69   Temp 98 °F (36.7 °C)   Ht 5' 10\" (1.778 m)   SpO2 97% Comment: on room air at rest  BMI 49.93 kg/m²    Wt Readings from Last 3 Encounters:   06/26/20 (!) 348 lb (157.9 kg)   03/09/20 (!) 346 lb 12.8 oz (157.3 kg)   02/11/20 (!) 342 lb (155.1 kg)       Physical Exam  Constitutional:       Appearance: He is obese. He is not ill-appearing or diaphoretic. HENT:      Head: Normocephalic and atraumatic. Right Ear: External ear normal.      Left Ear: External ear normal.      Nose: Nose normal.      Comments: Bridge of nose has scab from BiPAP mask      Mouth/Throat:      Mouth: Mucous membranes are moist.      Pharynx: Oropharynx is clear. Eyes:      Pupils: Pupils are equal, round, and reactive to light. Neck:      Musculoskeletal: Normal range of motion and neck supple. Cardiovascular:      Rate and Rhythm: Normal rate. Pulses: Normal pulses. Heart sounds: Normal heart sounds. Comments: Pacemaker   Pulmonary:      Effort: Pulmonary effort is normal.      Breath sounds: Decreased air movement present. Examination of the right-lower field reveals decreased breath sounds. Examination of the left-lower field reveals decreased breath sounds. Decreased breath sounds present. Abdominal:      General: Bowel sounds are normal. There is no distension. Palpations: Abdomen is soft. Musculoskeletal:      Right lower leg: Edema present. Left lower leg: Edema present. Skin:     General: Skin is warm and dry. Neurological:      General: No focal deficit present. Mental Status: He is alert and oriented to person, place, and time.    Psychiatric:         Mood and Affect: Mood normal.         Behavior: Behavior normal.       Results   Lung Nodule Screening     [] Qualifies    [x]

## 2020-08-03 NOTE — TELEPHONE ENCOUNTER
Date of last visit:  5/5/2020  Date of next visit:  8/11/2020    Requested Prescriptions     Pending Prescriptions Disp Refills    insulin glargine (LANTUS SOLOSTAR) 100 UNIT/ML injection pen 5 pen 1     Sig: Inject 50 Units into the skin nightly

## 2020-08-04 NOTE — PROGRESS NOTES
[facility]ication Management [address]  [phone] (phone)  [phone] (fax)      Anticoagulation encounter completed via telephone. Patient had lab result completed at outside lab. Mr. Kanchan Hernandez is a 80 y.o.  male with history of PE, Afib. Patient verifies current dosing regimen and tablet strength. No missed or extra doses. Patient denies s/s bleeding/bruising/swelling/SOB/chest pain  No blood in urine or stool. No dietary changes. No changes in medication/OTC agents/Herbals. No change in alcohol use or tobacco use. No change in activity level. Patient denies headaches/dizziness/lightheadedness/falls. No vomiting/diarrhea or acute illness. No Procedures scheduled in the future at this time. Assessment:   Lab Results   Component Value Date    INR 2.62 08/04/2020    INR 1.98 (H) 06/30/2020    INR 2.28 (H) 05/26/2020     INR therapeutic   Recent Labs     08/04/20   INR 2.62         Plan:  Continue Coumadin 3mg MF and 6mg TWThSaS. Recheck INR in 6 weeks. Will return to in clinic visits. Patient reminded to call the Anticoagulation Clinic with any signs or symptoms of bleeding or with any medication changes. Patient given instructions utilizing the teach back method. The following statement was review with patient regarding this virtual visit:  We want to confirm that, for purposes of billing, this is a virtual visit with your provider for which we will submit a claim for reimbursement with your insurance company. You may be responsible for any copays, coinsurance amounts or other amounts not covered by your insurance company. If you do not accept this, unfortunately we will not be able to schedule a virtual visit with the provider. Do you accept?   Yes    CLINICAL PHARMACY CONSULT: MED RECONCILIATION/REVIEW ADDENDUM    For Pharmacy Admin Tracking Only    PHSO: No  Total # of Interventions Recommended: 0  - Maintenance Safety Lab Monitoring #: 1  Total Interventions Accepted: 0  Time Spent (min): 15    Sheng Chavez, SophieD

## 2020-08-06 NOTE — TELEPHONE ENCOUNTER
The pharmacy is  requesting a refill of the below medication which has been pended for you:     Requested Prescriptions     Pending Prescriptions Disp Refills    isosorbide mononitrate (IMDUR) 30 MG extended release tablet 90 tablet 1     Sig: Take 1 tablet by mouth daily Indications: Treatment to Prevent Angina       Last Appointment Date: 5/5/2020  Next Appointment Date: 8/11/2020    No Known Allergies

## 2020-08-11 NOTE — PROGRESS NOTES
Visit Date: 8/11/2020     Sheryle Snell is a 80 y.o. male who presents today for:  Chief Complaint   Patient presents with    Check-Up    Diabetes         HPI:     HPI    Patient is here for checkup, she just had a hemodialysis today and feeling well, no shortness of breath or chest pain.     He had a fall recently as he missed the chair, patient has been mostly wheelchair bound being pushed by the wife, and family members, he had pain on the hip , back pain and inability to balance himself    No specific complaint at this time    Current Medications:  Current Outpatient Medications   Medication Sig Dispense Refill    midodrine (PROAMATINE) 10 MG tablet Take 1 tablet by mouth 3 times daily (with meals) Take with 5 mg to total 15 mg 270 tablet 1    isosorbide mononitrate (IMDUR) 30 MG extended release tablet Take 1 tablet by mouth daily Indications: Treatment to Prevent Angina 90 tablet 1    insulin glargine (LANTUS SOLOSTAR) 100 UNIT/ML injection pen Inject 50 Units into the skin nightly 15 pen 1    insulin aspart (NOVOLOG FLEXPEN) 100 UNIT/ML injection pen 2-16 units 3 times a day as per scale 45 pen 3    metoprolol tartrate (LOPRESSOR) 25 MG tablet Take 0.5 tablets by mouth See Admin Instructions Sunday, Monday, Wednesday and Friday (non-dialysis days only) 60 tablet 1    B Complex-C-Folic Acid (ALBAN CAPS) 1 MG CAPS TAKE 1 CAPSULE BY MOUTH EVERY DAY 90 capsule 0    ONETOUCH VERIO strip TEST BLOOD SUGAR 3 TIMES A DAY E11.9 300 strip 3    atorvastatin (LIPITOR) 40 MG tablet Take 1 tablet by mouth daily 90 tablet 1    fludrocortisone (FLORINEF) 0.1 MG tablet Take 1 tablet by mouth daily Indications: Blood Pressure Drop Upon Standing 90 tablet 1    tamsulosin (FLOMAX) 0.4 MG capsule Take 1 capsule by mouth daily 90 capsule 1    fluticasone (FLONASE) 50 MCG/ACT nasal spray SPRAY 2 SPRAYS INTO EACH NOSTRIL EVERY DAY 1 Bottle 5    acetaminophen (TYLENOL) 325 MG tablet Take 2 tablets by mouth every 4 hours Indications: Pain 120 tablet 3    Ferric Citrate (AURYXIA) 1  MG(Fe) TABS Take 1 tablet by mouth 3 times daily 270 tablet 1    Spacer/Aero-Holding Chambers ZOHREH 1 Device by Does not apply route 2 times daily With Symbicort inhaler 1 Device 0    SALINE NASAL SPRAY NA 1 spray by Nasal route as needed Indications: Dry Nose       budesonide-formoterol (SYMBICORT) 80-4.5 MCG/ACT AERO Inhale 2 puffs into the lungs 2 times daily Rinse mouth after its use. 1 Inhaler 11    warfarin (COUMADIN) 3 MG tablet Take as directed by [facility]. 200 tablets for 90 days 200 tablet 3    midodrine (PROAMATINE) 5 MG tablet 5 mg 3 times daily (with meals) With 10 mg to total 15 mg TID  4    ammonium lactate (AMLACTIN) 12 % cream 2 times daily as needed for Dry Skin To feet  11    Insulin Pen Needle (B-D UF III MINI PEN NEEDLES) 31G X 5 MM MISC 1 each by Does not apply route 4 times daily 300 each 1    aspirin 81 MG chewable tablet Take 1 tablet by mouth daily 100 tablet 1    timolol (TIMOPTIC) 0.5 % ophthalmic solution Place 1 drop into both eyes 2 times daily Indications: Disease of the Eye 15 mL 1    OXYGEN by Nasal route See Admin Instructions Indications: Oxygen Therapy       No current facility-administered medications for this visit. Subjective:     Review of Systems   Constitutional: Negative for appetite change, chills, diaphoresis, fatigue and fever. HENT: Negative for congestion, ear discharge, ear pain, postnasal drip, rhinorrhea, sinus pressure, sore throat, trouble swallowing and voice change. Respiratory: Negative for cough, chest tightness, shortness of breath and wheezing. Cardiovascular: Negative for chest pain, palpitations and leg swelling. Gastrointestinal: Negative for abdominal pain, constipation, diarrhea, nausea and vomiting. Musculoskeletal: Positive for arthralgias and gait problem. Negative for back pain, joint swelling, myalgias, neck pain and neck stiffness. Skin: Negative for rash. Neurological: Negative for dizziness, syncope, weakness, light-headedness, numbness and headaches. Objective:     BP 92/60 (Site: Left Upper Arm, Position: Sitting, Cuff Size: Large Adult)   Pulse 72   Temp 97 °F (36.1 °C) (Skin)   Resp 16   Ht 5' 10\" (1.778 m)   Wt (!) 342 lb (155.1 kg)   BMI 49.07 kg/m²   BP Readings from Last 3 Encounters:   08/11/20 92/60   07/29/20 122/78   06/26/20 118/76     Wt Readings from Last 3 Encounters:   08/11/20 (!) 342 lb (155.1 kg)   06/26/20 (!) 348 lb (157.9 kg)   03/09/20 (!) 346 lb 12.8 oz (157.3 kg)        Physical Exam  Vitals signs reviewed. Constitutional:       Appearance: He is well-developed. HENT:      Head: Normocephalic and atraumatic. Right Ear: External ear normal.      Left Ear: External ear normal.      Nose: Nose normal.   Eyes:      General: No scleral icterus. Conjunctiva/sclera: Conjunctivae normal.      Pupils: Pupils are equal, round, and reactive to light. Neck:      Musculoskeletal: Normal range of motion and neck supple. Thyroid: No thyromegaly. Vascular: No JVD. Cardiovascular:      Rate and Rhythm: Normal rate and regular rhythm. Heart sounds: No murmur. No friction rub. Pulmonary:      Effort: Pulmonary effort is normal.      Breath sounds: Normal breath sounds. No wheezing or rales. Chest:      Chest wall: No tenderness. Abdominal:      General: Bowel sounds are normal.      Palpations: Abdomen is soft. There is no mass. Tenderness: There is no abdominal tenderness. Musculoskeletal:      Comments: Fistula on the right forearms   Lymphadenopathy:      Cervical: No cervical adenopathy. Skin:     Findings: No rash. Neurological:      Mental Status: He is alert and oriented to person, place, and time. Psychiatric:         Behavior: Behavior is cooperative. Assessment:       Diagnosis Orders   1.  Type 2 diabetes mellitus with stage 4 chronic kidney disease, with long-term current use of insulin ([facility]     2. Chronic obstructive pulmonary disease, unspecified COPD type ([facility]  DME Order for Abbey  as OP   3. ESRD (end stage renal disease) ([facility]  DME Order for Abbey  as OP   4. Chronic respiratory failure with hypoxia and hypercapnia ([facility]  DME Order for Abbey  as OP   5. Essential hypertension     6. Primary osteoarthritis involving multiple joints  DME Order for Walker as OP   7. Impaired mobility and activities of daily living         Plan:      Medications Prescribed:  Orders Placed This Encounter   Medications    midodrine (PROAMATINE) 10 MG tablet     Sig: Take 1 tablet by mouth 3 times daily (with meals) Take with 5 mg to total 15 mg     Dispense:  270 tablet     Refill:  1    isosorbide mononitrate (IMDUR) 30 MG extended release tablet     Sig: Take 1 tablet by mouth daily Indications: Treatment to Prevent Angina     Dispense:  90 tablet     Refill:  1    insulin glargine (LANTUS SOLOSTAR) 100 UNIT/ML injection pen     Sig: Inject 50 Units into the skin nightly     Dispense:  15 pen     Refill:  1    insulin aspart (NOVOLOG FLEXPEN) 100 UNIT/ML injection pen     Si-16 units 3 times a day as per scale     Dispense:  45 pen     Refill:  3    metoprolol tartrate (LOPRESSOR) 25 MG tablet     Sig: Take 0.5 tablets by mouth See Admin Instructions , Monday, Wednesday and Friday (non-dialysis days only)     Dispense:  60 tablet     Refill:  1       Orders Placed:  Orders Placed This Encounter   Procedures    DME Order for Walker as OP     You must complete the order parameters below and add the medical necessity documentation for this DME in a separate note.     Folding Walker with Wheels and Seat    Current patient weight: Weight: (!) 342 lb (155.1 kg)  Current patient height: Height: 5' 10\" (177.8 cm)  Diagnosis: Osteoarthritis, ESRD, impaired mobility, gait and balance, COPD  Duration: Purchase     Lab Results   Component Value Date    LABA1C 7.8 05/21/2020    LABA1C 8.3 (A) 05/05/2020    LABA1C 7.6 (A) 01/28/2020     Lab Results   Component Value Date    LDLCALC 151 07/09/2018    CREATININE 9.58 07/16/2020       Continue to monitor blood sugars 3  times a day. Return in about 3 months (around 11/11/2020). Discussed use, benefit, and side effectsof prescribed medications. All patient questions answered. Pt voiced understanding. Instructed to continue current medications, diet and exercise. Patient agreedwith treatment plan. Abbey Mcintosh with seat and wheels    Rg Reagan was evaluated today and a DME order was entered for a wheeled walker with seat because he requires this to successfully complete daily living tasks of eating, bathing, toileting, personal cares, ambulating, grooming, hygiene and meal preparation. A wheeled walker with seat is necessary due to the patient's unsteady gait, upper body weakness, inability to  and ambulation device, ambulating only short distances by pushing a walker, and the need to sit for a short time before resuming ambulation. These tasks cannot be completed with a lesser ambulation device such as a cane, crutch, or standard walker. The need for this equipment was discussed with the patient and he understands and is in agreement.         Kassi Walls MD

## 2020-09-04 NOTE — TELEPHONE ENCOUNTER
Date of last visit:  8/11/2020  Date of next visit:  11/17/2020    Requested Prescriptions     Pending Prescriptions Disp Refills    metoprolol tartrate (LOPRESSOR) 25 MG tablet [Pharmacy Med Name: METOPROLOL TARTRATE 25 MG TAB] 60 tablet 1     Sig: PLEASE SEE ATTACHED FOR DETAILED DIRECTIONS

## 2020-09-16 NOTE — PROGRESS NOTES
[facility]ication [facility] [address]  [phone] (phone)  [phone] (fax)      Mr. Jenni Gao is a 80 y.o.  male with history of PE/paroxysmal atrial fib. , per Dr. Elías Chairez referral, who presents today for Warfarin monitoring and adjustment (6 week visit). Wife verifies current dosing regimen and tablet strength. No missed or extra doses. Patient denies bruising/chest pain. Had a nosebleed the other day after sneezing - applied ice. Has usual SOB/swelling of legs. No blood in urine or stool. States stool is sometimes black from iron. No dietary changes. No changes in medication/OTC agents/herbals. No change in alcohol use or tobacco use. No change in activity level. Patient denies headaches/dizziness/lightheadedness. [place] recently onto buttocks - caught house slipper and missed chair. No vomiting/diarrhea or acute illness. No procedures scheduled in the future at this time. States has 2 broken teeth - may need cut out; reminded to call this clinic if scheduled. Assessment:   Lab Results   Component Value Date    INR 2.80 (H) 09/16/2020    INR 2.62 08/04/2020    INR 1.98 (H) 06/30/2020     INR therapeutic - goal 2-3. Recent Labs     09/16/20  1104   INR 2.80*     Plan:  POCT INR ordered/performed/result reviewed. Continue PO Coumadin 3 mg MF, 6 mg TWThSS. Recheck INR in 6 week(s). Patient reminded to call the Anticoagulation Clinic with any signs or symptoms of bleeding or with any medication changes. Patient given instructions utilizing the teach back method. Discharged via transport chair in no apparent distress, wearing mask and oxygen, with wife. After visit summary printed and reviewed with patient.       Medications reviewed and updated on home medication list.    Influenza vaccine:     [] given    [] declined   [] received previously   [] plans to receive at a later time   [] refused    [] documented in Epic

## 2020-10-06 NOTE — PROGRESS NOTES
DR Vero Lion PT   ST THUY DUAL PACEMAKER PROGRAMMED VVIR 70  BATTEY 11-11.8 YRS REMAINING    VENT IMPEDENCE 510  RV WAVES 7.3  VENT THRESHOLD PER THE DEVICE 0.625 @ 0.5  VENT AMPLITUDE PROGRAMMED AUTO   VVIR 70  V PACED >99%  PT HAD 1 EPISODE OF 3 BTS NS VT AND THEN WENT BACK IN TO SINUS AND THEN HAD ANOTHER 8 BTS OF NS VT ON 8/18/2020

## 2020-10-28 NOTE — PROGRESS NOTES
[facility]ication [facility] [address]  [phone] (phone)  [phone] (fax)      Mr. Judy Carrera is a 80 y.o.  male with history of PE, Afib who presents today for anticoagulation monitoring and adjustment. Patient verifies current dosing regimen and tablet strength. No missed or extra doses. Patient denies s/s bleeding/bruising/swelling/SOB/chest pain  No blood in urine or stool. No dietary changes. No changes in medication/OTC agents/Herbals. No change in alcohol use or tobacco use. Patient reports decrease in activity since last visit. Patient denies headaches/dizziness/lightheadedness/falls. No vomiting/diarrhea or acute illness. No Procedures scheduled in the future at this time. Patient interview completed and discussed with pharmacist by Ellie Dominguez PharmD candidate. At end of appointment, patient reports that he has some blistering (some blisters have blood around them) on his right lower leg that has been there since Saturday. Patient reports he will call his PCP today after leaving [facility] to discuss this and will try to get an appointment in the next couple of days. Patient states he will call us to inform us if he has any additional bleeding/bruising or any medication changes. Assessment:   Lab Results   Component Value Date    INR 2.00 (H) 10/28/2020    INR 2.80 (H) 09/16/2020    INR 2.62 08/04/2020     INR therapeutic   Recent Labs     10/28/20  1123   INR 2.00*       Plan:  Continue Coumadin 3 mg MF and 6 mg [id number]. Recheck INR in 6 week(s). Patient reminded to call the Anticoagulation Clinic with any signs or symptoms of bleeding or with any medication changes. Patient given instructions utilizing the teach back method. Discharged in wheelchair with wife in no apparent distress. After visit summary printed and reviewed with patient.       Medications reviewed and updated on home medication list

## 2020-11-17 NOTE — TELEPHONE ENCOUNTER
Date of last visit:  8/11/2020  Date of next visit:  11/19/2020    Requested Prescriptions     Signed Prescriptions Disp Refills    LANTUS SOLOSTAR 100 UNIT/ML injection pen 5 pen 1     Sig: INJECT 50 UNITS INTO THE SKIN NIGHTLY     Authorizing Provider: Patricia Gallego     Ordering User: Shannan Mistry

## 2020-11-19 PROBLEM — I50.42 CHF NYHA CLASS III, CHRONIC, COMBINED (HCC): Status: RESOLVED | Noted: 2020-01-01 | Resolved: 2020-01-01

## 2020-11-19 PROBLEM — I48.0 PAROXYSMAL ATRIAL FIBRILLATION (HCC): Status: RESOLVED | Noted: 2017-04-03 | Resolved: 2020-01-01

## 2020-11-19 PROBLEM — I95.9 LOW BLOOD PRESSURE: Status: RESOLVED | Noted: 2020-01-01 | Resolved: 2020-01-01

## 2020-11-19 PROBLEM — I48.92 ATRIAL FLUTTER (HCC): Status: RESOLVED | Noted: 2017-01-25 | Resolved: 2020-01-01

## 2020-11-19 NOTE — PROGRESS NOTES
Date: 11/19/2020    :    Ludwin Casey is a 80 y.o.male who presents today for:  Chief Complaint   Patient presents with    Check-Up    Diabetes         HPI:     Patient just had his dialysis today, doing well dialysis 3 times a week    Diabetes   He presents for his follow-up diabetic visit. He has type 1 diabetes mellitus. His disease course has been stable. Pertinent negatives for hypoglycemia include no dizziness or headaches. Pertinent negatives for diabetes include no chest pain, no fatigue, no foot paresthesias, no visual change, no weakness and no weight loss. Symptoms are stable. Hypertension   Pertinent negatives include no chest pain, headaches, neck pain, palpitations or shortness of breath. Hyperlipidemia   Pertinent negatives include no chest pain, myalgias or shortness of breath. CurrentHome Medications:  Current Outpatient Medications   Medication Sig Dispense Refill    LANTUS SOLOSTAR 100 UNIT/ML injection pen INJECT 50 UNITS INTO THE SKIN NIGHTLY 5 pen 1    warfarin (COUMADIN) 3 MG tablet TAKE AS DIRECTED BY [facility].  200 TABLETS FOR 90 DAYS 200 tablet 1    metoprolol tartrate (LOPRESSOR) 25 MG tablet PLEASE SEE ATTACHED FOR DETAILED DIRECTIONS (Patient taking differently: Take 25 mg by mouth Indications: Pt is to take a half tab on non dialysis days ) 60 tablet 1    midodrine (PROAMATINE) 10 MG tablet Take 1 tablet by mouth 3 times daily (with meals) Take with 5 mg to total 15 mg 270 tablet 1    isosorbide mononitrate (IMDUR) 30 MG extended release tablet Take 1 tablet by mouth daily Indications: Treatment to Prevent Angina 90 tablet 1    insulin aspart (NOVOLOG FLEXPEN) 100 UNIT/ML injection pen 2-16 units 3 times a day as per scale 45 pen 3    B Complex-C-Folic Acid (ALBAN CAPS) 1 MG CAPS TAKE 1 CAPSULE BY MOUTH EVERY DAY 90 capsule 0    ONETOUCH VERIO strip TEST BLOOD SUGAR 3 TIMES A DAY E11.9 300 strip 3    atorvastatin (LIPITOR) 40 MG tablet Take 1 tablet by mouth daily 90 tablet 1    fludrocortisone (FLORINEF) 0.1 MG tablet Take 1 tablet by mouth daily Indications: Blood Pressure Drop Upon Standing 90 tablet 1    tamsulosin (FLOMAX) 0.4 MG capsule Take 1 capsule by mouth daily 90 capsule 1    fluticasone (FLONASE) 50 MCG/ACT nasal spray SPRAY 2 SPRAYS INTO EACH NOSTRIL EVERY DAY 1 Bottle 5    acetaminophen (TYLENOL) 325 MG tablet Take 2 tablets by mouth every 4 hours Indications: Pain 120 tablet 3    Ferric Citrate (AURYXIA) 1  MG(Fe) TABS Take 1 tablet by mouth 3 times daily 270 tablet 1    Spacer/Aero-Holding Chambers ZOHREH 1 Device by Does not apply route 2 times daily With Symbicort inhaler 1 Device 0    SALINE NASAL SPRAY NA 1 spray by Nasal route as needed Indications: Dry Nose       budesonide-formoterol (SYMBICORT) 80-4.5 MCG/ACT AERO Inhale 2 puffs into the lungs 2 times daily Rinse mouth after its use. 1 Inhaler 11    midodrine (PROAMATINE) 5 MG tablet 5 mg 3 times daily (with meals) With 10 mg to total 15 mg TID  4    ammonium lactate (AMLACTIN) 12 % cream 2 times daily as needed for Dry Skin To feet  11    Insulin Pen Needle (B-D UF III MINI PEN NEEDLES) 31G X 5 MM MISC 1 each by Does not apply route 4 times daily 300 each 1    aspirin 81 MG chewable tablet Take 1 tablet by mouth daily 100 tablet 1    timolol (TIMOPTIC) 0.5 % ophthalmic solution Place 1 drop into both eyes 2 times daily Indications: Disease of the Eye 15 mL 1    OXYGEN by Nasal route See Admin Instructions Indications: Oxygen Therapy       No current facility-administered medications for this visit. Subjective:      Review of Systems   Constitutional: Negative for appetite change, chills, diaphoresis, fatigue, fever and weight loss. HENT: Negative for congestion, ear discharge, ear pain, postnasal drip, rhinorrhea, sinus pressure, sore throat, trouble swallowing and voice change.     Respiratory: Negative for cough, chest tightness, shortness of breath and wheezing. Cardiovascular: Negative for chest pain, palpitations and leg swelling. Gastrointestinal: Negative for abdominal pain, constipation, diarrhea, nausea and vomiting. Musculoskeletal: Negative for arthralgias, back pain, joint swelling, myalgias, neck pain and neck stiffness. Skin: Negative for rash. Neurological: Negative for dizziness, syncope, weakness, light-headedness, numbness and headaches. Objective:     BP (!) 92/54 (Site: Left Upper Arm, Position: Sitting, Cuff Size: Large Adult)   Pulse 68   Temp 96.6 °F (35.9 °C) (Skin)   Resp 16   Ht 5' 10\" (1.778 m)   Wt (!) 342 lb (155.1 kg)   BMI 49.07 kg/m²   BP Readings from Last 3 Encounters:   11/19/20 (!) 92/54   08/11/20 92/60   07/29/20 122/78     Wt Readings from Last 3 Encounters:   11/19/20 (!) 342 lb (155.1 kg)   08/11/20 (!) 342 lb (155.1 kg)   06/26/20 (!) 348 lb (157.9 kg)       Physical Exam  Vitals signs reviewed. Constitutional:       Appearance: He is well-developed. HENT:      Head: Normocephalic and atraumatic. Right Ear: External ear normal.      Left Ear: External ear normal.      Nose: Nose normal.   Eyes:      General: No scleral icterus. Conjunctiva/sclera: Conjunctivae normal.      Pupils: Pupils are equal, round, and reactive to light. Neck:      Musculoskeletal: Normal range of motion and neck supple. Thyroid: No thyromegaly. Vascular: No JVD. Cardiovascular:      Rate and Rhythm: Normal rate and regular rhythm. Heart sounds: No murmur. No friction rub. Pulmonary:      Effort: Pulmonary effort is normal.      Breath sounds: Normal breath sounds. No wheezing or rales. Chest:      Chest wall: No tenderness. Abdominal:      General: Bowel sounds are normal.      Palpations: Abdomen is soft. There is no mass. Tenderness: There is no abdominal tenderness. Lymphadenopathy:      Cervical: No cervical adenopathy. Skin:     Findings: No rash.    Neurological:      Mental Status: He is alert and oriented to person, place, and time. Psychiatric:         Behavior: Behavior is cooperative. Assessment:         Diagnosis Orders   1. Type 2 diabetes mellitus with stage 4 chronic kidney disease, with long-term current use of insulin ([facility]  POCT Glucose    POCT glycosylated hemoglobin (Hb A1C)   2. ESRD (end stage renal disease) on dialysis ([facility]     3. Anticoagulated on Coumadin     4. Essential hypertension     5. Anemia, chronic disease         :      Medications Prescribed:  No orders of the defined types were placed in this encounter. Orders Placed:  Orders Placed This Encounter   Procedures    POCT Glucose    POCT glycosylated hemoglobin (Hb A1C)       Lab Results   Component Value Date    LABA1C 7.1 (A) 11/19/2020    LABA1C 7.3 08/20/2020    LABA1C 7.8 05/21/2020     Lab Results   Component Value Date    LDLCALC 151 07/09/2018    CREATININE 9.58 07/16/2020       Return in about 3 months (around 2/19/2021) for DM. Discussed use, benefit, and side effects of prescribedmedications. All patient questions answered. Pt voiced understanding. Instructedto continue current medications, diet and exercise. Patient agreed with treatmentplan.

## 2020-11-21 PROBLEM — I46.9 CARDIAC ARREST (HCC): Status: ACTIVE | Noted: 2020-01-01

## 2020-11-21 NOTE — CODE DOCUMENTATION
Paced rhythm on the monitor at 70 with a strong pulse remains at this time.  Doreen Birmingham and Dr. Dayana Gross attempting central line at this time with femoral approach

## 2020-11-21 NOTE — PROCEDURES
Pacer interogation performed. However, download pacer information but due to [facility] transmission upgrade, unable to send downloaded info. Per abbot/leo upgrade will be complete at 11pm.  Will try in the morning.   Dyllan CCT

## 2020-11-21 NOTE — ED NOTES
Noted to have wide rhythm on the monitor. Dr. Celena Caldwell verbal order to give calcium.  Calcium chloride 1mg given by Mike Cárdenas RN  11/21/20 9277

## 2020-11-21 NOTE — FLOWSHEET NOTE
46 Dr Santina Claude to bedside to place dialysis catheter. Pt placed in position. Pt with tachypnea. Order received for diprivan to be started. Hemodialysis at bedside. Dr Santina Claude asked dialysis nurse to wait before starting dialysis through fistula. 1230 Diprivan started at 10 mcg/kg/min. 1300 hemodialysis line placement continues per Dr Santina Claude. Attempts made in left IJ and Left groin but unsuccessful. 26 R SC hemodialysis line placed per Dr Santina Claude. Dr Santina Claude stated he wants dialysis run through dialysis catheter. 1336 Hemodialysis started per dialysis nurse via right subclavian. 1 Family to bedside after updated per Dr Santina Claude. Updated on pt current status. questions answered. Reviewed visiting policy for hospital at this time. Hippa code given to Daughter, Betty Amezquita    830 [facility] medications reviewed with wife. 1542 Pt suctioned per Resp therapist, Mohinder Gross. She reported pt with bilateral arms jerked upward and eye wide open during suctioning. Pt with positive cough. 1548 Pt neuro assessment with no response to painful stimulation x 4 ext. Pupils pinpoint. When touching head pt again jerks bilateral arms up, eyes wide open. No attempt to track. Possible anoxic in nature. 1600 Dr Santina Claude update on about abnormal movements with increase in diprivan, lactic, BNP, INR, troponin. Plan to order keppra, Hold head CT until tomorrow, and ok to interrogate pacer tomorrow. 1700 Pt with increase in jerking, myoclonic in nature. Without stimulation. Dialysis nearly complete. Awaiting keppra from pharmacy. 1730 Pt with incontinent loose stool. Black in color. Skin care completed. [id number] Update to Heath Ojeda and [facility] regarding pt with increased myoclonic jerking. No change in remaining neuro status. Dialysis completed with blood just collected for repeat studies. Orders to be put in.    1910 Report given to THANIA Reese. Chato Palmer to bedside to assess pt for placement of jose.

## 2020-11-21 NOTE — ED PROVIDER NOTES
Central Line    Date/Time: 11/21/2020 10:27 AM  Performed by: Jodi Peña DO  Authorized by: Ewelina Cramer DO     Consent:     Consent obtained:  Emergent situation  Pre-procedure details:     Hand hygiene: Hand hygiene performed prior to insertion      Sterile barrier technique: All elements of maximal sterile technique followed      Skin preparation:  2% chlorhexidine    Skin preparation agent: Skin preparation agent completely dried prior to procedure    Anesthesia (see MAR for exact dosages): Anesthesia method:  None  Procedure details:     Location:  R femoral    Patient position:  Flat    Procedural supplies:  Triple lumen    Catheter size: 7 Fr. Landmarks identified: yes      Ultrasound guidance: yes      Sterile ultrasound techniques: Sterile gel and sterile probe covers were used      Number of attempts:  1    Successful placement: yes    Post-procedure details:     Post-procedure:  Dressing applied and line sutured    Assessment:  Blood return through all ports and free fluid flow    Patient tolerance of procedure: Tolerated well, no immediate complications  Intubation    Date/Time: 11/21/2020 10:31 AM  Performed by: Jodi Peña DO  Authorized by: Ewelina Cramer DO     Consent:     Consent obtained:  Emergent situation  Pre-procedure details:     Patient status:  Unresponsive    Pretreatment medications:  None    Paralytics:  None  Procedure details:     Preoxygenation:  Bag valve mask    CPR in progress: yes      Intubation method:  Oral    Oral intubation technique:  Video-assisted    Laryngoscope blade: Mac 3    Tube size (mm): 7.5.     Tube type:  Cuffed    Number of attempts:  1    Cricoid pressure: no      Tube visualized through cords: yes    Placement assessment:     ETT to lip:  23    Tube secured with:  ETT stevens    Breath sounds:  Equal    Placement verification: chest rise, CXR verification, direct visualization and ETCO2 detector    Post-procedure details: Patient tolerance of procedure:   Tolerated well, no immediate complications         Robin Smith,   Resident  11/21/20 [id number]

## 2020-11-21 NOTE — H&P
CRITICAL CARE PROGRESS NOTE      Patient:  Alanna Castaneda    Unit/Bed:4D-11/011-A  YOB: 1934  MRN: [id number]   PCP: Bridgett Kwon MD  Date of Admission: 11/21/2020  Chief Complaint:-Cardiac arrest    Assessment and Plan:      1. Acute on chronic hypoxic/hypercapnic respiratory failure: Patient wears nasal cannula at baseline at home. Patient intubated in the emergency department. Maintain peak pressure less than 35.  2. Cardiac arrest: 7-minute known code in the emergency department. Unclear how long patient was pulseless prior to arrival to the emergency department. ROSC was obtained. Patient will not undergo TTM due to limited access, Dr. Aaliyah Jefferson did update family on decision   3. Acute hyperkalemia: Potassium noted in the emergency department at 10.1, patient had noted treatment with medications for hyperkalemia. Emergent dialysis has been started. Nephrology consulted. 4. End-stage renal disease on dialysis: Dialysis initiated. Nephrology consulted. 5. Chronic atrial fib: Amiodarone drip initiated in the emergency department. Coumadin held due to concern for pleural hemorrhage. INR noted at 2.0 today. Start heparin drip once INR is less than 1.5  6. Diabetes mellitus: Lantus and sliding scale insulin. Last A1c 11/19 noted at 7.1.  7. Bioprosthetic valve: History of aortic valve replacement. Coumadin held secondary to concern for pleural hemorrhage. Will start heparin drip once INR is less than 1.5. INITIAL H AND P AND ICU COURSE:  Alanna Castaneda is an 35-year-old black male who presented to [facility] on 11/21/2020 with complaints of cardiac arrest.  He has a past medical history of prostate enlargement, pacemaker, hypoxic respiratory failure, obstructive sleep apnea, morbid obesity, hypertension, hyperlipidemia, end-stage renal disease, DVT, diabetes mellitus type 2, CAD, COPD, atrial fibrillation, aortic stenosis, heart failure.   Per report patient was on his Rounded  Extremities:  No clubbing, cyanosis. Nonpitting lower extremity edema, multiple skin tears and blisters  Vasculature: capillary refill > 3 seconds. Doppler dorsalis pedis pulses. Skin:  warm and dry. Psych: Follows no commands, does not withdraw from pain. Spontaneous cough  Lymph:  No supraclavicular adenopathy. Neurologic:  No focal deficit. No seizures. Data: (All radiographs, tracings, PFTs, and imaging are personally viewed and interpreted unless otherwise noted).  Sodium 140, potassium 5.1, chloride 95, CO2 29, BUN 56, 8 creatinine 8.5, anion gap 16.0, glucose 350   WBC 9.7, hemoglobin 10.0, hematocrit 34.6, platelet count 611   Telemetry shows atrial fibrillation   Chest x-ray 11/21/2020 reports widening of the pleural space could represent pleural hemorrhage. (Updated Dr. Sang Rubi).  Echocardiogram 8/22/2018 ports ejection fraction 50%, mild global hypokinesis. Noted bioprosthetic valve at aortic position. Meets Continued ICU Level Care Criteria:    [x] Yes   [] No - Transfer Planned to listed location:  [] HOSPITALIST CONTACTED- DR     Case and plan discussed with Dr. Sang Rubi. Electronically signed by Jesus Pérez.  NIC [place] - CNP  CRITICAL CARE SPECIALIST

## 2020-11-21 NOTE — FLOWSHEET NOTE
11/21/20 1205 11/21/20 1709   Vital Signs   BP (!) 141/51 (!) 110/27   Temp 97 °F (36.1 °C) 98.1 °F (36.7 °C)   Pulse 70 70   Resp 25 20   SpO2 100 % 100 %   Weight (!) 360 lb 14.3 oz (163.7 kg) (!) 354 lb 4.5 oz (160.7 kg)   Post-Hemodialysis Assessment   Post-Treatment Procedures  --  Blood returned;Catheter Capped, clamped with Saline x2 ports   Machine Disinfection Process  --  Acid/Vinegar Clean;Heat Disinfect; Exterior Machine Disinfection   Total Liters Processed (l/min)  --  52.1 l/min   Dialyzer Clearance  --  Lightly streaked   Duration of Treatment (minutes)  --  210 minutes   Hemodialysis Intake (ml)  --  400 ml   Hemodialysis Output (ml)  --  3400 ml   NET Removed (ml)  --  3000 ml   Tolerated Treatment  --  Poor   3. 5HR tx completed with 3L fluid removal tolerated fair. CVC dressing clean dry and intact upon leaving pt room. Report given to primary nurse. tx to be scanned into EMR.

## 2020-11-21 NOTE — PROCEDURES
PROCEDURE: Right subclavian 20 cm dialysis catheter placement     INDICATION: Emergent hemodialysis    CONTRAINDICATIONS: None    PROCEDURE : Donald Carlin MD      CONSENT: Consent obtained from patient family      A time-out was completed verifying correct patient, procedure, site, positioning, and equipment. PROCEDURE SUMMARY:    The patient was placed in supine position. The neck/chest region were prepped and draped in sterile fashion using chlorohexidine scrub. The vein was accessed with 18 G needle using ultrasound and non-pulsatile venous blood was withdrawn. Using modified Saldinger technique a guide wire was then advanced into the lumen of the vein. A 2 mm incision was then made at needle-wire entry site. While maintaining control of the guide wire, the needle was removed from the site. A dilator was advanced and then removed over the wire successfully dilating the vein. A 20 cm line, which was flushed prior to insertion, was then advanced into place and guide wire was removed. All the ports were flushed successfully. The line was secured in place with simple sutures. A sterile occlusive dressing was applied over catheter hub. CXR was ordered. No evidence of pneumothorax was appreciated.  Line position was confirmed in the SVC           COMPLICATIONS: None    ESTIMATED BLOOD LOSS: 5 ml    Electronically signed by Donald Carlin MD

## 2020-11-21 NOTE — FLOWSHEET NOTE
11/21/20 1152   Encounter Summary   Services provided to: Family   Referral/Consult From: Nurse;Family   Continue Visiting Yes  (11/21 NR pt coded)   Complexity of Encounter High   Length of Encounter 45 minutes   Routine   Type Initial   Assessment Tearful   Crisis   Type Code   Assessment Unable to respond   Intervention Prayer   Outcome Expressed gratitude;Did not respond   During my contact with the 80 yr old  patient he coded and was unresponsive. The pt's spouse Trang Gooden and their daughter were tearfully present. The pt will be moved to ICU. I will continue to follow the pt and family and provided Spiritual support.

## 2020-11-21 NOTE — PROGRESS NOTES
Patient seen and examined during hemodialysis treatment    Currently on 70% FiO2  Blood flow rate to 50  Dialysate flow rate of 500  Removing 3 kg of ultrafiltration  Blood pressure 106/34  On Levophed and amiodarone drip  Discussed with family members  Discussed with HD RN and ICU

## 2020-11-21 NOTE — ACP (ADVANCE CARE PLANNING)
Advance Care Planning     Advance Care Planning Activator (Inpatient)  Conversation Note  Late entry  Met with family in in ED family conversation room at 10:20    Date of ACP Conversation: 11/21/2020    [employer] with: wife and Jas Mauricio daughter    ACP Activator: Fahad Ames    If no Authorized Decision Maker has previously been identified, then patient chooses Health Care Decision Maker:  \"Who would you like to name as your primary health care decision-maker? \"               Name: Misa Barrett        Relationship: spouse          Phone number: [phone]  \"Can this person be reached easily? \" Yes  \"Who would you like to name as your back-up decision maker? \"   Name: Boogie Vega        Relationship: child          Phone number: [phone]  \"DCY this person be reached easily? \" Yes  Care Preferences    Ventilation: \"If you were in your present state of health and suddenly became very ill and were unable to breathe on your own, what would your preference be about the use of a ventilator (breathing machine) if it were available to you? \"      Would the patient desire the use of ventilator (breathing machine)?: yes    \"If your health worsens and it becomes clear that your chance of recovery is unlikely, what would your preference be about the use of a ventilator (breathing machine) if it were available to you? \"     Would the patient desire the use of ventilator (breathing machine)?: family wants \"all the bells and whistles\"      Resuscitation  \"CPR works best to restart the heart when there is a sudden event, like a heart attack, in someone who is otherwise healthy. Unfortunately, CPR does not typically restart the heart for people who have serious health conditions or who are very sick. \"    \"In the event your heart stopped as a result of an underlying serious health condition, would you want attempts to be made to restart your heart (answer \"yes\" for attempt to resuscitate) or would you prefer a natural death (answer \"no\" for do not attempt to resuscitate)? \" yes       [] Yes   [x] No   Educated Patient / Francheska Up regarding differences between Advance Directives and portable DNR orders. Length of ACP Conversation in minutes:  76     Pt came into ED in cardiac arrest.  Spiritual care (who happen to be their  - Jaida Andes) had met with family at that time. They are still working on pt charge nurse Nicolas Gandhi asked if I could sit with pt's family for awhile. Sat with family much emotional support given, prayers, hugs. Family continues to want \"all the bells and whistles\" but realize that it may be God's timing. They are very spiritual thanking the [facility] for his almighty presence. Asking for the healing of the [employer]. Before pt was being transferred to CT scan and then moving to ICU brought family into pt's room for prayer. Additional emotional support given and hugs given. They state that they know the way to ICU. Instructed to reach out for and assist needed.

## 2020-11-21 NOTE — CONSULTS
with HD RN. Reportedly patient was otherwise in his usual health up until this event. As stated above he was on his way to dialysis. Patient is usually very compliant with dialysis treatments. He does have chronic hypotension and requires midodrine. However there have been no reported complaints or events related to chest pain or any shortness of breath before all this happened. Past Medical History:  Past Medical History:   Diagnosis Date    Acute on chronic combined systolic and diastolic congestive heart failure (HCC)     Aortic stenosis     [facility] fibrillation ([facility] 1/25/2017    [facility] 1/25/2017    COPD (chronic obstructive pulmonary disease) ([facility])     Coronary artery disease     DM (diabetes mellitus), type 2 with renal complications ([facility]     DVT (deep venous thrombosis) [facility]     Hemodialysis patient ([facility] 10/22/2016    with [facility]    Hyperlipidemia     Hypertension     Morbid obesity with BMI of 50.0-59.9, adult ([facility]     Obstructive sleep apnea     On home oxygen therapy 2013    KELSEY treated with BiPAP     Osteoarthritis     Pacemaker     [place] dual pacer    Prostate enlargement        Past Surgical History:  Past Surgical History:   Procedure Laterality Date    AORTIC VALVE REPLACEMENT  2/3/2010    tissue valve    CARDIAC CATHETERIZATION  1 20 2010    Severe AS. Mild MV stenosis. Normal coronary arteries. Normal LV systolic function. EF 70%. Moderate concentric LVH. SPAP 47/20.  CARDIAC CATHETERIZATION  8 12 2009    Transvenous dual chamber permanent pacemaer implantation.  CARDIAC CATHETERIZATION  11 16 2007    Normal coronary arterties. EF 65%. LVH. Mild to moderate AS w/ AV area of 1.47 squared. SPAP 45/19    CARDIOVASCULAR STRESS TEST  7 15 2009    Sinus rhythm w/ average heart rate of 60 bpm. Borderline to mild NE interval prolongation throughout majority of recording. Increased QRS duration compatible w/ right BBB. History    Not on file   Social Needs    Financial resource strain: Not on file    Food insecurity     Worry: Not on file     Inability: Not on file    Transportation needs     Medical: Not on file     Non-medical: Not on file   Tobacco Use    Smoking status: Former Smoker     Years: .00     Types: Pipe, Cigars     Last attempt to quit: 1993     Years since quittin.7    Smokeless tobacco: Former User     Types: Chew   Substance and Sexual Activity    Alcohol use: No    Drug use: No    Sexual activity: Not Currently   Lifestyle    Physical activity     Days per week: Not on file     Minutes per session: Not on file    Stress: Not on file   Relationships    Social connections     Talks on phone: Not on file     Gets together: Not on file     Attends Advent service: Not on file     Active member of club or organization: Not on file     Attends meetings of clubs or organizations: Not on file     Relationship status: Not on file    Intimate partner violence     Fear of current or ex partner: Not on file     Emotionally abused: Not on file     Physically abused: Not on file     Forced sexual activity: Not on file   Other Topics Concern    Not on file   Social History Narrative    Not on file       Home Meds:  Prior to Admission medications    Medication Sig Start Date End Date Taking? Authorizing Provider   LANTUS SOLOSTAR 100 UNIT/ML injection pen INJECT 50 UNITS INTO THE SKIN NIGHTLY 20  Yes Ramona Gaston MD   warfarin (COUMADIN) 3 MG tablet TAKE AS DIRECTED BY [facility].  200 TABLETS FOR 90 DAYS 10/26/20  Yes Ramona Gaston MD   metoprolol tartrate (LOPRESSOR) 25 MG tablet PLEASE SEE ATTACHED FOR DETAILED DIRECTIONS  Patient taking differently: Take 25 mg by mouth Indications: Pt is to take a half tab on non dialysis days  20  Yes Rekha Dennis MD   midodrine (PROAMATINE) 10 MG tablet Take 1 tablet by mouth 3 times daily (with meals) Take with 5 mg to total 15 mg 8/11/20  Yes Edgar Chauhan MD   isosorbide mononitrate (IMDUR) 30 MG extended release tablet Take 1 tablet by mouth daily Indications: Treatment to Prevent Angina 8/11/20  Yes Edgar Chauhan MD   insulin aspart (NOVOLOG FLEXPEN) 100 UNIT/ML injection pen 2-16 units 3 times a day as per scale 8/11/20  Yes Edgar Chauhan MD   B Complex-C-Folic Acid (ALBAN CAPS) 1 MG CAPS TAKE 1 CAPSULE BY MOUTH EVERY DAY 6/26/20  Yes Verenice Guzmán MD   atorvastatin (LIPITOR) 40 MG tablet Take 1 tablet by mouth daily 5/5/20  Yes Edgar Chauhan MD   fludrocortisone (FLORINEF) 0.1 MG tablet Take 1 tablet by mouth daily Indications: Blood Pressure Drop Upon Standing 5/5/20  Yes Edgar Chauhan MD   tamsulosin [facility]) 0.4 MG capsule Take 1 capsule by mouth daily 5/5/20  Yes Edgar Chauhan MD   fluticasone [facility]) 50 MCG/ACT nasal spray SPRAY 2 SPRAYS INTO EACH NOSTRIL EVERY DAY 4/16/20  Yes Edgar Chauhan MD   Ferric Citrate (AURYXIA) 1  MG(Fe) TABS Take 1 tablet by mouth 3 times daily 2/6/20  Yes Frederic Price MD   midodrine (PROAMATINE) 5 MG tablet 5 mg 3 times daily (with meals) With 10 mg to total 15 mg TID 8/13/19  Yes Francisca Marsh MD   aspirin 81 MG chewable tablet Take 1 tablet by mouth daily 1/4/18  Yes Edgar Chauhan MD   timolol (TIMOPTIC) 0.5 % ophthalmic solution Place 1 drop into both eyes 2 times daily Indications: Disease of the Eye 10/3/17  Yes Edgar Chauhan MD   [facility] VERIO strip TEST BLOOD SUGAR 3 TIMES A DAY E11.9 5/12/20   Edgar Chauhan MD   acetaminophen (TYLENOL) 325 MG tablet Take 2 tablets by mouth every 4 hours Indications: Pain 2/6/20   Frederic Price MD   Spacer/Aero-Holding Chambers ZOHREH 1 Device by Does not apply route 2 times daily With Symbicort inhaler 1/8/20   Ole Span, APRN - CNP   SALINE NASAL SPRAY NA 1 spray by Nasal route as needed Indications: Dry Nose     Historical Provider, MD   budesonide-formoterol (SYMBICORT) 80-4.5 Height:         Weight:   Wt Readings from Last 3 Encounters:   11/21/20 (!) 360 lb 14.3 oz (163.7 kg)   11/19/20 (!) 342 lb (155.1 kg)   08/11/20 (!) 342 lb (155.1 kg)     Constitutional and General Appearance: Ill-appearing, intubated, sedated currently on mechanical ventilator, currently receiving dialysis treatment, on IV Levophed  Eyes: no icteric sclera in left eye or right eye,  no pallor conjunctiva in left or right eye, no discharge seen from left eye or right eye  Ears and Nose: normal external appearance of left and right ear. Normal external appearance of nose. No active drainage from nose. Oral: +ET present  Neck: Supple, multiple lines  Lungs: Diminished air entry, limited exam/breath sounds secondary to body habitus  Heart:  S1, S2  Extremities: Chronic 1+ LE edema  GI: Soft, obese  Skin: warm to touch  Musculo: No obvious joint deformities noted  Neuro: No facial drooping noted  Psychiatric: Cannot be assessed    Lab Data  CBC:   Recent Labs     11/21/20  0950   WBC 9.7   HGB 10.0*   HCT 34.6*        BMP:  Recent Labs     11/19/20  1528 11/21/20  0950 11/21/20  1001   NA  --  140 134*   K  --  5.1 10.1*   CL  --  95*  --    CO2  --  29  --    BUN  --  56*  --    CREATININE  --  8.5*  --    GLUCOSE 106 350*  --    CALCIUM  --  11.0*  --      PTH: @PTH@  TSH: No results for input(s): TSH in the last 72 hours. HgBa1c:   Recent Labs     11/19/20  1528   LABA1C 7.1*     Hepatic:   Recent Labs     11/21/20  0950   LABALBU 3.7   AST 21   ALT 18   BILITOT 0.3   ALKPHOS 202*     Additional Labs proBNP 9500, lactic acid 1.5  Diagnostics: Chest x-ray: Bilateral pleural effusions, CHF, cardiomegaly    Old labs and diagnostics reviewed. Echo: EF 50%    Impression and Plan:  1. ESRD on hemodialysis: Tolerating hemodialysis treatment well using conventional hemodialysis. Attempting ultrafiltration goal of 3.4 kg using blood flow rate of 250 due to hypotension.   However currently blood pressure is staying relatively stable with Levophed. He is however requiring 70% FiO2. Will check a potassium level post dialysis. However immediately after receiving medical management in the emergency room his potassium level came down to 5.1.  2. Acid-base status: ABG earlier this morning showed acute hypercarbic respiratory acidosis improved with mechanical ventilation  3. Cardiac arrest status post ROSC  4. Critical hyperkalemia   5. Chronic atrial fibrillation  6. Diabetes mellitus  7. History of aortic valve replacement  8. Morbid obesity    Thank you for the consult. Please feel free to call me if you have any questions.    Discussed with ICU staff, hemodialysis RN  Discussed with family members    Terri Ortiz MD  [facility]

## 2020-11-22 PROBLEM — G93.1 ANOXIC BRAIN INJURY (HCC): Status: ACTIVE | Noted: 2020-01-01

## 2020-11-22 NOTE — FLOWSHEET NOTE
Dr Natalie Phillip to bedside. Update given on pt myoclonic jerking starting yesterday afternoon with increase as evening progressed. Also need to start sedation. Dr Natalie Phillip states he would like the EEG completed today. 1053 EEG techRosa, notified of need for EEG today.

## 2020-11-22 NOTE — CONSULTS
NEUROLOGY CONSULT NOTE      Requesting Physician: Tyler Key MD    Reason for Consult:  Evaluate for anoxic brain injury    History of Present Illness:  Freida Billings is a 80 y.o. male admitted to [address] [place] [address] on 11/21/2020. He presented to [facility] on 11/21/2020 with complaints of cardiac arrest.  He has a past medical history of prostate enlargement, pacemaker, hypoxic respiratory failure, obstructive sleep apnea, morbid obesity, hypertension, hyperlipidemia, end-stage renal disease, DVT, diabetes mellitus type 2, CAD, COPD, atrial fibrillation, aortic stenosis, heart failure. Per report patient was on his way to hemodialysis with his daughter when he became unresponsive in the car. Per report prior to him becoming unresponsive he did try to put on his oxygen. There was no report of chest pain at this time. Patient was brought to the emergency department where he was removed from the car by emergency department employees and chest compressions were initiated. CPR lasted for a reported 7 minutes which did include to reported defibrillations for V. fib. ABG labs did report a potassium of 10.1. He was given multiple doses of insulin, calcium, bicarb, albuterol, Lasix. ROSC was obtained in the emergency department. Neurology was consulted for myoclonic jerking status post cardiac arrest.   Patient is unable to provide history, history obtained from review of medical record.         Past Medical History:        Diagnosis Date    Acute on chronic combined systolic and diastolic congestive heart failure (HCC)     Aortic stenosis     Atrial fibrillation ([address] 1/25/2017    Atrial flutter ([address] 1/25/2017    COPD (chronic obstructive pulmonary disease) ([address]     Coronary artery disease     DM (diabetes mellitus), type 2 with renal complications ([address]     DVT (deep venous thrombosis) ([address]     Hemodialysis patient ([address] 10/22/2016    with [facility]  Hyperlipidemia     Hypertension     Morbid obesity with BMI of 50.0-59.9, adult ([address]     Obstructive sleep apnea     On home oxygen therapy 2013    KELSEY treated with BiPAP     Osteoarthritis     Pacemaker     [employer] dual pacer    Prostate enlargement            Procedure Laterality Date    AORTIC VALVE REPLACEMENT  2/3/2010    tissue valve    CARDIAC CATHETERIZATION  1 20 2010    Severe AS. Mild MV stenosis. Normal coronary arteries. Normal LV systolic function. EF 70%. Moderate concentric LVH. SPAP 47/20.  CARDIAC CATHETERIZATION  8 12 2009    Transvenous dual chamber permanent pacemaer implantation.  CARDIAC CATHETERIZATION  11 16 2007    Normal coronary arterties. EF 65%. LVH. Mild to moderate AS w/ AV area of 1.47 squared. SPAP 45/19    CARDIOVASCULAR STRESS TEST  7 15 2009    Sinus rhythm w/ average heart rate of 60 bpm. Borderline to mild MI interval prolongation throughout majority of recording. Increased QRS duration compatible w/ right BBB. Intermittent episodes of Mobitz type 2 secondary AV block, manifested as non-conducted sinus impulses which were not premature. Intermittent episodes of high-grade AV block terminated by an idioventricular escape rhythm, 30bpm.    CARDIOVASCULAR STRESS TEST  11 09 2007    Normal stress test. HTN. DM type 2.     COLONOSCOPY      DIALYSIS FISTULA CREATION  07/27/11    Rt Wrist    ENDOSCOPY, COLON, DIAGNOSTIC      EYE SURGERY      OTHER SURGICAL HISTORY  09/13/2016    FIBEROPTIC BRONCHOSCOPY    PACEMAKER PLACEMENT      MI COLONOSCOPY FLX DX W/COLLJ SPEC WHEN PFRMD N/A 9/7/2018    COLONOSCOPY performed by Char Amador MD at [facility]    MI EGD TRANSORAL BIOPSY SINGLE/MULTIPLE N/A 9/7/2018    EGD performed by Char Amador MD at [address] [place] [address]  6yrs old   [address] ECHOCARDIOGRAM  12 09 2009    Severely dilated LV w/ moderate LVH. EF 55-56.4%.  Mildly dilated RV w/ normal systolic function. Grade 2 diastolic dysfunction w/ severely elevated LA pressure. Severe calcific AS. No AI. Mild MV stenosis w/ mild to moderate MR. Mild TR w/ mildly elevated RVSP, 40-45mmHg. Moderately dilated La dn RA.  TRANSTHORACIC ECHOCARDIOGRAM  11 09 2007    Technically limited d/t body habitus. Preserved systolic function of LV w/ diffuse LVH. EF 50-55%. Moderate to moderately severe AS. Trace AI. Slight enlargement of LA, trace MR. RA and RV normal contractility. Trace TR. Pulmonary vein and pulmonary valve were not visualized very well, neither aortic arch.      UPPER GASTROINTESTINAL ENDOSCOPY         Allergies:    No Known Allergies     Current Medications:   dextrose 50 % IV solution, Once  glucose (GLUTOSE) 40 % oral gel 15 g, PRN  dextrose 50 % IV solution, PRN  glucagon (rDNA) injection 1 mg, PRN  dextrose 5 % solution, PRN  insulin lispro (HUMALOG) injection vial 0-18 Units, Q4H  vasopressin 20 Units in sodium chloride 0.9 % 100 mL infusion, Continuous  piperacillin-tazobactam (ZOSYN) 3.375 g in sodium chloride 0.9 % 50 mL IVPB, Q12H  insulin glargine (LANTUS) injection vial 30 Units, BID  calcium gluconate 2 g in dextrose 5 % 100 mL IVPB, Once  prismaSATE BGK 4/2.5 dialysis solution, Continuous  prismaSol BGK 2/3.5 dialysis solution, Continuous  prismaSol BGK 2/3.5 dialysis solution, Continuous  sodium chloride flush 0.9 % injection 10 mL, 2 times per day  sodium chloride flush 0.9 % injection 10 mL, PRN  acetaminophen (TYLENOL) tablet 650 mg, Q6H PRN    Or  acetaminophen (TYLENOL) suppository 650 mg, Q6H PRN  polyethylene glycol (GLYCOLAX) packet 17 g, Daily PRN  promethazine (PHENERGAN) tablet 12.5 mg, Q6H PRN    Or  ondansetron (ZOFRAN) injection 4 mg, Q6H PRN  perflutren lipid microspheres (DEFINITY) injection 1.65 mg, ONCE PRN  vasopressin 20 Units in dextrose 5 % 100 mL infusion, Continuous  aspirin chewable tablet 81 mg, Daily  atorvastatin (LIPITOR) tablet 40 mg, Nightly  propofol injection, Titrated  amiodarone (NEXTERONE) 360 mg in dextrose 5% 200 ml, Continuous  levETIRAcetam (KEPPRA) 500 mg in sodium chloride 0.9 % 100 mL IVPB, Q24H  calcium replacement protocol, RX Placeholder  chlorhexidine (PERIDEX) 0.12 % solution 15 mL, BID  pantoprazole (PROTONIX) injection 40 mg, Daily  midazolam (VERSED) 100 mg in dextrose 5 % 100 mL infusion, Continuous         Social History:  Social History     Tobacco Use   Smoking Status Former Smoker    Years: 20.00    Types: Pipe, Cigars    Last attempt to quit: 1993    Years since quittin.7   Smokeless Tobacco Former User    Types: Chew     Social History     Substance and Sexual Activity   Alcohol Use No     Social History     Substance and Sexual Activity   Drug Use No         Family History:       Problem Relation Age of Onset    Diabetes Father     Diabetes Sister     Diabetes Brother        Review of Systems:  Unobtainable due to patient's condition    I reviewed the patient PMH,medications, family history, social history. Physical Exam:  BP (!) 130/53   Pulse 72   Temp 99 °F (37.2 °C) (Bladder)   Resp 26   Ht 5' 10\" (1.778 m)   Wt (!) 354 lb 4.5 oz (160.7 kg)   SpO2 100%   BMI 50.83 kg/m²  I Body mass index is 50.83 kg/m². I   Wt Readings from Last 1 Encounters:   20 (!) 354 lb 4.5 oz (160.7 kg)        General appearance - The patient is obtuded, sedated on the ventilator. No abnormal movement. does not cough with suctioning. Breathing at the vent. Mental status -unable to assess due to condition. Mood is unable to assess. Neck - supple, no significant adenopathy, carotids upstroke normal bilaterally,There is no limitation of ROM of the neck. There is no LAP in the neck. There is no thyroid enlargement. Neurological -   Cranial [id number]-SM-THT:   Cranial nerve II: unable to assess. Cranial nerve III: Pupils: pin point.  round, non reactive light   Cranial nerves III, IV, VI: VOR are decreased/difficult to assess. Cranial nerve V: Facial sensation: corneal absent  Cranial nerve VII:Facial strength: symmetric grossly  Cranial nerve VIII: unable to assess. Cranial nerve IX: Palate Elevation unable to assess. Cranial nerve XI: Shoulder shrug unable to assess. Cranial nerve XII: Tongue movement:  unable to test.  No movement disorder noted  neck is supple  on exam  DTR's are decreased distal and symmetric  Babinski sign is equivocal on bilaterally. Romberg unable to perform  Motor exam is does not localize to pain in any of his 4 extremities, there is normal muscle tone . There is no muscle atrophy. There is no muscle fasciculation   Sensory is intact for does not localizes to pain . Coordination: unable to assess. Gait and station: unable to assess. Abnormal movement none. vibration proprioception unable to assess. Skin - no rashes or lesions, bruises   Superficial temporal artery pulses are normal.   Musculoskeletal:  Has no hand arthritis, no limitation of ROM in any of the four Extremities. no joint tenderness, deformity or swelling. There is +1 leg edema. The Heart was regular in rate and rhythm. No heart murmur  Abdomen: Soft, obese Intact bowel sounds.        Labs:    CBC:   Recent Labs     11/21/20  1750 11/21/20  2250 11/22/20  0710   WBC 20.0* 19.0* 17.4*   HGB 10.4* 10.4* 9.8*    193 193   MCV 90.8 90.0 90.3   MCH 27.2 27.3 27.2   MCHC 30.0* 30.3* 30.2*     CMP:  Recent Labs     11/21/20  1750 11/22/20  0115 11/22/20  0710    136 136   K 4.2 5.6* 5.3*   CL 95* 91* 92*   CO2 27 26 28   BUN 35* 43* 46*   CREATININE 6.0* 6.9* 7.5*   LABGLOM 11* 9* 8*   GLUCOSE 227* 330* 300*   CALCIUM 9.6 9.6 9.9     Liver:   Recent Labs     11/22/20  0710   AST 69*   ALT 70*   ALKPHOS 178*   PROT 7.1   LABALBU 4.0   BILITOT 0.5       Results for orders placed during the hospital encounter of 11/21/20   CT HEAD WO CONTRAST    Narrative CT Head WITHOUT IV contrast    Comparison:  CT  - CT HEAD WO emergency department. ROSC was obtained. Patient did not undergo TTM due to limited access. Neurology was consulted for myoclonic jerking s/p cardiac arrest. At the time of my evaluation, there is no abnormal movement noted, his pupils are pinpoint and fixed, he does not withdrawal to pain. I suspect the patient suffered anoxic brain injury given events transpired and clinical picture. We will arrange for him to undergo repeat CT head WO contrast in 24 hours from initial CT head, to assess grey white matter differentiation changes, as well as EEG to screen for seizure tendency. Supportive care given, prognosis is guarded. Case discussed with nursing staff. Recommendations:                                              1. CT head WO contrast reviewed, repeat CT head WO contrast after 24 hours of initial study. 2. EEG evaluate for seizure tendency. 3. DVT prophylaxis  4. Supportive care  5. Prognosis guarded  6. Discussed with primary service and nursing staff.   7. Dr Cheyenne Merrill will assume care in am.       Electronically signed by Juan M Greenwood MD on 11/22/2020 at 10:53 AM

## 2020-11-22 NOTE — PROGRESS NOTES
[facility] Laboratory Technician Worksheet      EEG Date: 2020    Name: [place] Hatchet   : 1934   Age: 80 y.o. SEX: male    ROOM: 11 MRN: [id number]           CSN: [id number]      Ordering Provider: shonna LUCAS Number: [id number] Time of Test:  6913    Hand: Right   Sedation: yes - PROPOFOL AND VERSED DRIPS    H.V. Done: No ON VENT Photic: No SHARPS DURING TEST    Sleep: Yes  Drowsy: No   Sleep Deprived: No    Seizures observed: no    Mentality: comatose      Clinical History:CODE WHILE DRIVING WITH WIFE. CPR FOR APPROXIMATELY 7 MINUTES. NOW ON VENT HAVING JERKING MOVEMENTS MYOCLONUS VERSES SEIZURES.      Past Medical History:       Diagnosis Date    Acute on chronic combined systolic and diastolic congestive heart failure (HCC)     Aortic stenosis     Atrial fibrillation (HCC) 2017    Atrial flutter ([facility] 2017    COPD [facility]     Coronary artery disease     DM (diabetes mellitus), type 2 with renal complications ([facility])     DVT (deep venous thrombosis) ([facility]     [facility]od[facility] 10/22/2016    [facility]    Hyperlipidemia     Hypertension     Morbid obesity with BMI of 50.0-59.9, adult [facility]     Obstructive sleep apnea     On home oxygen therapy     KELSEY treated with BiPAP     Osteoarthritis     Pacemaker     [employer] dual pacer    Prostate enlargement        Scheduled Meds:   dextrose  25 g Intravenous Once    insulin lispro  0-18 Units Subcutaneous Q4H    IVPB builder  3.375 g Intravenous Q12H    insulin glargine  30 Units Subcutaneous BID    sodium chloride flush  10 mL Intravenous 2 times per day    aspirin  81 mg Oral Daily    atorvastatin  40 mg Oral Nightly    levetiracetam  500 mg Intravenous Q24H    calcium replacement protocol   Other RX Placeholder    chlorhexidine  15 mL Mouth/Throat BID    pantoprazole  40 mg Intravenous Daily Continuous Infusions:   dextrose      vasopressin (Septic Shock) infusion 0.03 Units/min (11/22/20 1200)    prismaSATE BGK 4/2.5      prismaSol BGK 2/3.5      prismaSol BGK 2/3.5      propofol 20 mcg/kg/min (11/22/20 5886)    Amiodarone 0.5 mg/min (11/22/20 7772)    midazolam 3 mg/hr (11/21/20 6511)     PRN Meds:.glucose, dextrose, glucagon (rDNA), dextrose, sodium chloride flush, acetaminophen **OR** acetaminophen, polyethylene glycol, promethazine **OR** ondansetron, perflutren lipid microspheres    Technician: Amina Becerra 11/22/2020

## 2020-11-22 NOTE — PROGRESS NOTES
CRITICAL CARE PROGRESS NOTE      Patient:  Luis Chaparro    Unit/Bed:4D-11/011-A  YOB: 1934  MRN: [id number]   PCP: Ifrah Suarez MD  Date of Admission: 11/21/2020  Chief Complaint:- Cardiac arrest    Assessment and Plan:      1. Acute on chronic hypoxic/hypercapnic respiratory failure: Patient wears nasal cannula at baseline at home. Patient intubated in the emergency department. Maintain peak pressure less than 35.  2. Cardiac arrest: 7-minute known code in the emergency department. Unclear how long patient was pulseless prior to arrival to the emergency department. ROSC was obtained. Patient will not undergo TTM due to limited access, Dr. Monaco Signs did update family on decision   3. Anoxic brain injury: EEG pending, concern for myoclonus. Neurology is following. Repeat EEG positive for myoclonus  4. Acute hyperkalemia: Potassium noted in the emergency department at 10.1, patient had noted treatment with medications for hyperkalemia. Emergent dialysis has been started. Nephrology consulted. CRRT  5. End-stage renal disease on dialysis: Dialysis initiated. Nephrology consulted. 6. Chronic atrial fib: Amiodarone drip initiated in the emergency department. Coumadin held due to concern for pleural hemorrhage. INR noted at 3.02 today. Start heparin drip once INR is less than 1.5  7. Diabetes mellitus: Lantus and sliding scale insulin. Last A1c 11/19 noted at 7.1.  8. Bioprosthetic valve: History of aortic valve replacement. Coumadin held secondary to concern for pleural hemorrhage. Will start heparin drip once INR is less than 1.5.     INITIAL H AND P AND ICU COURSE:  Luis Chaparro is an 42-year-old black male who presented to [facility] on 11/21/2020 with complaints of cardiac arrest.  He has a past medical history of prostate enlargement, pacemaker, hypoxic respiratory failure, obstructive sleep apnea, morbid obesity, hypertension, hyperlipidemia, end-stage renal B/P:  114/47. FiO2:  40. O2 Sat:  100. I/O:  2476/3400  Body mass index is 50.83 kg/m². General: Acute on chronically ill-appearing black male  HEENT:  normocephalic and atraumatic. No scleral icterus. PERR  Neck: supple. No Thyromegaly. Lungs: clear to auscultation. No retractions  Cardiac: RRR. No JVD. Abdomen: soft. Nontender. Extremities:  No clubbing, cyanosis, or edema x 4. Vasculature: capillary refill < 3 seconds. Palpable dorsalis pedis pulses. Skin:  warm and dry. Psych: Follows no commands, does not withdraw from pain. Spontaneous cough  Lymph:  No supraclavicular adenopathy. Neurologic: Myoclonic jerking    Data: (All radiographs, tracings, PFTs, and imaging are personally viewed and interpreted unless otherwise noted).  Sodium 136, potassium 5.3, chloride 92, CO2 28, BUN 46, creatinine 7.5, glucose 283   WBC 15.6, hemoglobin 9.3, hematocrit 30.6, platelet count 516   Telemetry shows atrial fibrillation   Chest x-ray 11/21/2020 reports widening of the pleural space could represent pleural hemorrhage. (Updated Dr. Lane Lawrence).  Echocardiogram 8/22/2018 ports ejection fraction 50%, mild global hypokinesis. Noted bioprosthetic valve at aortic position. Meets Continued ICU Level Care Criteria:    [x] Yes   [] No - Transfer Planned to listed location:  [] HOSPITALIST CONTACTED- DR     Case and plan discussed with Dr. Lane Lawrence, Dr. Soledad Isaac and Dr. Itzel Salazar        Electronically signed by Lorri Egan.  NIC Christy - CNP  CRITICAL CARE SPECIALIST

## 2020-11-22 NOTE — PLAN OF CARE
Problem: Respiratory:  Goal: Will be able to breathe spontaneously, without ventilator support  Description: Will be able to breathe spontaneously, without ventilator support  Outcome: Ongoing     Pt requiring ventilatory support. Will continue to follow and wean as able.

## 2020-11-22 NOTE — PROGRESS NOTES
0000 Arlice Clutter CNP updated patient not responding to painful stimuli. No cough/gag. Pupils constricted and non reactive.

## 2020-11-22 NOTE — ED PROVIDER NOTES
[facility] ICU 4D    EMERGENCY MEDICINE     Pt Name: Danisha Jarrett  MRN: [id number]  [place] 1934  Date of evaluation: 11/21/2020  Provider: Chrystal Sarah DO, [place] COMPLAINT       Chief Complaint   Patient presents with   Jayda Amanda       HISTORY OF PRESENT ILLNESS    Danisha Jarrett is a pleasant 80 y.o. male who presents to the emergency department from home in the vehicle by his wife. She pulled up to the front door with the patient slumped over in the passenger seat; states approximately 5minutes prior to arrival; her  said he wasn't feeling well and slumped over; at the time they were on the way to dialysis. No further history is available. I was called to the wife's vehicle where the patient was found pulseless and apneic. Extrication attempts from the vehicle are ongoing. Triage notes and Nursing notes were reviewed by myself. Any discrepancies are addressed above. PAST MEDICAL HISTORY     Past Medical History:   Diagnosis Date    Acute on chronic combined systolic and diastolic congestive heart failure (HCC)     Aortic stenosis     Atrial fibrillation ([address] 1/25/2017    Atrial flutter ([address] 1/25/2017    COPD (chronic obstructive pulmonary disease) [facility]     Coronary artery disease     DM (diabetes mellitus), type 2 with renal complications ([facility]     DVT (deep venous thrombosis) ([facility]     Hemodialysis patient ([facility] 10/22/2016    with [facility]    Hyperlipidemia     Hypertension     Morbid obesity with BMI of 50.0-59.9, adult ([address]     Obstructive sleep apnea     On home oxygen therapy 2013    KELSEY treated with BiPAP     Osteoarthritis     Pacemaker     [employer] dual pacer    Prostate enlargement        SURGICAL HISTORY       Past Surgical History:   Procedure Laterality Date    AORTIC VALVE REPLACEMENT  2/3/2010    tissue valve    CARDIAC CATHETERIZATION  1 20 2010    Severe AS. Mild MV stenosis. Normal coronary arteries. Normal LV systolic function. EF 70%. Moderate concentric LVH. SPAP 47/20.  CARDIAC CATHETERIZATION  8 12 2009    Transvenous dual chamber permanent pacemaer implantation.  CARDIAC CATHETERIZATION  11 16 2007    Normal coronary arterties. EF 65%. LVH. Mild to moderate AS w/ AV area of 1.47 squared. SPAP 45/19    CARDIOVASCULAR STRESS TEST  7 15 2009    Sinus rhythm w/ average heart rate of 60 bpm. Borderline to mild AK interval prolongation throughout majority of recording. Increased QRS duration compatible w/ right BBB. Intermittent episodes of Mobitz type 2 secondary AV block, manifested as non-conducted sinus impulses which were not premature. Intermittent episodes of high-grade AV block terminated by an idioventricular escape rhythm, 30bpm.    CARDIOVASCULAR STRESS TEST  11 09 2007    Normal stress test. HTN. DM type 2.     COLONOSCOPY      DIALYSIS FISTULA CREATION  07/27/11    Rt Wrist    ENDOSCOPY, COLON, DIAGNOSTIC      EYE SURGERY      OTHER SURGICAL HISTORY  09/13/2016    FIBEROPTIC BRONCHOSCOPY    PACEMAKER PLACEMENT      AK COLONOSCOPY FLX DX W/COLLJ SPEC WHEN PFRMD N/A 9/7/2018    COLONOSCOPY performed by Beverley Castro MD at [facility] End[facility]    AK EGD TRANSORAL BIOPSY SINGLE/MULTIPLE N/A 9/7/2018    [address] [address] [facility] [address]  6[address]   [address] ECHOCARDIOGRAM  12 09 2009    Severely dilated LV w/ moderate LVH. EF 55-56.4%. Mildly dilated RV w/ normal systolic function. Grade 2 diastolic dysfunction w/ severely elevated LA pressure. Severe calcific AS. No AI. Mild MV stenosis w/ mild to moderate MR. Mild TR w/ mildly elevated RVSP, 40-45mmHg. Moderately dilated La dn RA.  TRANSTHORACIC ECHOCARDIOGRAM  11 09 2007    Technically limited d/t body habitus. Preserved systolic function of LV w/ diffuse LVH. EF 50-55%. Moderate to moderately severe AS. Trace AI. Slight enlargement of LA, trace MR.  RA and RV normal contractility. Trace TR. Pulmonary vein and pulmonary valve were not visualized very well, neither aortic arch.  UPPER GASTROINTESTINAL ENDOSCOPY         CURRENT MEDICATIONS       Current Discharge Medication List      CONTINUE these medications which have NOT CHANGED    Details   LANTUS SOLOSTAR 100 UNIT/ML injection pen INJECT 50 UNITS INTO THE SKIN NIGHTLY  Qty: 5 pen, Refills: 1      warfarin (COUMADIN) 3 MG tablet TAKE AS DIRECTED BY [facility].  200 TABLETS FOR 90 DAYS  Qty: 200 tablet, Refills: 1      metoprolol tartrate (LOPRESSOR) 25 MG tablet PLEASE SEE ATTACHED FOR DETAILED DIRECTIONS  Qty: 60 tablet, Refills: 1      !! midodrine (PROAMATINE) 10 MG tablet Take 1 tablet by mouth 3 times daily (with meals) Take with 5 mg to total 15 mg  Qty: 270 tablet, Refills: 1      isosorbide mononitrate (IMDUR) 30 MG extended release tablet Take 1 tablet by mouth daily Indications: Treatment to Prevent Angina  Qty: 90 tablet, Refills: 1      insulin aspart (NOVOLOG FLEXPEN) 100 UNIT/ML injection pen 2-16 units 3 times a day as per scale  Qty: 45 pen, Refills: 3      B Complex-C-Folic Acid (ALBAN CAPS) 1 MG CAPS TAKE 1 CAPSULE BY MOUTH EVERY DAY  Qty: 90 capsule, Refills: 0      atorvastatin (LIPITOR) 40 MG tablet Take 1 tablet by mouth daily  Qty: 90 tablet, Refills: 1      fludrocortisone (FLORINEF) 0.1 MG tablet Take 1 tablet by mouth daily Indications: Blood Pressure Drop Upon Standing  Qty: 90 tablet, Refills: 1      tamsulosin (FLOMAX) 0.4 MG capsule Take 1 capsule by mouth daily  Qty: 90 capsule, Refills: 1      fluticasone (FLONASE) 50 MCG/ACT nasal spray SPRAY 2 SPRAYS INTO EACH NOSTRIL EVERY DAY  Qty: 1 Bottle, Refills: 5      Ferric Citrate (AURYXIA) 1  MG(Fe) TABS Take 1 tablet by mouth 3 times daily  Qty: 270 tablet, Refills: 1      !! midodrine (PROAMATINE) 5 MG tablet 5 mg 3 times daily (with meals) With 10 mg to total 15 mg TID  Refills: 4      aspirin 81 MG chewable Years since quittin.7    Smokeless tobacco: Former User     Types: Chew   Substance and Sexual Activity    Alcohol use: No    Drug use: No    Sexual activity: Not Currently   Lifestyle    Physical activity     Days per week: None     Minutes per session: None    Stress: None   Relationships    Social connections     Talks on phone: None     Gets together: None     Attends [employer] service: None     Active member of club or organization: None     Attends meetings of clubs or organizations: None     Relationship status: None    Intimate partner violence     Fear of current or ex partner: None     Emotionally abused: None     Physically abused: None     Forced sexual activity: None   Other Topics Concern    None   Social History Narrative    None       REVIEW OF SYSTEMS     Review of Systems   Unable to perform ROS: Mental status change       Except as noted above the remainder of the review of systems was reviewed and is. PHYSICAL EXAM    (up to 7 for level 4, 8 or more for level 5)     ED Triage Vitals   BP Temp Temp Source Pulse Resp SpO2 Height Weight   20 0957 20 1027 20 1027 20 0957 20 0957 20 0957 20 1145 20 1041   (!) 151/95 97.2 °F (36.2 °C) Bladder 124 13 100 % 5' 10\" (1.778 m) (!) 342 lb (155.1 kg)       Physical Exam  Constitutional:       Comments: Pulseless and apenic; unresponsive; slumped over in passenger seat  No evidence of trauma   HENT:      Head: Normocephalic and atraumatic.       Right Ear: External ear normal.      Left Ear: External ear normal.      Nose: Nose normal.      Mouth/Throat:      Mouth: Mucous membranes are moist.   Eyes:      Comments: 1mm pupils bilaterally, unreactive   Cardiovascular:      Comments: No pulse  Sternotomy scar present  Pulmonary:      Comments: apneic  Abdominal:      Comments: Obese, BMP 50  No bruising   Musculoskeletal:      Comments: No bruising  No significant swelling  Fistula RUE   Skin: General: Skin is warm. Findings: No bruising. Neurological:      Comments: No corneals, no gag         DIAGNOSTIC RESULTS     EKG:(none if blank)  All EKG's are [facility] who either signs or Co-signs this chart in the absence of a cardiologist.    Olivia Francis indterminate rhythm alternating with some paced complexes    RADIOLOGY: (none if blank)   Interpretation per the Radiologistbelow, if available at the time of this note:        LABS:      All other labs were within normal range or not returned as of this dictation. Please note, any cultures that may have been sent were not resulted at the time of this patient visit. EMERGENCY DEPARTMENT COURSE andMedical Decision Making:     MDM/      Pt presented pulseless and apneic. Together with nursing staff we extricated patient from the vehicle; CPR initiated immediately and continued. IO x2 (bilat tibias placed by midlevel provider_. Pt intubated by resident under my direction. ACLS measures continued. Multiple dose of calclium chloride, bicarbonate, insulin/glucose given. Subsequently pt noted to be in VF, defibrillated x2. Amiodarone bolus & drip started. Postarrest--BP and HR stabilized. Scattered Myoclonic jerks noted. Immediatey post arrest VBG + electrolytes obtained via iSTAT--K+ is 10, as suspected based on wide complex indeterminate rhythm on EKG. Downtime post extrication approximately 7 minutes. Central line placed in groin emergently for access and coordinated emergent dialysis. Cooling and head CT being deferred due to need for emergent dialysis at this time. ED Course as of Nov 22 1842   Sat Nov 21, 2020   1015 D[facility]agrees with dialysis as soon as pt arrives in the ICU    [AB]   Miah [id number] paged    [AB]   [id number] Dr Ricky Ellington and Ashok Anne accept pt to ICU; aware of outstanding labs and need to reassess all outstanding data.   We feel that any additional delay from getting patient emergently dialyzed at this time is unwarranted. They agree    Attempted NG/OG but unable to see the OG. Will give Kayexelate rectally    [AB]   3890 Reassessed; pt having some myoclonic jerking at this time; no corneals or gag reflex    [AB]      ED Course User Index  [AB] Blinda Copper, DO         The patient was evaluated during the COVID-19 pandemic. At the time of presentation to the ED, a [facility] emergency is in effect due to widespread infections and high community spread. There is a significant strain on healthcare resources due to the pandemic.     ED Medications administered this visit:    Medications   amiodarone (NEXTERONE) 360 mg in dextrose 5% 200 ml (0 mg/min Intravenous Stopped 11/21/20 1645)   sodium chloride flush 0.9 % injection 10 mL (10 mLs Intravenous Given 11/22/20 0852)   sodium chloride flush 0.9 % injection 10 mL (has no administration in time range)   acetaminophen (TYLENOL) tablet 650 mg (650 mg Oral Given 11/22/20 0138)     Or   acetaminophen (TYLENOL) suppository 650 mg ( Rectal See Alternative 11/22/20 0138)   polyethylene glycol (GLYCOLAX) packet 17 g (has no administration in time range)   promethazine (PHENERGAN) tablet 12.5 mg (has no administration in time range)     Or   ondansetron (ZOFRAN) injection 4 mg (has no administration in time range)   perflutren lipid microspheres (DEFINITY) injection 1.65 mg (has no administration in time range)   vasopressin 20 Units in dextrose 5 % 100 mL infusion (0.03 Units/min Intravenous Rate/Dose Change 11/22/20 0848)   aspirin chewable tablet 81 mg (81 mg Oral Given 11/22/20 0852)   atorvastatin (LIPITOR) tablet 40 mg (40 mg Oral Given 11/21/20 2243)   propofol injection (35 mcg/kg/min × 163.7 kg Intravenous New Bag 11/22/20 1620)   amiodarone (NEXTERONE) 360 mg in dextrose 5% 200 ml (0.5 mg/min Intravenous New Bag 11/22/20 1700)   levETIRAcetam (KEPPRA) 500 mg in sodium chloride 0.9 % 100 mL IVPB (500 mg Intravenous New Bag 11/22/20 1806)   calcium replacement protocol ( Other Canceled Entry 11/21/20 2237)   midazolam PF (VERSED) injection 2 mg (has no administration in time range)   chlorhexidine (PERIDEX) 0.12 % solution 15 mL (15 mLs Mouth/Throat Given 11/22/20 0832)   pantoprazole (PROTONIX) injection 40 mg (40 mg Intravenous Given 11/22/20 0711)   norepinephrine (LEVOPHED) 16 mg in dextrose 5% 250 mL infusion (25 mcg/min Intravenous Rate/Dose Change 11/22/20 1448)   midazolam (VERSED) 100 mg in dextrose 5 % 100 mL infusion (5 mg/hr Intravenous Rate/Dose Change 11/22/20 1338)   insulin regular (HUMULIN R;NOVOLIN R) injection 10 Units (10 Units Intravenous Given 11/22/20 0422)     And   dextrose 50 % IV solution (25 g Intravenous Not Given 11/22/20 0422)   glucose (GLUTOSE) 40 % oral gel 15 g (has no administration in time range)   dextrose 50 % IV solution (has no administration in time range)   glucagon (rDNA) injection 1 mg (has no administration in time range)   dextrose 5 % solution (has no administration in time range)   insulin lispro (HUMALOG) injection vial 0-18 Units (6 Units Subcutaneous Given 11/22/20 1810)   vasopressin 20 Units in sodium chloride 0.9 % 100 mL infusion ( Intravenous Canceled Entry 11/22/20 1644)   piperacillin-tazobactam (ZOSYN) 3.375 g in sodium chloride 0.9 % 50 mL IVPB (3.375 g Intravenous New Bag 11/22/20 1807)   insulin glargine (LANTUS) injection vial 30 Units (30 Units Subcutaneous Given 11/22/20 1017)   prismaSATE BGK 4/2.5 dialysis solution (2,500 mL/hr Dialysis New Bag 11/22/20 1642)   prismaSol BGK 2/3.5 dialysis solution (500 mL/hr Dialysis New Bag 11/22/20 1357)   prismaSol BGK 2/3.5 dialysis solution (1,000 mL/hr Dialysis New Bag 11/22/20 1402)   norepinephrine (LEVOPHED) 16 mg in dextrose 5% 250 mL infusion (35 mcg/min Intravenous Rate/Dose Change 11/22/20 6335)   calcium gluconate 2 g in dextrose 5 % 100 mL IVPB (has no administration in time range) patient's condition requiring my urgent intervention. Total critical care time with the patient was 45 minutes excluding separately reportable procedures. Critical care required due to patients cardiac arrest.         CLINICAL       1. Cardiopulmonary arrest ([facility]    2. Hyperkalemia          DISPOSITION/PLAN   DISPOSITION Admitted 11/21/2020 10:37:31 AM      PATIENT REFERRED TO:  No follow-up provider specified.     DISCHARGE MEDICATIONS:  Current Discharge Medication List                 (Please note that portions of this note were completed with a voice recognition program.  Efforts were made to edit the dictations but occasionallywords are mis-transcribed.)      Shaheen Cervantes DO,FACEVELMA (electronically signed)  Attending Physician, Emergency [address], DO  11/22/20 3234

## 2020-11-22 NOTE — CONSULTS
[facility]    Patient's Name/Date of Birth: Ty Ponce / 1934 (89 y.o.)    Date: November 22, 2020     Referring Provider: Lul Ponce MD    CHIEF COMPLAINT:  Cardiac arrest      HPI: This is a pleasant 80 y.o. male presents with 11/21/2020 with cardiac arrest.  Hx of COPD, CAD, s/p SAVR, Afib, DM II, KELSEY, and ESRD on dialysis. Apparently was on his way to HD and he became unresponsive in the car, no reported chest pain, brought to the ED immediately, 7 minutes of CPR, found to have VF and potassium of 10. Given immediate medical therapy, and CPR, with ROSC. Medical therapy resulted in K of 5.1. Patient is no longer responding to stimuli and pupils are non-responsive. ECG with V-pacing, [place] PM.       Echo:   Results for orders placed during the hospital encounter of 08/22/18   ECHO Complete 2D W Doppler W Color    Narrative Transthoracic Echocardiography Report (TTE)     Demographics      Patient Name     Chanel Rubio Gender                Male      MR #             [id number]    Race                  Black                                    Ethnicity      Account #        [de-identified]    Room Number      Accession Number [id number]    Date of Study         08/22/2018      Date of Birth    1934   Referring Physician   Justin Logan MD      Age              80 year(s)   Sonographer           Francoise Gutierrez MD                                 Physician     Procedure    Type of Study      TTE procedure:ECHOCARDIOGRAM COMPLETE 2D W DOPPLER W COLOR. Procedure Date  Date: 08/22/2018 Start: 09:24 AM    Study Location: Echo Lab  Technical Quality: Limited visualization due to body habitus. Indications:Dyspnea / Shortness of breath.     Additional Medical History:Pacemaker, sleep apnea, diabetic, CHF, morbid  obesity, hypertension, hyperlipidemia, atrial fibrillation, aortic valve  replaced with a 25 mm bovine tissue valve 2010    Patient Status: Routine    Height: 70 inches Weight: 345.01 pounds BSA: 2.63 m^2 BMI: 49.5 kg/m^2    BP: 117/70 mmHg     Conclusions      Summary   Normally functioning bioprosthetic valve in aortic position. Ejection fraction is visually estimated at 50%. There was mild global hypokinesis of the left ventricle. The aortic valve leaflets were not well visualized. Normally functioning bioprosthetic valve in aortic position. Thickened aortic valve leaflets noted. Aortic valve leaflets are Moderately calcified. Myxomatotic degeneration of mitral valve. Calcification of the mitral valve noted. Decreased mitral valve mobility noted. Mild mitral regurgitation is present. Signature      ----------------------------------------------------------------   Electronically signed by Markus Casey MD (Interpreting   physician) on 08/22/2018 at 05:42 PM   ----------------------------------------------------------------      Findings      Mitral Valve   Myxomatotic degeneration of mitral valve. Calcification of the mitral valve noted. Decreased mitral valve mobility noted. Mild mitral regurgitation is present. Aortic Valve   The aortic valve leaflets were not well visualized. Normally functioning bioprosthetic valve in aortic position. Thickened aortic valve leaflets noted. Aortic valve leaflets are Moderately calcified. Tricuspid Valve   Tricuspid valve was not well visualized. Trivial tricuspid regurgitation visualized. Pulmonic Valve   The pulmonic valve was not well visualized . Trivial pulmonic regurgitation visualized. Left Atrium   Moderately dilated left atrium. Left Ventricle   Ejection fraction is visually estimated at 50%. There was mild global hypokinesis of the left ventricle.       Right Atrium   Right atrial size was normal.      Right Ventricle   The right ventricular size was normal with normal systolic function and   wall thickness. Pericardial Effusion   The pericardium was normal in appearance with no evidence of a pericardial   effusion. Pleural Effusion   No evidence of pleural effusion. Aorta / Great Vessels   -Aortic root dimension within normal limits.   -The Pulmonary artery is within normal limits. -IVC size is within normal limits with normal respiratory phasic changes.      M-Mode/2D Measurements & Calculations      LV Diastolic    LV Systolic Dimension: 3.7   LA Dimension: 4.6 cmAO Root   Dimension: 4.9  cm                           Dimension: 3.5 cmLA Area:   cm              LV Volume Diastolic: 692 ml  57.7 cm^2   LV FS:24.5 %    LV Volume Systolic: 93.7 ml   LV PW           LV EDV/LV EDV Index: 224   Diastolic: 0.9  MO/26 H^4RX ESV/LV ESV   cm              Index: 58.1 ml/22 m^2        RV Diastolic Dimension: 3.3   Septum          EF Calculated: 48.6 %        cm   Diastolic: 1 cm                                                LA/Aorta: 1.31                      LVOT: 2 cm                   LA volume/Index: 60.8 ml                                                /23m^2     Doppler Measurements & Calculations      MV Peak E-Wave: 130  AV Peak Velocity: 252    LVOT Peak Velocity: 94.1   cm/s                 cm/s                     cm/s   MV Peak A-Wave: 74.2 AV Peak Gradient: 25.4   LVOT Mean Velocity: 73.5   cm/s                 mmHg                     cm/s   MV E/A Ratio: 1.75   AV Mean Velocity: 184    LVOT Peak Gradient: 4   MV Peak Gradient:    cm/s                     mmHgLVOT Mean Gradient: 2   6.76 mmHg            AV Mean Gradient: 16     mmHg   MV Mean Gradient: 6  mmHg   mmHg                 AV VTI: 54.3 cm          TV Peak E-Wave: 78.1 cm/s   MV Mean Velocity:    AV Area                  TV Peak A-Wave: 39.3 cm/s   98.2 cm/s            (Continuity):1.07 cm^2   MV Deceleration pacemaer implantation.  CARDIAC CATHETERIZATION  11 16 2007    Normal coronary arterties. EF 65%. LVH. Mild to moderate AS w/ AV area of 1.47 squared. SPAP 45/19    CARDIOVASCULAR STRESS TEST  7 15 2009    Sinus rhythm w/ average heart rate of 60 bpm. Borderline to mild ME interval prolongation throughout majority of recording. Increased QRS duration compatible w/ right BBB. Intermittent episodes of Mobitz type 2 secondary AV block, manifested as non-conducted sinus impulses which were not premature. Intermittent episodes of high-grade AV block terminated by an idioventricular escape rhythm, 30bpm.    CARDIOVASCULAR STRESS TEST  11 09 2007    Normal stress test. HTN. DM type 2.     COLONOSCOPY      DIALYSIS FISTULA CREATION  07/27/11    Rt Wrist    ENDOSCOPY, COLON, DIAGNOSTIC      EYE SURGERY      OTHER SURGICAL HISTORY  09/13/2016    FIBEROPTIC BRONCHOSCOPY    PACEMAKER PLACEMENT      ME COLONOSCOPY FLX DX W/COLLJ SPEC WHEN PFRMD N/A 9/7/2018    COLONOSCOPY performed by Mitra Ward MD at [facility]    ME EGD TRANSORAL BIOPSY SINGLE/MULTIPLE N/A 9/7/2018    EGD performed by Mitra Ward MD at [address] [place] [address] Blvd  6yrs old   [address] ECHOCARDIOGRAM  12 09 2009    Severely dilated LV w/ moderate LVH. EF 55-56.4%. Mildly dilated RV w/ normal systolic function. Grade 2 diastolic dysfunction w/ severely elevated LA pressure. Severe calcific AS. No AI. Mild MV stenosis w/ mild to moderate MR. Mild TR w/ mildly elevated RVSP, 40-45mmHg. Moderately dilated La dn RA.  TRANSTHORACIC ECHOCARDIOGRAM  11 09 2007    Technically limited d/t body habitus. Preserved systolic function of LV w/ diffuse LVH. EF 50-55%. Moderate to moderately severe AS. Trace AI. Slight enlargement of LA, trace MR. RA and RV normal contractility. Trace TR. Pulmonary vein and pulmonary valve were not visualized very well, neither aortic arch.      UPPER GASTROINTESTINAL ENDOSCOPY       Current Facility-Administered Medications   Medication Dose Route Frequency Provider Last Rate Last Dose    dextrose 50 % IV solution  25 g Intravenous Once NIC Brush - CNP        glucose (GLUTOSE) 40 % oral gel 15 g  15 g Oral PRN Ema Thomas, APRN - CNP        dextrose 50 % IV solution  12.5 g Intravenous PRN Ema William, APRN - CNP        glucagon (rDNA) injection 1 mg  1 mg Intramuscular PRN Ema William, APRN - CNP        dextrose 5 % solution  100 mL/hr Intravenous PRN Ema William, APRN - CNP        insulin lispro (HUMALOG) injection vial 0-18 Units  0-18 Units Subcutaneous Q4H Ema Thomas, APRN - CNP   9 Units at 11/22/20 1154    vasopressin 20 Units in sodium chloride 0.9 % 100 mL infusion  0.03 Units/min Intravenous Continuous NIC Juarez CNP 9 mL/hr at 11/22/20 1200 0.03 Units/min at 11/22/20 1200    piperacillin-tazobactam (ZOSYN) 3.375 g in sodium chloride 0.9 % 50 mL IVPB  3.375 g Intravenous Q12H NIC Juarez CNP   Stopped at 11/22/20 8834    insulin glargine (LANTUS) injection vial 30 Units  30 Units Subcutaneous BID NIC Juarez CNP   30 Units at 11/22/20 1017    Mia Formica 4/2.5 dialysis solution   Dialysis Continuous Edi Sevilla MD        prismaSol BGK 2/3.5 dialysis solution   Dialysis Continuous Edi Sevilla MD        prismaSol BGK 2/3.5 dialysis solution   Dialysis Continuous Edi Sevilla MD        sodium chloride flush 0.9 % injection 10 mL  10 mL Intravenous 2 times per day NIC Juarez CNP   10 mL at 11/22/20 0852    sodium chloride flush 0.9 % injection 10 mL  10 mL Intravenous PRN NIC Juarez - CNP        acetaminophen (TYLENOL) tablet 650 mg  650 mg Oral Q6H PRN Elke Ann APRN - CNP   650 mg at 11/22/20 0138    Or    acetaminophen (TYLENOL) suppository 650 mg  650 mg Rectal Q6H PRN Elke Ann APRN - ANDREA        polyethylene glycol (GLYCOLAX) packet 17 g  17 g Oral Daily PRN Zofia Turner APRN - CNP        promethazine (PHENERGAN) tablet 12.5 mg  12.5 mg Oral Q6H PRN Zofia Turner APRN - CNP        Or    ondansetron [facility]) injection 4 mg  4 mg Intravenous Q6H PRN Zofia Turner, APRN - CNP        perflutren lipid microspheres (DEFINITY) injection 1.65 mg  1.5 mL Intravenous ONCE PRN Zofia Turner APRN - CNP        aspirin chewable tablet 81 mg  81 mg Oral Daily Zofia Turner, APRN - CNP   81 mg at 11/22/20 6846    atorvastatin (LIPITOR) tablet 40 mg  40 mg Oral Nightly Zofia Turner APRN - CNP   40 mg at 11/21/20 2243    propofol injection  10 mcg/kg/min Intravenous Titrated Codeyil Jair APRN - CNP 19.6 mL/hr at 11/22/20 0939 20 mcg/kg/min at 11/22/20 0939    amiodarone (NEXTERONE) 360 mg in dextrose 5% 200 ml  0.5 mg/min Intravenous Continuous Zofia Turner APRN - CNP 16.7 mL/hr at 11/22/20 0419 0.5 mg/min at 11/22/20 0419    levETIRAcetam (KEPPRA) 500 mg in sodium chloride 0.9 % 100 mL IVPB  500 mg Intravenous Q24H Stevo Puckett MD   Stopped at 11/21/20 1730    calcium replacement protocol   Other RX Placeholder Daril Jair APRN - CNP        chlorhexidine (PERIDEX) 0.12 % solution 15 mL  15 mL Mouth/Throat BID Daril Gentle, APRN - CNP   15 mL at 11/22/20 0852    pantoprazole (PROTONIX) injection 40 mg  40 mg Intravenous Daily Daril Jair, APRN - CNP   40 mg at 11/22/20 3256    midazolam (VERSED) 100 mg in dextrose 5 % 100 mL infusion  2 mg/hr Intravenous Continuous NIC Schneider - CNP 3 mL/hr at 11/21/20 2217 3 mg/hr at 11/21/20 2217     Prior to Admission medications    Medication Sig Start Date End Date Taking? Authorizing Provider   LANTUS SOLOSTAR 100 UNIT/ML injection pen INJECT 50 UNITS INTO THE SKIN NIGHTLY 11/17/20  Yes Amaury Mckeon MD   warfarin (COUMADIN) 3 MG tablet [facility]  200 TABLETS FOR 90 DAYS 10/26/20  Yes Amaury Mckeon MD   metoprolol tartrate (LOPRESSOR) 25 MG Liam Marino MD   Spacer/Aero-Holding Cordella [employer] ZOHREH 1 Device by Does not apply route 2 times daily With Symbicort inhaler 1/8/20   NIC Childress CNP   SALINE NASAL SPRAY NA 1 spray by Nasal route as needed Indications: Dry Nose     Historical Provider, MD   budesonide-formoterol (SYMBICORT) 80-4.5 MCG/ACT AERO Inhale 2 puffs into the lungs 2 times daily Rinse mouth after its use.  11/4/19 11/19/20  NIC Childress CNP   ammonium lactate (AMLACTIN) 12 % cream 2 times daily as needed for Dry Skin To feet 4/4/19   Historical Provider, MD   Insulin Pen Needle (B-D UF III MINI PEN NEEDLES) 31G X 5 MM MISC 1 each by Does not apply route 4 times daily 4/16/19   Lamar Staton MD   OXYGEN by Nasal route See Admin Instructions Indications: Oxygen Therapy    Historical Provider, MD   Scheduled Meds:   dextrose  25 g Intravenous Once    insulin lispro  0-18 Units Subcutaneous Q4H    IVPB builder  3.375 g Intravenous Q12H    insulin glargine  30 Units Subcutaneous BID    sodium chloride flush  10 mL Intravenous 2 times per day    aspirin  81 mg Oral Daily    atorvastatin  40 mg Oral Nightly    levetiracetam  500 mg Intravenous Q24H    calcium replacement protocol   Other RX Placeholder    chlorhexidine  15 mL Mouth/Throat BID    pantoprazole  40 mg Intravenous Daily     Continuous Infusions:   dextrose      vasopressin (Septic Shock) infusion 0.03 Units/min (11/22/20 1200)    prismaSATE BGK 4/2.5      prismaSol BGK 2/3.5      prismaSol BGK 2/3.5      propofol 20 mcg/kg/min (11/22/20 6252)    Amiodarone 0.5 mg/min (11/22/20 8746)    midazolam 3 mg/hr (11/21/20 4914)     PRN Meds:.glucose, dextrose, glucagon (rDNA), dextrose, sodium chloride flush, acetaminophen **OR** acetaminophen, polyethylene glycol, promethazine **OR** ondansetron, perflutren lipid microspheres    No Known Allergies  Family History   Problem Relation Age of Onset    Diabetes Father     Diabetes Sister     Diabetes Brother Social History     Socioeconomic History    Marital status:      Spouse name: Sharie Bernheim Number of children: 2    Years of education: Not on file    Highest education level: Not on file   Occupational History    Not on file   Social Needs    Financial resource strain: Not on file    Food insecurity     Worry: Not on file     Inability: Not on file   [employer] needs     Medical: Not on file     Non-medical: Not on file   Tobacco Use    Smoking status: Former Smoker     Years: 20.00     Types: Pipe, Cigars     Last attempt to quit: 1993     Years since quittin.7    Smokeless tobacco: Former User     Types: Chew   Substance and Sexual Activity    Alcohol use: No    Drug use: No    Sexual activity: Not Currently   Lifestyle    Physical activity     Days per week: Not on file     Minutes per session: Not on file    Stress: Not on file   Relationships    Social connections     Talks on phone: Not on file     Gets together: Not on file     Attends Advent service: Not on file     Active member of club or organization: Not on file     Attends meetings of clubs or organizations: Not on file     Relationship status: Not on file    Intimate partner violence     Fear of current or ex partner: Not on file     Emotionally abused: Not on file     Physically abused: Not on file     Forced sexual activity: Not on file   Other Topics Concern    Not on file   Social History Narrative    Not on file     ROS:   Constitutional: Denies any recent wt change. Eyes:  Denies any blurring or double vision, no glaucoma  Ears/Nose/Mouth/Throat:  Denies any chronic sinus/rhinitis, bleeding gums  Cardiovascular:  As described above.     Respiratory:  Denies any frequent cough, wheezing or coughing up blood  Genitourinary:  Denies difficulty with urination and kidney stones  Gastrointestinal:  Denies any chronic problems with abdominal pain, nausea, vomiting or diarrhea  Musculoskeletal:  Denies any joint pain, back pain, or difficulty walking  Integumentary:  Denies any rash  Neurological:  No numbness or tingling  Endocrine:  Denies any polydipsia. Hematologic/Lymphatic:  Denies any hemorrhage or lymphatic drainage problems. Labs:  CBC:   Recent Labs     11/21/20  2250 11/22/20  0710 11/22/20  1155   WBC 19.0* 17.4* 15.6*   HGB 10.4* 9.8* 9.3*   HCT 34.3* 32.5* 30.6*   MCV 90.0 90.3 90.0    193 174     BMP:   Recent Labs     11/21/20  1750 11/22/20  0115 11/22/20  0710    136 136   K 4.2 5.6* 5.3*   CL 95* 91* 92*   CO2 27 26 28   PHOS 3.8 4.3 3.6   BUN 35* 43* 46*   CREATININE 6.0* 6.9* 7.5*   MG 1.8 1.9 2.0     Accucheck Glucoses:   Recent Labs     11/21/20  1758 11/21/20  2136 11/22/20  0141 11/22/20  0403 11/22/20  1153   POCGLU 204* 256* 305* 325* 283*     Cardiac Enzymes: No results for input(s): CKTOTAL, CKMB, CKMBINDEX, TROPONINI in the last 72 hours. PT/INR:   Recent Labs     11/21/20  1411 11/22/20  0710   INR 2.39* 3.02*     APTT: No results for input(s): APTT in the last 72 hours.   Liver Profile:  Lab Results   Component Value Date    AST 69 11/22/2020    ALT 70 11/22/2020    BILIDIR <0.2 11/22/2020    BILITOT 0.5 11/22/2020    ALKPHOS 178 11/22/2020     Lab Results   Component Value Date    CHOL 117 04/01/2017    HDL 49 04/01/2017    TRIG 93 04/01/2017     TSH:   Lab Results   Component Value Date    TSH 1.830 02/04/2020     UA:   Lab Results   Component Value Date    COLORU Yellow 09/20/2016    PHUR 5.0 09/11/2016    LABCAST NONE SEEN 10/02/2015    LABCAST NONE SEEN 10/02/2015    WBCUA 0-5 09/20/2016    RBCUA 21-50 09/20/2016    MUCUS Present 09/20/2016    YEAST NONE SEEN 09/11/2016    BACTERIA Trace 09/20/2016    CLARITYU Clear 10/13/2015    SPECGRAV 1.010 10/13/2015    LEUKOCYTESUR Negative 09/20/2016    LEUKOCYTESUR SMALL 09/11/2016    UROBILINOGEN <2.0 E.U./dL 09/20/2016    BILIRUBINUR Negative 09/20/2016    BLOODU LARGE 09/11/2016    GLUCOSEU Negative 09/20/2016 AMORPHOUS Present 09/20/2016         Physical Exam:  Vitals:    11/22/20 0935   BP:    Pulse: 72   Resp: 26   Temp:    SpO2: 100%        Intake/Output Summary (Last 24 hours) at 11/22/2020 1222  Last data filed at 11/22/2020 2599  Gross per 24 hour   Intake 2476 ml   Output 3400 ml   Net -924 ml      General:  Unresponsive  Neck: Supple, no JVD, no carotid bruits  Heart: Regular rhythm normal S1 and S2, no rubs, murmurs or gallops  Lungs: Mild crackles  Abdomen: positive bowel sounds, soft, non-tender, non-distended, no bruits, no masses  Extremities:no clubbing, cyanosis or edema  Neurologic: Unresponsive, pupils non-reactive  Skin: No rashes    Assessment:  Cardiac Arrest - likely hyperkalemic s/p potassium management  [place] PM - V paced  COPD  CAD  s/p SAVR  Afib  DM II  KELSEY  ESRD on dialysis. Plan:  1. At this point, he is non-responsive, will need to wait for Neurologic assessment   2. Not currently cooled due to access issues  3. EEG  4. May need to increase proprofol and possibly add more AED per Neurology  5. TTE if there is neurologic recovery  6. Palliative care consult  7. Further recommendations based on results and clinical course    Thank you for allowing us to participate in the care of this patient. Please do not hesitate to call us with questions. Greater than 120 minutes of time was spent managing critical issues, discussing with the family, and coordinating care.         Electronically signed by Kayla Hardy MD on 11/22/2020 at 12:22 PM    Interventional Cardiology - [facility]

## 2020-11-22 NOTE — PROGRESS NOTES
28   BUN 35* 43* 46*   CREATININE 6.0* 6.9* 7.5*   GLUCOSE 227* 330* 300*   CALCIUM 9.6 9.6 9.9   MG 1.8 1.9 2.0   PHOS 3.8 4.3 3.6     Hepatic:   Recent Labs     11/21/20  0950 11/22/20  0710   LABALBU 3.7 4.0   AST 21 69*   ALT 18 70*   BILITOT 0.3 0.5   ALKPHOS 202* 178*         Meds:  Infusion:    dextrose      vasopressin (Septic Shock) infusion 0.03 Units/min (11/22/20 1200)    prismaSATE BGK 4/2.5      prismaSol BGK 2/3.5      prismaSol BGK 2/3.5      propofol 20 mcg/kg/min (11/22/20 0939)    Amiodarone 0.5 mg/min (11/22/20 4959)    midazolam 3 mg/hr (11/21/20 3033)     Meds:    dextrose  25 g Intravenous Once    insulin lispro  0-18 Units Subcutaneous Q4H    IVPB builder  3.375 g Intravenous Q12H    insulin glargine  30 Units Subcutaneous BID    sodium chloride flush  10 mL Intravenous 2 times per day    aspirin  81 mg Oral Daily    atorvastatin  40 mg Oral Nightly    levetiracetam  500 mg Intravenous Q24H    calcium replacement protocol   Other RX Placeholder    chlorhexidine  15 mL Mouth/Throat BID    pantoprazole  40 mg Intravenous Daily     Meds prn: glucose, dextrose, glucagon (rDNA), dextrose, sodium chloride flush, acetaminophen **OR** acetaminophen, polyethylene glycol, promethazine **OR** ondansetron, perflutren lipid microspheres       Impression and Plan:  1. End-stage renal disease on hemodialysis  Patient is hemodynamically not stable. He is requiring 2 pressors. Blood pressure is borderline normal, Map is 61  Doubt he will tolerate conventional hemodialysis  Will make arrangements for CVVH  Orders placed  Discussed with intensivist    2. Hyperkalemia, recurrent. Will start patient on CVVH  3.  Status post cardiac arrest  4. Chronic atrial fibrillation  5. Diabetes mellitus: With hyperglycemia  6. History of aortic valve replacement  7. Morbid obesity  8. Neuro:  Await neurological assessment, EEG to be completed today    D/W intensivist and RN  Critically ill    Jonathon Mckeon MD  [facility]

## 2020-11-23 NOTE — PROCEDURES
[address] [place] [address]                          ELECTROENCEPHALOGRAM REPORT    PATIENT NAME: Cl Loya                       :        1934  MED REC NO:   [id number]                           ROOM:       0011  ACCOUNT NO:   [de-identified]                           ADMIT DATE: 2020  PROVIDER:     Carlos Stone. Litzy Hoskins MD    DATE OF EE2020    REFERRING PROVIDER:  Carlos Stone. Litzy Hoskins MD    CLINICAL HISTORY:  An 49-year-old male, status post cardiopulmonary  arrest with prolonged resuscitation. The patient is now on the  ventilator, having jerking movements occasionally. The patient is on  propofol, Versed, dextrose, insulin, aspirin, atorvastatin, Keppra,  chlorhexidine, pantoprazole, vasopressin, amiodarone, midazolam.    CLINICAL INTERPRETATION:  This is a 17-channel EEG performed without  sleep deprivation. Hyperventilation was not performed. Photic  stimulation was not performed. The patient is described as comatose. The background is noted to be slowed, poorly organized. Periodic  bifrontal sharp waves were noted with disorganized background. No  clinical seizures were observed. There was no satisfactory background  rhythm reached. Photic stimulation was not performed. The patient was  noted to be sedated throughout the study. IMPRESSION:  This is an abnormal EEG due to the presence of bifrontal  sharp waves with slowed, disorganized background suggestive of anoxic  myoclonus. No clinical seizures were observed. Results were relayed to  the clinical service upon completion of the study.         Mich Rice MD    D: 2020 12:58:16       T: 2020 13:03:29     [email or web address]  Job#: [id number]     Doc#: [id number]    CC:

## 2020-11-23 NOTE — PROGRESS NOTES
CRITICAL CARE PROGRESS NOTE      Patient:  Alanna Castaneda    Unit/Bed:4D-11/011-A  YOB: 1934  MRN: [id number]   PCP: Sharon Goodwin MD  Date of Admission: 11/21/2020  Chief Complaint:-Cardiac arrest    Assessment and Plan:    Acute on chronic hypoxic/hypercapnic respiratory failure: Patient wears nasal cannula at baseline at home.  Patient intubated in the emergency department. - Maintain peak pressure less than 35 and plateau pressure less than 30.  - Wean sedation and ventilation as tolerated    Cardiac arrest: 7-minute known code in the emergency department. Hannah Thomas how long patient was pulseless prior to arrival to the emergency department. [facility] was obtained. Rita Yung will not undergo TTM due to limited access, Dr. Aaliyah Jefferson updated family on decision. Anoxic brain injury: EEG pending, concern for myoclonus. Neurology is following. Repeat EEG positive for myoclonus    Acute hyperkalemia: Potassium noted in the emergency department at 10.1, patient had noted treatment with medications for hyperkalemia.  Emergent dialysis has been started. - Nephrology consulted; follow recommendations for CRRT    End-stage renal disease on dialysis: Dialysis initiated.  Nephrology consulted. Paroxysmal atrial fib: Amiodarone drip initiated in the emergency department. Coumadin held due to concern for pleural hemorrhage. Suresh Musa noted at 3.02 on 11/22/2020.  - Initiate heparin drip once INR is < 1.5    IDDM2: Last A1c 11/19 noted at 7.1.  - Lantus 30u twice daily  - Sliding scale high dose  - Hypoglycemia protocol  - PCT glucose    Bioprosthetic valve: History of aortic valve replacement. - Coumadin held secondary to concern for pleural hemorrhage.  - Initiate heparin drip once INR is less than 1.5.     INITIAL H AND P AND ICU COURSE:  Alanna Castaneda is an 80 y.o. male who presented to [facility] on 11/21/2020 in cardiac arrest. Deya Shaan has a PMHx of prostate enlargement, pacemaker, hypoxic anticoagulation. Patient's prognosis is poor. Past Medical History:  Per HPI. Family History:  Father - DM, Brother - DM, Sister - DM. Social History: Former smoker quit in 1993 with 20 pack year, denies ETOH and illicit drug use. Review of Systems   Unable to perform ROS: Intubated        Scheduled Meds:   dextrose  25 g Intravenous Once    insulin lispro  0-18 Units Subcutaneous Q4H    IVPB builder  3.375 g Intravenous Q12H    insulin glargine  30 Units Subcutaneous BID    sodium chloride flush  10 mL Intravenous 2 times per day    aspirin  81 mg Oral Daily    atorvastatin  40 mg Oral Nightly    levetiracetam  500 mg Intravenous Q24H    calcium replacement protocol   Other RX Placeholder    chlorhexidine  15 mL Mouth/Throat BID    pantoprazole  40 mg Intravenous Daily     Continuous Infusions:   phenylephrine (ANDREA-SYNEPHRINE) 50mg/250mL infusion      dextrose      vasopressin (Septic Shock) infusion 0.04 Units/min (11/23/20 0815)    prismaSATE BGK 4/2.5 2,500 mL/hr (11/23/20 0708)    prismaSol BGK 2/3.5 500 mL/hr (11/23/20 0235)    prismaSol BGK 2/3.5 1,000 mL/hr (11/23/20 0807)    norepinephrine 50 mcg/min (11/23/20 0453)    propofol 35 mcg/kg/min (11/23/20 0814)    Amiodarone 0.5 mg/min (11/23/20 0457)    midazolam 5 mg/hr (11/23/20 0820)       PHYSICAL EXAMINATION:  Vitals:  Temp: 98.4 °F (36.9 °C)  Pulse: 71  Resp: (!) 33  BP: (!) 143/49  FiO2 : 30 %(weaned due to high spo2)  SpO2: 93 %  [place] Coma Scale  Eye Opening: None  Best Verbal Response: None(OLIVIER; ETT)  Best Motor Response: None  [place] Coma Scale Score: 3      Input/Output  In: 2029.4   Out: 2398.8     BMI:  Body mass index is 60.13 kg/m².      Oxygenation/Ventilation  Vent Information  Vent Mode: PCV+  Rate Set: 12 bmp  FiO2 : 30 %(weaned due to high spo2)  PEEP/CPAP: 6  Pressure Ordered: 35(Pcontrol 29)     Vent Patient Data  Peak Inspiratory Pressure: 34 cmH2O  Mean Airway Pressure: 11 cmH20  Rate Measured: 12 br/min  Vt Exhaled: 482 mL  Minute Volume: 5.6 Liters  I:E Ratio: 1:4      General: Ill-appearing, sedated, intubated, mechanically ventilated, morbidly obese  HEENT: Normocephalic and atraumatic. No scleral icterus. Neck: Supple. No Thyromegaly. Lungs: clear to auscultation. No retractions  Cardiac: RRR. No JVD. Abdomen: Soft. Nontender. Extremities:  No clubbing, cyanosis, or edema x 4. Vasculature: Capillary refill < 3 seconds. Palpable dorsalis pedis pulses. Skin: Warm and dry. Psych: Sedated intubated. Affect appropriate  Lymph: No supraclavicular adenopathy. Neurologic: Sedated and intubated. No seizures. Without pupillary reflex, gag, or cough. Data: (All radiographs, tracings, PFTs, and imaging are personally viewed and interpreted unless otherwise noted).  Sodium 133, potassium 5.1, chloride 94, CO2 26, BUN 29, creatinine 4.5, anion gap 13, glucose 233   WBC 18.5, hemoglobin 9.4, hematocrit 32.4, platelet count 995   Telemetry shows ventricularly paced   Cardiogram 8/22/2018 reports ejection fraction 50%, mild global hypokinesis. Noted bioprosthetic valve in aortic position.  11/21/2020 CXR-bilateral pleural effusions and increased pulmonary vascularity suggestive of CHF. There is widening of the pleural space at the level which could partly be projectional but also may represent associated pleural hemorrhage   11/21/2020 CT head without contrast - No acute intracranial disease   11/21/2020 CT chest without contrast -small right pleural effusion, right greater than left pleural calcification, patchy airspace and groundglass as well as reticular lung opacities right greater than left. Left anterior fifth rib fracture, via sternotomy, herniation of fat from the anterior mediastinum into the anterior chest wall through the sternotomy defect measuring up to 6.6 cm.    11/21/2020 CT abdomen pelvis without contrast -moderate cholelithiasis, mild bilateral renal atrophy, moderate

## 2020-11-23 NOTE — FLOWSHEET NOTE
11/23/20 1453   Encounter Summary   Services provided to: Family   Referral/Consult From: Family   Support System Spouse; Children   [facility]   ([address])   Continue Visiting Yes  (11/23 NR)   Complexity of Encounter Moderate   Length of Encounter 30 minutes   Routine   Type Follow up   Assessment Tearful; Anxious   Intervention Nurtured hope;Prayer   During my contact with the 80 yr old patient, he was unresponsive and the pt's wife was anxious and tearful. The pt's wife shared that she places everything in God's hands. The pt went in to cardiac arrest on 11/21. The Pt's wife has children and grandchildren that have supported her. I offered emotional support and prayer. Plan: Continued support would be helpful.

## 2020-11-23 NOTE — PLAN OF CARE
Problem: Respiratory:  Goal: Will be able to breathe spontaneously, without ventilator support  Description: Will be able to breathe spontaneously, without ventilator support  Outcome: Ongoing     Pt still requiring ventilatory support= will continue with current plan of care.

## 2020-11-23 NOTE — PROGRESS NOTES
Nutrition Note:  Due to change in medical/code status, dietitian will sign off. Discussed on rounds and may WD treatment after family meeting so EN consult has been discontinued. If nutrition intervention is required, please submit a dietitian consult.   Electronically signed by Rosalva Fink RD, LEANNA on 11/23/2020 at 4:10 PM

## 2020-11-23 NOTE — PROGRESS NOTES
[facility] Laboratory Technician Worksheet      EEG Date: 2020    Name: Hector Coronado   : 1934   Age: 80 y.o. SEX: male    ROOM: 11 MRN: [id number]           CSN: [id number]      Ordering Provider: stephanie  EEG Number: [id number] Time of Test:  8404    Hand: Right   Sedation: yes - propofol versed runnimg    H.V. Done: No on vent Photic: No    Sleep: Yes  Drowsy: No   Sleep Deprived: No    Seizures observed: no    Mentality: comatose      Clinical History:code while driving, cpr 7 minutes. Repeat eeg for myoclonus.     Past Medical History:       Diagnosis Date    Acute on chronic combined systolic and diastolic congestive heart failure (HCC)     Aortic stenosis     Atrial fibrillation ([facility] 2017    Atrial flutter ([facility] 2017    COPD (chronic obstructive pulmonary disease) ([facility])     Coronary artery disease     DM (diabetes mellitus), type 2 with renal complications ([facility])     DVT (deep venous thrombosis) ([facility]     Hemodialysis patient ([facility] 10/22/2016    with [facility]    Hyperlipidemia     Hypertension     Morbid obesity with BMI of 50.0-59.9, adult ([facility]     Obstructive sleep apnea     On home oxygen therapy     KELSEY treated with BiPAP     Osteoarthritis     Pacemaker     [place] dual pacer    Prostate enlargement        Scheduled Meds:   dextrose  25 g Intravenous Once    insulin lispro  0-18 Units Subcutaneous Q4H    IVPB builder  3.375 g Intravenous Q12H    insulin glargine  30 Units Subcutaneous BID    sodium chloride flush  10 mL Intravenous 2 times per day    aspirin  81 mg Oral Daily    atorvastatin  40 mg Oral Nightly    levetiracetam  500 mg Intravenous Q24H    calcium replacement protocol   Other RX Placeholder    chlorhexidine  15 mL Mouth/Throat BID    pantoprazole  40 mg Intravenous Daily     Continuous Infusions:   phenylephrine (ANDREA-SYNEPHRINE) 50mg/250mL infusion 100 mcg/min (20 6119)    dextrose      vasopressin (Septic Shock) infusion 0.04 Units/min (11/23/20 0815)    prismaSATE BGK 4/2.5 2,500 mL/hr (11/23/20 0916)    prismaSol BGK 2/3.5 500 mL/hr (11/23/20 0235)    prismaSol BGK 2/3.5 1,000 mL/hr (11/23/20 0807)    norepinephrine 50 mcg/min (11/23/20 1030)    propofol 35 mcg/kg/min (11/23/20 1030)    Amiodarone 0.5 mg/min (11/23/20 0457)    midazolam 5 mg/hr (11/23/20 1031)     PRN Meds:.glucose, dextrose, glucagon (rDNA), dextrose, sodium chloride flush, acetaminophen **OR** acetaminophen, polyethylene glycol, promethazine **OR** ondansetron, perflutren lipid microspheres    Technician: Aleena Pope 11/23/2020

## 2020-11-23 NOTE — SIGNIFICANT EVENT
1:32 PM  Spoke with wife Noah Masters at bedside with granddaughter. The wife states that her daughter is on her way from [place] and would like us to continue care at this time however she is elected to change the patient's CODE STATUS to DNR CCA. The patient's wife has requested that we continue CRRT therapy with anticoagulation.  Dr. Venus Reynolds we will be contacted and notified of the request.    Electronically signed by Malena Puga DO on 11/23/2020 at 1:33 PM

## 2020-11-23 NOTE — PROGRESS NOTES
Renal Progress Note    Assessment and Plan:    1. End stage kidney disease on hemodialysis. 2.  Diabetes mellitus type 2  3. Respiratory failure on mechanical support  4. Status post cardiac arrest with successful resuscitation  5. Severe hyperkalemia resolved  6. Hyponatremia from end-stage kidney disease. 6.  Leukocytosis etiology unclear  7. Anemia of chronic disease with macrocytosis  8.   Hypotension requiring a pressor agent  PLAN:   Serial labs reviewed  Serum potassium remains normal  Medications reviewed  No changes  CRRT in progress  CRRT data reviewed  Doing well with the procedure  We will continue for now  Discussed with the staff  We will follow    Patient Active Problem List:     [address]     KELSEY on CPAP     Type 2 diabetes mellitus with stage 4 chronic kidney disease, with long-term current use of insulin (HCC)     Combined systolic and diastolic congestive heart failure ([address]     Coronary artery disease involving native coronary artery of native heart without angina pectoris     Anemia, chronic disease     Iron deficiency anemia     Vitamin D deficiency     Hyperlipidemia     Lesion of left native kidney     Essential hypertension     Acute respiratory failure with hypoxemia (HCC)     Prostate enlargement     ESRD (end stage renal disease) (HCC)     Cholelithiases     Hyperkalemia     Chronic bronchitis (HCC)     Anticoagulated on Coumadin     On home oxygen therapy     Morbid obesity with BMI of 50.0-59.9, adult ([address]     Hemodialysis patient ([address]     COPD (chronic obstructive pulmonary disease) (HCC)     Chronic respiratory failure with hypoxia and hypercapnia (HCC)     Anemia due to chronic kidney disease     Cardiopulmonary arrest ([address]     Anoxic brain injury ([address]      Subjective:   Admit Date: 11/21/2020    Interval History:   Seen for end stage kidney disease on hemodialysis  On mechanical support  Updated by the staff  No issues  Blood pressure remains low      Medications:   Scheduled Meds:   dextrose  25 g Intravenous Once    insulin lispro  0-18 Units Subcutaneous Q4H    IVPB builder  3.375 g Intravenous Q12H    insulin glargine  30 Units Subcutaneous BID    sodium chloride flush  10 mL Intravenous 2 times per day    aspirin  81 mg Oral Daily    atorvastatin  40 mg Oral Nightly    levetiracetam  500 mg Intravenous Q24H    calcium replacement protocol   Other RX Placeholder    chlorhexidine  15 mL Mouth/Throat BID    pantoprazole  40 mg Intravenous Daily     Continuous Infusions:   phenylephrine (ANDREA-SYNEPHRINE) 50mg/250mL infusion 100 mcg/min (11/23/20 0958)    dextrose      vasopressin (Septic Shock) infusion 0.04 Units/min (11/23/20 0815)    prismaSATE BGK 4/2.5 2,500 mL/hr (11/23/20 0916)    prismaSol BGK 2/3.5 500 mL/hr (11/23/20 0235)    prismaSol BGK 2/3.5 1,000 mL/hr (11/23/20 0807)    norepinephrine 50 mcg/min (11/23/20 1030)    propofol 35 mcg/kg/min (11/23/20 1030)    Amiodarone 0.5 mg/min (11/23/20 0457)    midazolam 5 mg/hr (11/23/20 1031)       CBC:   Recent Labs     11/22/20  1730 11/22/20 2231 11/23/20  0505   WBC 17.2* 17.2* 18.5*   HGB 9.6* 9.7* 9.5*    172 182     CMP:    Recent Labs     11/22/20  1730 11/22/20  2231 11/23/20  0505   * 132* 133*   K 4.6 4.6 5.1   CL 91* 93* 94*   CO2 28 26 26   BUN 41* 35* 29*   CREATININE 7.0* 5.9* 4.5*   GLUCOSE 226* 223* 233*   CALCIUM 9.3 9.5 9.4   LABGLOM 9* 11* 15*     Troponin: No results for input(s): TROPONINI in the last 72 hours. BNP: No results for input(s): BNP in the last 72 hours. INR:   Recent Labs     11/21/20  1411 11/22/20  0710   INR 2.39* 3.02*     Lipids:   Recent Labs     11/23/20  0505   AMYLASE 29   LIPASE 18.9     Liver:   Recent Labs     11/23/20  0505   AST 42*   ALT 53   ALKPHOS 177*   PROT 7.2   LABALBU 3.7   BILITOT 0.5     Iron:  No results for input(s): IRONS, FERRITIN in the last 72 hours.     Invalid input(s): LABIRONS  CT HEAD WO CONTRAST Final Result   No acute intracranial findings. No significant interval change. This document has been electronically signed by: Jennifer Medley MD on    11/22/2020 11:54 PM      All CT scans at this facility use dose modulation, iterative    reconstruction, and/or weight-based   dosing when appropriate to reduce radiation dose to as low as reasonably    achievable. XR CHEST PORTABLE   Final Result   No interval change since previous study dated 21 November 2020. **This report has been created using voice recognition software. It may contain minor errors which are inherent in voice recognition technology. **      Final report electronically signed by DR Julieta Sellers on 11/22/2020 9:46 AM      CT HEAD WO CONTRAST   Final Result   Impression:   No acute intracranial disease. This document has been electronically signed by: Michelle Peters MD on    11/21/2020 11:37 PM      All CT scans at this facility use dose modulation, iterative    reconstruction, and/or weight-based   dosing when appropriate to reduce radiation dose to as low as reasonably    achievable. CT CHEST WO CONTRAST   Final Result   Impression:   1. Mild cardiomegaly with small right pleural effusion unchanged   2. Right more than left pleural calcifications unchanged   3. Moderate patchy airspace and groundglass as well as reticular lung    opacities, right more than left. No change. These findings are    nonspecific; considerations would include sequela of tuberculosis   4. Left anterior fifth rib fracture, new   5. Previous sternotomy. Herniation of fat from the anterior mediastinum    into the anterior chest wall through the sternotomy defect measuring up to    6.6 cm in maximum transverse dimension.   Increased since previous      This document has been electronically signed by: Michelle Peters MD on    11/21/2020 09:28 PM      All CT scans at this facility use dose modulation, iterative    reconstruction, and/or weight-based   dosing when appropriate to reduce radiation dose to as low as reasonably    achievable. CT ABDOMEN PELVIS WO CONTRAST Additional Contrast? None   Final Result   Impression:   1. Moderate cholelithiasis, markedly increased      This document has been electronically signed by: Bryon Ribeiro MD on    11/21/2020 09:41 PM      All CT scans at this facility use dose modulation, iterative    reconstruction, and/or weight-based   dosing when appropriate to reduce radiation dose to as low as reasonably    achievable. XR CHEST PORTABLE   Final Result   NG tube is in the stomach. **This report has been created using voice recognition software. It may contain minor errors which are inherent in voice recognition technology. **      Final report electronically signed by Dr. Webster Given on 11/21/2020 7:11 PM      XR CHEST PORTABLE   Final Result   Central venous catheter tip is in the proximal superior vena cava. There is a further widening of the pleural space at this level which could partly be projectional but also may represent associated pleural hemorrhage. This was called to the    patient's nurse practitioner Yvette Davis 2:12 PM            **This report has been created using voice recognition software. It may contain minor errors which are inherent in voice recognition technology. **      Final report electronically signed by Dr. Webster Given on 11/21/2020 2:12 PM      XR CHEST PORTABLE   Final Result   1. Endotracheal tube with tip 4.5 cm above the elizabeth. 2. Cardiomegaly status post previous aortic valve replacement and pacemaker placement. 3. Bilateral pleural effusions or thickening and increased pulmonary vascularity suggestive of chronic congestive heart failure. .               **This report has been created using voice recognition software. It may contain minor errors which are inherent in voice recognition technology. **      Final report electronically signed by DR Fredy Chen on 11/21/2020 11:07 AM      XR ABDOMEN FOR NG/OG/NE TUBE PLACEMENT   Final Result   1. The tip of the enteric tube is at the nasogastric junction. **This report has been created using voice recognition software. It may contain minor errors which are inherent in voice recognition technology. **      Final report electronically signed by DR Fredy Chen on 11/21/2020 11:08 AM            Objective:   Vitals: BP (!) 163/40   Pulse 70   Temp 98.4 °F (36.9 °C)   Resp 17   Ht 5' 10\" (1.778 m)   Wt (!) 419 lb 1.5 oz (190.1 kg)   SpO2 97%   BMI 60.13 kg/m²    Wt Readings from Last 3 Encounters:   11/23/20 (!) 419 lb 1.5 oz (190.1 kg)   11/19/20 (!) 342 lb (155.1 kg)   08/11/20 (!) 342 lb (155.1 kg)      24HR INTAKE/OUTPUT:      Intake/Output Summary (Last 24 hours) at 11/23/2020 1239  Last data filed at 11/23/2020 1006  Gross per 24 hour   Intake 3397.35 ml   Output 2650.8 ml   Net 746.55 ml       Constitutional: Sedated on mechanical support  Skin:normal with no rash or lesions. HEENT:Pupils are reactive. Endotracheal tube is noted. Orogastric tube is noted. .Oral mucosa is moist   Neck:supple with no  carotid bruit  Cardiovascular: Sinus rhythm. Respiratory:  Clear to ausculation without wheezes, rhonchi or rales. Abdomen: Soft. Good bowel sounds.   Ext: No LE edema  Musculoskeletal:Intact  Neuro: Deferred      Electronically signed by Melani Espinoza MD on 11/23/2020 at 12:39 PM

## 2020-11-24 NOTE — PROGRESS NOTES
Wife and daughter here to visit- updated on condition and questions answered. 65- Dr Nova Starkey meeting with family to discuss code status. 1230- CODE status changed to [facility]   1233- CRRT stopped. 1237- Pt extubated to  Room air. All pressors off. 1239- Wife and daughter at bedside. 1246- No resp rate, no breath sounds. No heart tones, no BP.    1247- Pt pronounced  per Baldo Landin CNP.  1253- LOOP notified of death. 1312-  Death paperwork completed per Zack To RN. 18- Family leaving- personal belonging given to them. 1510- Messages sent to Dr Hazel Birmingham and Dr Zachary Myles regarding pt passing. 18- [facility]

## 2020-11-24 NOTE — PLAN OF CARE
Problem: Respiratory:  Goal: Will be able to breathe spontaneously, without ventilator support  Description: Will be able to breathe spontaneously, without ventilator support  11/24/2020 0806 by Sintia Lagunas RCP  Outcome: Ongoing  Note: Vent setting optimized to achieve target tidal volume, respiratory rate and ideal oxygen saturations. SBT will be performed when appropriate.

## 2020-11-24 NOTE — CONSULTS
NEUROLOGY INPATIENT PROGRESS NOTE    Darleen Sargent    MRN -  [id number]   Acct # - [de-identified]      - 1934    80 y.o. Subjective: The patient is seen as follow up for post cardiac arrest anoxic injury. Patient is unconscious and comatose at this time. His jerking movement stopped since propofol and versed added    Wife and grand daughter at the bedside. Objective:   BP (!) 142/25   Pulse 70   Temp 98.8 °F (37.1 °C) (Esophageal)   Resp 24   Ht 5' 10\" (1.778 m)   Wt (!) 419 lb 1.5 oz (190.1 kg)   SpO2 97%   BMI 60.13 kg/m²       Intake/Output Summary (Last 24 hours) at 2020  Last data filed at 2020  Gross per 24 hour   Intake 3427.23 ml   Output 2081 ml   Net 1346.23 ml       Physical Exam:  General: intubated on versed and propofol  Pupil 3mm sluggish, no occular vestibular reflexes, no gag, no pain withdrawal      ROS:    Cardiac: no chest pain. No palpitations.   Renal : no flank pain, no hematuria  Skin: no rash    Medications:     lacosamide (VIMPAT) IVPB  100 mg Intravenous BID    dextrose  25 g Intravenous Once    insulin lispro  0-18 Units Subcutaneous Q4H    IVPB builder  3.375 g Intravenous Q12H    insulin glargine  30 Units Subcutaneous BID    sodium chloride flush  10 mL Intravenous 2 times per day    aspirin  81 mg Oral Daily    atorvastatin  40 mg Oral Nightly    levetiracetam  500 mg Intravenous Q24H    calcium replacement protocol   Other RX Placeholder    chlorhexidine  15 mL Mouth/Throat BID    pantoprazole  40 mg Intravenous Daily       Data:   CBC:   Recent Labs     20  0505 20  1415   WBC 17.2* 18.5* 16.3*   HGB 9.7* 9.5* 9.1*    182 163     BMP:    Recent Labs     20  0505 20  1415   * 133* 131*   K 4.6 5.1 5.6*   CL 93* 94* 94*   CO2 26 26 25   BUN 35* 29* 29*   CREATININE 5.9* 4.5* 4.7*   GLUCOSE 223* 233* 292*         No results found for this or any previous visit. No results found for this or any previous visit. No results found for this or any previous visit. No results found for this or any previous visit. No results found for this or any previous visit. No results found for this or any previous visit. No results found for this or any previous visit. Results for orders placed during the hospital encounter of 11/21/20   CT HEAD WO CONTRAST    Narrative CT head without contrast    Comparison:  CT,SR  - CT HEAD WO CONTRAST  - 11/21/2020 08:44 PM EST    Findings:  No intracranial mass, midline shift, hydrocephalus, or acute hemorrhage. Stable mild periventricular white matter hypoattenuation may relate to   chronic small vessel ischemic changes. No significant atrophy-like change. Stable old lacunar infarct in the left caudate. Mild mucosal thickening in the ethmoid air cells, bilateral maxillary   sinuses and sphenoid sinuses. Partial opacification of the bilateral mastoid air cells inferiorly. The orbits are unremarkable. No skull fracture. Impression No acute intracranial findings. No significant interval change. This document has been electronically signed by: Citlali Moore MD on   11/22/2020 11:54 PM    All CT scans at this facility use dose modulation, iterative   reconstruction, and/or weight-based  dosing when appropriate to reduce radiation dose to as low as reasonably   achievable.            Assessment:  Principal Problem:    Cardiopulmonary arrest ([address]  Active Problems:    Acute respiratory failure with hypoxemia ([facility])    ST THUY SINGLE PACEMAKER    KELSEY on CPAP    Type 2 diabetes mellitus with stage 4 chronic kidney disease, with long-term current use of insulin (HCC)    Combined systolic and diastolic congestive heart failure (HCC)    Essential hypertension    ESRD (end stage renal disease) (HCC)    Hyperkalemia    Morbid obesity with BMI of 50.0-59.9, adult (HCC)    COPD (chronic obstructive pulmonary disease) ([facility]    Anoxic brain injury ([facility] [address]  Resolved Problems:    * No resolved hospital problems. *      80 year old man with ESRD on HD, morbidly obese, pacemaker, DM, COPD, now post cardiac arrest for unknown time and myoclonic jerks. Plan:    1. Patient's EEG showed very suppressed activities due to his propofol and versed, there is sign of myoclonic jerks  2. Given his age and many comorbidities, his prognosis is grave, I discussed with his wife about quality of life today, wife has already made a decision with family this is not the life he wants and will go full comfort care when his daughter arrives from [place]. 3. con't keppra 500mg BID, this is max dose his kidney state  4. Add vimpat 100mg BID and load with 200mg IV  5. D/c versed  6. con't propofol to suppress his jerks so that he looks comfortable  7. Consult palliative.       Cindi Rooney MD, 11/23/2020 9:19 PM     Rosalina Pruitt MD  Attending Neurologist/Neurointensivist

## 2020-11-24 NOTE — FLOWSHEET NOTE
11/24/20 1200   Encounter Summary   Services provided to: Family   Referral/Consult From: Nurse   Support System Spouse; Children   Continue Visiting Yes  (11/24 NR)   Complexity of Encounter Moderate   Length of Encounter 30 minutes   Routine   Type Follow up   Assessment Grieving   Intervention Active listening;Prayer   Outcome Comfort   During my follow up encounter with the pt's spouse Tyler Seaman) and her family, they were tearfully at the pt's bedside. The pt's wife shared memories of their 61 years of marriage. She knows and accepts the fact that her  is not going to live long. Her tee in God is very strong. I offered emotional support, prayer and words of comfort. Plan: Continued support would be helpful per request of the family.

## 2020-11-24 NOTE — SIGNIFICANT EVENT
Significant Event    Name: Juany Meng  MRN: [id number]  : 1934  Date: 2020    Extensive discussion was had with the patient's wife, Audra Chaudhry, and family. The patient's wife has request a change in CODE STATUS to [facility] at this time.     Electronically signed by Meli Story DO on 2020 at 12:21 PM

## 2020-11-24 NOTE — PLAN OF CARE
Problem: Respiratory:  Goal: Will be able to breathe spontaneously, without ventilator support  Description: Will be able to breathe spontaneously, without ventilator support  11/24/2020 0533 by Yevgeniy Landin RCP  Outcome: Ongoing  Note: Vent setting optimized to achieve target tidal volume, respiratory rate and ideal oxygen saturations. Patient is currently on 35/6 Rate of 12 and 30%. Will continue to wean as patient tolerates. SBT will be performed when appropriate.

## 2020-11-24 NOTE — PROGRESS NOTES
Renal Progress Note    Assessment and Plan: 1. End stage kidney disease on hemodialysis   2. DM 2  3. Hypotension needing multiple pressors   4. Hyponatremia   5. Respiratory failure   6. Leukocytosis   7. Anemia of chronic disease  8. S/P cardiac arrest  PLAN:  Serial labs reviewed. CRRT data reviewed. Electrolyte replacement per protocol. Medications reviewed. No changes. Prognosis is poor. Discussed with staff. We will follow. Patient Active Problem List:     ST THUY SINGLE PACEMAKER     KELSEY on CPAP     Type 2 diabetes mellitus with stage 4 chronic kidney disease, with long-term current use of insulin (HCC)     Combined systolic and diastolic congestive heart failure (HCC)     Coronary artery disease involving native coronary artery of native heart without angina pectoris     Anemia, chronic disease     Iron deficiency anemia     Vitamin D deficiency     Hyperlipidemia     Lesion of left native kidney     Essential hypertension     Acute respiratory failure with hypoxemia (HCC)     Prostate enlargement     ESRD (end stage renal disease) (HCC)     Cholelithiases     Hyperkalemia     Chronic bronchitis (HCC)     Anticoagulated on Coumadin     On home oxygen therapy     Morbid obesity with BMI of 50.0-59.9, adult ([facility]     Hemodialysis patient ([facility]     COPD (chronic obstructive pulmonary disease) (HCC)     Chronic respiratory failure with hypoxia and hypercapnia (HCC)     Anemia due to chronic kidney disease     Cardiopulmonary arrest ([facility]     Anoxic brain injury ([facility]      Subjective:   Admit Date: 11/21/2020    Interval History:   Seen for end-stage kidney disease. Remains on mechanical support. Updated by the staff. No new issues. Blood pressure remains low. Now on 3 pressors.         Medications:   Scheduled Meds:   lacosamide (VIMPAT) IVPB  100 mg Intravenous BID    dextrose  25 g Intravenous Once    insulin lispro  0-18 Units Subcutaneous Q4H    IVPB builder  3.375 g Intravenous Q12H    MD on    11/22/2020 11:54 PM      All CT scans at this facility use dose modulation, iterative    reconstruction, and/or weight-based   dosing when appropriate to reduce radiation dose to as low as reasonably    achievable. XR CHEST PORTABLE   Final Result   No interval change since previous study dated 21 November 2020. **This report has been created using voice recognition software. It may contain minor errors which are inherent in voice recognition technology. **      Final report electronically signed by DR Darlin Sanders on 11/22/2020 9:46 AM      CT HEAD WO CONTRAST   Final Result   Impression:   No acute intracranial disease. This document has been electronically signed by: Lamar Santiago MD on    11/21/2020 11:37 PM      All CT scans at this facility use dose modulation, iterative    reconstruction, and/or weight-based   dosing when appropriate to reduce radiation dose to as low as reasonably    achievable. CT CHEST WO CONTRAST   Final Result   Impression:   1. Mild cardiomegaly with small right pleural effusion unchanged   2. Right more than left pleural calcifications unchanged   3. Moderate patchy airspace and groundglass as well as reticular lung    opacities, right more than left. No change. These findings are    nonspecific; considerations would include sequela of tuberculosis   4. Left anterior fifth rib fracture, new   5. Previous sternotomy. Herniation of fat from the anterior mediastinum    into the anterior chest wall through the sternotomy defect measuring up to    6.6 cm in maximum transverse dimension. Increased since previous      This document has been electronically signed by: Lamar Santiago MD on    11/21/2020 09:28 PM      All CT scans at this facility use dose modulation, iterative    reconstruction, and/or weight-based   dosing when appropriate to reduce radiation dose to as low as reasonably    achievable.          CT ABDOMEN PELVIS WO CONTRAST nasogastric junction. **This report has been created using voice recognition software. It may contain minor errors which are inherent in voice recognition technology. **      Final report electronically signed by DR Julieta Sellers on 11/21/2020 11:08 AM            Objective:   Vitals: BP (!) 162/44   Pulse 71   Temp 98.8 °F (37.1 °C)   Resp 14   Ht 5' 10\" (1.778 m)   Wt (!) 419 lb 5 oz (190.2 kg)   SpO2 96%   BMI 60.17 kg/m²    Wt Readings from Last 3 Encounters:   11/24/20 (!) 419 lb 5 oz (190.2 kg)   11/19/20 (!) 342 lb (155.1 kg)   08/11/20 (!) 342 lb (155.1 kg)      24HR INTAKE/OUTPUT:      Intake/Output Summary (Last 24 hours) at 11/24/2020 0825  Last data filed at 11/24/2020 0804  Gross per 24 hour   Intake 3848.24 ml   Output 1953 ml   Net 1895.24 ml       Constitutional: Critically ill gentleman on mechanical support. Skin:normal with no rash or lesions. *  HEENT:Pupils small, pinpoint and nonreactive. Endotracheal tube is noted. Orogastric tube is noted. .Oral mucosa is moist   Neck:supple with no carotid bruit  Cardiovascular:  S1, S2 without murmur or rubs   Respiratory:  Clear to ausculation in the anterior. Abdomen: Soft. Good bowel sounds.   Ext: No LE edema  Musculoskeletal:Intact  Neuro: Deferred      Electronically signed by Linda Alamo MD on 11/24/2020 at 8:25 AM

## 2020-11-24 NOTE — PLAN OF CARE
Problem: Respiratory:  Goal: Will be able to breathe spontaneously, without ventilator support  Description: Will be able to breathe spontaneously, without ventilator support  11/24/2020 1237 by Sintia Lagunas RCP  Outcome: Completed  Note: Pt extubated to room air. Family at bedside.
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
## 2020-11-24 NOTE — FLOWSHEET NOTE
11/24/20 2430   Encounter Summary   Services provided to: Family   Referral/Consult From: Nurse;Family   Support System Spouse; Children   Continue Visiting No  (11/24)   Complexity of Encounter Moderate   Length of Encounter 30 minutes   Grief and Life Adjustment   Type Death   Assessment Tearful   Intervention Prayer   Outcome Did not respond   At the time of death the family was tearfully present. I provided prayer and emotional support to the bereaving family. As I prayed the Lords Prayer the family joined me. The pts family expressed their thanks for the spiritual support. Plan:   No further support is needed at this time.

## 2020-11-24 NOTE — DISCHARGE SUMMARY
DEATH SUMMARY      Patient:  Juany Chin  YOB: 1934  MRN: [id number]   Acct: [de-identified]    Primary Care Physician: Junior Dow MD    Admit date:  11/21/2020    Discharge date:  11/24/2020    Admission Condition: serious    Discharge Diagnoses:   Cardiopulmonary arrest [facility])  Principal Problem:    Cardiopulmonary arrest ([facility] [address]  Active Problems:    Acute respiratory failure with hypoxemia ([facility] [address]    [address]    KELSEY on CPAP    Type 2 diabetes mellitus with stage 4 chronic kidney disease, with long-term current use of insulin (HCC)    Combined systolic and diastolic congestive heart failure (HCC)    Essential hypertension    ESRD (end stage renal disease) ([facility])    Hyperkalemia    Morbid obesity with BMI of 50.0-59.9, adult (HCC)    COPD (chronic obstructive pulmonary disease) ([facility])    Anoxic brain injury ([facility] [address]  Resolved Problems:    * No resolved hospital problems. *      Consultations:-  [] NONE [x] Cardiology  [x] Nephrology  [] Hemo onco   [] GI   [] ID  [] Endocrine  [] Pulm    [x] Neuro    [] Psych   [] Urology  [] ENT   [] G SURGERY   []Ortho    []CV surg    [] Palliative  [] Hospice [] Pain management   []    []TCU   [] PT/OT  OTHERS:-    Significant Diagnostic Studies:    Ct Abdomen Pelvis Wo Contrast Additional Contrast? None    Result Date: 11/21/2020  CT Abdomen Pelvis WITHOUT IV contrast, with multiplanar reconstructions Comparison:  CT,KO  - CT ABDOMEN /T/ PELVIS WITH CONTRAST  - 11/08/2016 11:55 AM EST Findings: Transesophageal tube tip in stomach. Radford catheter. . Liver unremarkable. Marked cholelithiasis. Moderate pancreatic atrophy. Cirrhosis. Calcific granulomata in the spleen Adrenals unremarkable. 3.1 cm intermediate density lesion lateral upper pole left kidney; no change. Moderate bilateral perinephric stranding. Mild bilateral renal atrophy. Scattered bilateral renal cysts. No hydronephrosis No ureteral lithiasis. No bladder calculi. Severe prostate enlargement, nonspecific. . No bowel dilation, bowel wall thickening or pneumatosis. Appendix unremarkable as visualized. No significant abdominal wall hernia. No adenopathy. No aortic aneurysm. No free air. No significant free fluid. No acute fracture. Coarsened trabeculae in pelvis on the left and in L5 vertebral body altogether consistent with Paget's disease; no change Moderate spondylosis and disc disease. L2-3 through L4-5 spinal stenosis. Impression: 1. Moderate cholelithiasis, markedly increased This document has been electronically signed by: Naga Noel MD on 11/21/2020 09:41 PM All CT scans at this facility use dose modulation, iterative reconstruction, and/or weight-based dosing when appropriate to reduce radiation dose to as low as reasonably achievable. Ct Head Wo Contrast    Result Date: 11/22/2020  CT head without contrast Comparison:  CT,SR  - CT HEAD WO CONTRAST  - 11/21/2020 08:44 PM EST Findings: No intracranial mass, midline shift, hydrocephalus, or acute hemorrhage. Stable mild periventricular white matter hypoattenuation may relate to chronic small vessel ischemic changes. No significant atrophy-like change. Stable old lacunar infarct in the left caudate. Mild mucosal thickening in the ethmoid air cells, bilateral maxillary sinuses and sphenoid sinuses. Partial opacification of the bilateral mastoid air cells inferiorly. The orbits are unremarkable. No skull fracture. No acute intracranial findings. No significant interval change. This document has been electronically signed by: Roel Lee MD on 11/22/2020 11:54 PM All CT scans at this facility use dose modulation, iterative reconstruction, and/or weight-based dosing when appropriate to reduce radiation dose to as low as reasonably achievable.      Ct Head Wo Contrast    Result Date: 11/21/2020  CT Head WITHOUT IV contrast Comparison:  CT  - CT HEAD WO CONTR  - 12/23/2016 07:38 PM EST Findings: Mild cerebral atrophy Bilateral white matter low attenuation noted, chronic in appearance and most consistent with chronic ischemic demyelination on basis of small vessel disease    . No acute intracranial hemorrhage, hydrocephalus or shift. Arvilla Malika white junction, cisterns and sulci preserved. No mass. Mild bilateral ethmoid sinus mucosal thickening unchanged. Small air-fluid levels in sphenoid sinus unchanged. Orbits, mastoids: no acute disease. No acute fracture or suspicious bone lesion. Impression: No acute intracranial disease. This document has been electronically signed by: Apryl Velazquez MD on 11/21/2020 11:37 PM All CT scans at this facility use dose modulation, iterative reconstruction, and/or weight-based dosing when appropriate to reduce radiation dose to as low as reasonably achievable. Ct Chest Wo Contrast    Result Date: 11/21/2020  CT CHEST WITHOUT IV contrast with multiplanar reconstructions Comparison:  CT,KO  - CTA CHEST  - 12/23/2016 04:44 PM EST Findings: Cardiac conduction delays. Right subclavian line tip in SVC. Endotracheal tube tip 3 cm above the elizabeth. Soft tissue density in right upper mediastinum in contiguity with the right lateral margin of the trachea and right lateral margin of esophagus measuring up to 4.2 cm in the transaxial plane No aortic aneurysm. Aortic valvular prosthesis. Mild cardiomegaly. Small right pleural effusion. Right more than left pleural calcifications. No pneumothorax Calcified granuloma in right hilum Moderate patchy airspace and groundglass as well as reticular lung opacities, right more than left. No suspicious pulmonary nodule. No pleural effusion or pneumothorax. Herniation of fat from the anterior mediastinum into the anterior chest wall measuring up to 6.6 cm in maximum transverse dimension Left anterior fifth rib fracture. Sternotomy wires. Moderate spondylosis and disc disease. Sternotomy wires.   Moderate degenerative arthritic change Gynecomastia. Impression: 1. Mild cardiomegaly with small right pleural effusion unchanged 2. Right more than left pleural calcifications unchanged 3. Moderate patchy airspace and groundglass as well as reticular lung opacities, right more than left. No change. These findings are nonspecific; considerations would include sequela of tuberculosis 4. Left anterior fifth rib fracture, new 5. Previous sternotomy. Herniation of fat from the anterior mediastinum into the anterior chest wall through the sternotomy defect measuring up to 6.6 cm in maximum transverse dimension. Increased since previous This document has been electronically signed by: Ondina Swain MD on 11/21/2020 09:28 PM All CT scans at this facility use dose modulation, iterative reconstruction, and/or weight-based dosing when appropriate to reduce radiation dose to as low as reasonably achievable. Xr Chest Portable    Result Date: 11/22/2020  PROCEDURE: XR CHEST PORTABLE CLINICAL INFORMATION: Respiratory failure. COMPARISON: Chest x-ray dated 21 November 2020. CT scan of the chest dated 21 November 2020. TECHNIQUE: AP upright view of the chest. FINDINGS: There is no change in the endotracheal tube, enteric tube or right subclavian catheter. The patient is status post aortic valve replacement and pacemaker placement There is mild cardiomegaly. .The mediastinum is slightly widened. There is pleural thickening or effusion bilaterally right greater than left. There is abnormal density especially in the right upper and lower lobes. The pulmonary vascularity is normal. The left fifth rib fracture is not clearly seen on plain radiographs. No interval change since previous study dated 21 November 2020. **This report has been created using voice recognition software. It may contain minor errors which are inherent in voice recognition technology. ** Final report electronically signed by DR Anastacia Slaughter on 11/22/2020 9:46 AM    Xr Chest Portable    Result Date: 11/21/2020  PROCEDURE: XR CHEST PORTABLE CLINICAL INFORMATION: OG placement . COMPARISON: No prior study. TECHNIQUE: Portable supine FINDINGS: Exam is severely limited due to technique. NG tube is seen in the distal esophagus extends into the stomach in appropriate position     NG tube is in the stomach. **This report has been created using voice recognition software. It may contain minor errors which are inherent in voice recognition technology. ** Final report electronically signed by Dr. Nirav Clayton on 11/21/2020 7:11 PM    Xr Chest Portable    Result Date: 11/21/2020  PROCEDURE: XR CHEST PORTABLE CLINICAL INFORMATION: HD line . COMPARISON: 11/21/2020 10:30 AM TECHNIQUE: Portable semiupright FINDINGS: Dialysis catheter has been placed from the right subclavian approach. The catheter tip is at the expected superior vena cava just distal to the superior vena cava subclavian vein junction. There is additional widening of the pleural space at the same level suggesting possible associated hematoma. There are no other changes from earlier is no evidence of pneumothorax. Central venous catheter tip is in the proximal superior vena cava. There is a further widening of the pleural space at this level which could partly be projectional but also may represent associated pleural hemorrhage. This was called to the  patient's nurse practitioner Dionne Mchugh 2:12 PM **This report has been created using voice recognition software. It may contain minor errors which are inherent in voice recognition technology. ** Final report electronically signed by Dr. Nirav Clayton on 11/21/2020 2:12 PM    Xr Chest Portable    Result Date: 11/21/2020  PROCEDURE: XR CHEST PORTABLE CLINICAL INFORMATION: post arrest. COMPARISON: Chest x-ray dated 4 February 2020. TECHNIQUE: AP upright view of the chest. FINDINGS: There is an endotracheal tube with the tip 4.5 cm above the elizabeth.  There is cardiomegaly in this patient status post aortic valve replacement and pacemaker placement. The mediastinum is not widened. There is increased pulmonary vascularity suggestive of congestive heart failure. There are bilateral pleural effusions left greater than right. The pulmonary vascularity is increased. No suspicious osseous lesions are present. 1. Endotracheal tube with tip 4.5 cm above the elizabeth. 2. Cardiomegaly status post previous aortic valve replacement and pacemaker placement. 3. Bilateral pleural effusions or thickening and increased pulmonary vascularity suggestive of chronic congestive heart failure. . **This report has been created using voice recognition software. It may contain minor errors which are inherent in voice recognition technology. ** Final report electronically signed by DR Elise Salgado on 11/21/2020 11:07 AM    Xr Abdomen For Ng/og/ne Tube Placement    Result Date: 11/21/2020  PROCEDURE: XR ABDOMEN FOR NG/OG/NE TUBE PLACEMENT CLINICAL INFORMATION: Confirmation of course of NG/OG/NE tube and location of tip of tube . COMPARISON: No prior study. TECHNIQUE: A portable AP supine view of the abdomen was obtained. FINDINGS:  The tip of the enteric tube is at the nasogastric junction     1. The tip of the enteric tube is at the nasogastric junction. **This report has been created using voice recognition software. It may contain minor errors which are inherent in voice recognition technology. ** Final report electronically signed by DR Elise Salgado on 11/21/2020 11:08 AM       [address]:-  Anna Lester is an 80 y.o. male who presented to [facility] on 11/21/2020 in cardiac arrest. Mara Hussein has a PMHx of prostate enlargement, pacemaker, hypoxic respiratory failure, obstructive sleep apnea, morbid obesity, hypertension, hyperlipidemia, end-stage renal disease, DVT, diabetes mellitus type 2, CAD, COPD, atrial fibrillation, aortic stenosis, heart failure.  Per report patient was on his way to brain injury, secondary to cardiac arrest    Time of Death:  2020 at 1246    Disposition:      Autopsy Requested: No    Time Spent:  30 minutes    Electronically signed by  Loki Sue DO on 2020 at 1:00 PM      Patient seen by me. Case discussed with resident physician.     Electronically signed by Mirlande Hogan MD on 2020 at 5:34 PM

## 2020-11-27 LAB
BLOOD CULTURE, ROUTINE: NORMAL
BLOOD CULTURE, ROUTINE: NORMAL

## 2020-12-11 RX ORDER — INSULIN GLARGINE 100 [IU]/ML
50 INJECTION, SOLUTION SUBCUTANEOUS NIGHTLY
Qty: 5 PEN | Refills: 1 | OUTPATIENT
Start: 2020-12-11

## 2021-01-18 NOTE — ED PROVIDER NOTES
[facility]  eMERGENCY dEPARTMENT eNCOUnter          CHIEF COMPLAINT       Chief Complaint   Patient presents with    Shortness of Breath       Nurses Notes reviewed and I agree except as noted in the HPI. HISTORY OF PRESENT ILLNESS    Kelly Contreras is a 80 y.o. male who presents to the Emergency Department for the evaluation of shortness of breath onset 1.5 weeks ago. Patient states his SOB is worse on exertion, but denies feeling short of breath laying still. He reports weight gain. He denies chest pain, abdominal pain, diarrhea, vomiting, or fever. Patient is on 2L of O2 at home. Patient has dialysis Tuesday's, Thursday's, and Saturday's and states he follows Dr. Patrica Bacon (nephrology). The patient follows Dr. Abi Cervantes (Caardiology). The patient has a past medical history of atrial flutter, A-fib, COPD, CAD, DM, CHF, DM, DVT, HLD, HTN, KELSEY, OA, and a pacemaker. No further complaints at the time of the initial encounter. The HPI was provided by the patient. REVIEW OF SYSTEMS     Review of Systems   Constitutional: Positive for unexpected weight change (gain). Negative for chills, diaphoresis and fever. Respiratory: Positive for shortness of breath. Negative for cough. Cardiovascular: Negative for chest pain. Gastrointestinal: Negative for abdominal pain, diarrhea, nausea and vomiting. Musculoskeletal: Negative for arthralgias.        PAST MEDICAL HISTORY    has a past medical history of Acute on chronic combined systolic and diastolic congestive heart failure ([address] Aortic stenosis, Atrial fibrillation (HCC), Atrial flutter ([address] COPD (chronic obstructive pulmonary disease) ([address] Coronary artery disease, DM (diabetes mellitus), type 2 with renal complications ([address] DVT (deep venous thrombosis) ([address] Hemodialysis patient ([address] Hyperlipidemia, Hypertension, Morbid obesity with BMI of 50.0-59.9, adult ([address] Obstructive sleep apnea, On home oxygen therapy, KELSEY treated with BiPAP, Osteoarthritis, Pacemaker, and Prostate enlargement. SURGICAL HISTORY    has a past surgical history that includes Aortic valve replacement (2/3/2010); Dialysis fistula creation (07/27/11); Tonsillectomy (6yrs old); transthoracic echocardiogram (12 09 2009); cardiovascular stress test (7 15 2009); transthoracic echocardiogram (11 09 2007); cardiovascular stress test (11 09 2007); Cardiac catheterization (1 20 2010); Cardiac catheterization (8 12 2009); Cardiac catheterization (11 16 2007); Tonsillectomy and adenoidectomy; Colonoscopy; Upper gastrointestinal endoscopy; eye surgery; pacemaker placement; other surgical history (09/13/2016); pr colonoscopy flx dx w/collj spec when pfrmd (N/A, 9/7/2018); and pr egd transoral biopsy single/multiple (N/A, 9/7/2018). CURRENT MEDICATIONS       Current Discharge Medication List      CONTINUE these medications which have NOT CHANGED    Details   insulin glargine (LANTUS SOLOSTAR) 100 UNIT/ML injection pen Inject 40 -50 units at bedtime  Qty: 45 pen, Refills: 1      insulin aspart (NOVOLOG FLEXPEN) 100 UNIT/ML injection pen 2-16 units 3 times a day as per scale  Qty: 45 pen, Refills: 3      Spacer/Aero-Holding Chambers ZOHREH 1 Device by Does not apply route 2 times daily With Symbicort inhaler  Qty: 1 Device, Refills: 0      SALINE NASAL SPRAY NA by Nasal route as needed Indications: Dry Nose      budesonide-formoterol (SYMBICORT) 80-4.5 MCG/ACT AERO Inhale 2 puffs into the lungs 2 times daily Rinse mouth after its use.   Qty: 1 Inhaler, Refills: 11      isosorbide mononitrate (IMDUR) 30 MG extended release tablet Take 1 tablet by mouth daily Indications: Treatment to Prevent Angina  Qty: 90 tablet, Refills: 1      fludrocortisone (FLORINEF) 0.1 MG tablet Take 1 tablet by mouth daily Indications: Blood Pressure Drop Upon Standing  Qty: 90 tablet, Refills: 1      tamsulosin (FLOMAX) 0.4 MG capsule Take 1 capsule by mouth daily  Qty: 90 capsule, Refills: 1      atorvastatin [facility]. !! - Potential duplicate medications found. Please discuss with provider. ALLERGIES     has No Known Allergies. FAMILY HISTORY     He indicated that his mother is . He indicated that his father is . He indicated that the status of his sister is unknown. He indicated that the status of his brother is unknown.   family history includes Diabetes in his brother, father, and sister. SOCIAL HISTORY      reports that he quit smoking about 26 years ago. His smoking use included pipe and cigars. He quit after 20.00 years of use. He has quit using smokeless tobacco.  His smokeless tobacco use included chew. He reports that he does not drink alcohol or use drugs. PHYSICAL EXAM     INITIAL VITALS:  height is 5' 10\" (1.778 m) and weight is 363 lb 5.1 oz (164.8 kg) (abnormal). His temperature is 97.6 °F (36.4 °C). His blood pressure is 107/43 (abnormal) and his pulse is 69. His respiration is 18 and oxygen saturation is 99%. Physical Exam  Vitals signs and nursing note reviewed. Constitutional:       Appearance: He is well-developed. He is not toxic-appearing or diaphoretic. HENT:      Head: Normocephalic and atraumatic. Eyes:      Extraocular Movements: Extraocular movements intact. Conjunctiva/sclera: Conjunctivae normal.   Neck:      Musculoskeletal: Normal range of motion and neck supple. Vascular: No JVD. Cardiovascular:      Rate and Rhythm: Normal rate and regular rhythm. Pulses: Normal pulses. Heart sounds: Normal heart sounds. No murmur. No friction rub. No gallop. Pulmonary:      Effort: Pulmonary effort is normal. No respiratory distress. Breath sounds: Examination of the right-lower field reveals rales. Examination of the left-lower field reveals rales. Rales present. No decreased breath sounds, wheezing or rhonchi. Comments: Lungs are clear up top. No work of breathing.    Abdominal:      General: Abdomen Post-Care Instructions: I reviewed with the patient in detail post-care instructions. Patient is to wear sunprotection, and avoid picking at any of the treated lesions. Pt may apply Vaseline to crusted or scabbing areas. is protuberant. Bowel sounds are normal. There is no distension. Palpations: Abdomen is soft. Tenderness: There is no abdominal tenderness. There is no guarding or rebound. Musculoskeletal: Normal range of motion. Right lower le+ Pitting Edema present. Left lower le+ Pitting Edema present. Skin:     General: Skin is warm and dry. Findings: No rash. Neurological:      Mental Status: He is alert and oriented to person, place, and time. Motor: No abnormal muscle tone. Coordination: Coordination normal.         DIFFERENTIAL DIAGNOSIS:   CHF exacerbation, COPD exacerbation, dependent edema, ACS    DIAGNOSTIC RESULTS     EKG: All EKG's are interpreted by the Emergency Department Physician who either signs or Co-signs this chart in the absence of a cardiologist.  EKG interpreted by Josefa Briggs, DO:    Vent. Rate: 70 bpm  HI interval: * ms  QRS duration: 210 ms  QTc: 550 ms  P-R-T axes: *, -70, 112  Ventricular-paced rhythm  No STEMI. Compared to old EKG on 12-DEC-2018    RADIOLOGY: non-plain film images(s) such as CT, Ultrasound and MRI are read by the radiologist.    XR CHEST PORTABLE   Final Result   1. Moderate coronary megaly. Metallic sternotomy sutures. Permanent dual-chamber pacemaker. 2. Moderately severe pleural thickening around the periphery of right lung with some associated fibrocalcific plaque formation, not appreciably changed from prior study. Increased parenchymal markings present both mid and lower lung fields, worse on the    right, which appear chronic. Kiran Bahena **This report has been created using voice recognition software. It may contain minor errors which are inherent in voice recognition technology. **      Final report electronically signed by Dr. Ruben Parker on 2020 11:40 AM           LABS:   Labs Reviewed   CBC WITH AUTO DIFFERENTIAL - Abnormal; Notable for the following components:       Result Value    RBC 2.84 (*)     Hemoglobin 7.7 (*)     Hematocrit 26.4 (*)     MCHC 29.2 (*)     RDW-CV 19.9 (*)     RDW-SD 64.2 (*)     MPV 9.3 (*)     Lymphocytes Absolute 0.8 (*)     Immature Grans (Abs) 0.23 (*)     All other components within normal limits   BASIC METABOLIC PANEL - Abnormal; Notable for the following components:    Chloride 96 (*)     CO2 21 (*)     Glucose 249 (*)     BUN 74 (*)     CREATININE 11.8 (*)     All other components within normal limits   TROPONIN - Abnormal; Notable for the following components:    Troponin T 0.134 (*)     All other components within normal limits   BRAIN NATRIURETIC PEPTIDE - Abnormal; Notable for the following components:    Pro-BNP 9677.0 (*)     All other components within normal limits   ANION GAP - Abnormal; Notable for the following components:    Anion Gap 23.0 (*)     All other components within normal limits   GLOMERULAR FILTRATION RATE, ESTIMATED - Abnormal; Notable for the following components:    Est, Glom Filt Rate 5 (*)     All other components within normal limits   OSMOLALITY - Abnormal; Notable for the following components:    Osmolality Calc 309.7 (*)     All other components within normal limits   MAGNESIUM   TYPE AND SCREEN       EMERGENCY DEPARTMENT COURSE:   Vitals:    Vitals:    02/04/20 1351 02/04/20 1508 02/04/20 1625 02/04/20 1707   BP: (!) 108/55 (!) 95/57 (!) 154/72 (!) 107/43   Pulse: 70 70 70 69   Resp: 18 20 18 18   Temp:    97.6 °F (36.4 °C)   TempSrc:       SpO2: 98% 95% 99%    Weight:    (!) 363 lb 5.1 oz (164.8 kg)   Height:           11:09 AM: The patient was seen and evaluated. MDM:  Patient presented with complaint of shortness of breath, history of kidney failure on dialysis, is due for dialysis today. Patient borderline hypotensive, improved with IV bolus 250 cc normal saline. Patient cannot get dialysis today due to hypotension, and dialysis center schedule is also full.   Patient will be admitted by the hospitalist for treatment, consider inpatient Consent: The patient's consent was obtained including but not limited to risks of crusting, scabbing, blistering, scarring, darker or lighter pigmentary change, recurrence, incomplete removal and infection. Detail Level: Simple Render Note In Bullet Format When Appropriate: No Duration Of Freeze Thaw-Cycle (Seconds): 10 Number Of Freeze-Thaw Cycles: 1 freeze-thaw cycle
